# Patient Record
Sex: MALE | Race: WHITE | Employment: OTHER | URBAN - METROPOLITAN AREA
[De-identification: names, ages, dates, MRNs, and addresses within clinical notes are randomized per-mention and may not be internally consistent; named-entity substitution may affect disease eponyms.]

---

## 2017-01-11 RX ORDER — TESTOSTERONE 16.2 MG/G
GEL TRANSDERMAL
Qty: 75 G | Refills: 3
Start: 2017-01-11 | End: 2017-02-22 | Stop reason: CLARIF

## 2017-01-11 RX ORDER — ALLOPURINOL 300 MG/1
300 TABLET ORAL DAILY
Qty: 90 TABLET | Refills: 3 | Status: SHIPPED | OUTPATIENT
Start: 2017-01-11 | End: 2018-01-29 | Stop reason: SDUPTHER

## 2017-01-11 RX ORDER — AMLODIPINE BESYLATE 10 MG/1
10 TABLET ORAL DAILY
Qty: 90 TABLET | Refills: 3 | Status: SHIPPED | OUTPATIENT
Start: 2017-01-11 | End: 2018-01-29 | Stop reason: SDUPTHER

## 2017-01-30 ENCOUNTER — TELEPHONE (OUTPATIENT)
Dept: INTERNAL MEDICINE CLINIC | Age: 67
End: 2017-01-30

## 2017-02-09 ENCOUNTER — HOSPITAL ENCOUNTER (OUTPATIENT)
Dept: OTHER | Age: 67
Discharge: HOME | End: 2017-02-09
Attending: INTERNAL MEDICINE | Admitting: INTERNAL MEDICINE

## 2017-02-09 LAB
A/G RATIO: 1.4 (ref 1.1–2.2)
ALBUMIN SERPL-MCNC: 4.2 G/DL (ref 3.4–5)
ALP BLD-CCNC: 98 U/L (ref 40–129)
ALT SERPL-CCNC: 37 U/L (ref 10–40)
ANION GAP SERPL CALCULATED.3IONS-SCNC: 13 MMOL/L (ref 3–16)
AST SERPL-CCNC: 30 U/L (ref 15–37)
BILIRUB SERPL-MCNC: 0.4 MG/DL (ref 0–1)
BUN BLDV-MCNC: 17 MG/DL (ref 7–20)
CALCIUM SERPL-MCNC: 9.7 MG/DL (ref 8.3–10.6)
CHLORIDE BLD-SCNC: 100 MMOL/L (ref 99–110)
CHOLESTEROL, TOTAL: 155 MG/DL (ref 0–199)
CO2: 26 MMOL/L (ref 21–32)
CREAT SERPL-MCNC: 1 MG/DL (ref 0.8–1.3)
CREATININE URINE: 151.1 MG/DL (ref 39–259)
GFR AFRICAN AMERICAN: >60
GFR NON-AFRICAN AMERICAN: >60
GLOBULIN: 3 G/DL
GLUCOSE BLD-MCNC: 180 MG/DL (ref 70–99)
HDLC SERPL-MCNC: 28 MG/DL (ref 40–60)
LDL CHOLESTEROL CALCULATED: 73 MG/DL
MICROALBUMIN UR-MCNC: 1.5 MG/DL
MICROALBUMIN/CREAT UR-RTO: 9.9 MG/G (ref 0–30)
POTASSIUM SERPL-SCNC: 4.7 MMOL/L (ref 3.5–5.1)
SODIUM BLD-SCNC: 139 MMOL/L (ref 136–145)
TOTAL PROTEIN: 7.2 G/DL (ref 6.4–8.2)
TRIGL SERPL-MCNC: 269 MG/DL (ref 0–150)
TSH SERPL DL<=0.05 MIU/L-ACNC: 1.79 UIU/ML (ref 0.27–4.2)
VLDLC SERPL CALC-MCNC: 54 MG/DL

## 2017-02-10 LAB
ESTIMATED AVERAGE GLUCOSE: 234.6 MG/DL
HBA1C MFR BLD: 9.8 %

## 2017-02-20 ENCOUNTER — HOSPITAL ENCOUNTER (OUTPATIENT)
Dept: VASCULAR LAB | Age: 67
Discharge: HOME | End: 2017-02-20
Attending: INTERNAL MEDICINE | Admitting: INTERNAL MEDICINE

## 2017-02-20 DIAGNOSIS — E11.65 TYPE 2 DIABETES MELLITUS WITH HYPERGLYCEMIA (HCC): ICD-10-CM

## 2017-02-22 ENCOUNTER — OFFICE VISIT (OUTPATIENT)
Dept: INTERNAL MEDICINE CLINIC | Age: 67
End: 2017-02-22

## 2017-02-22 VITALS
WEIGHT: 280 LBS | BODY MASS INDEX: 39.2 KG/M2 | SYSTOLIC BLOOD PRESSURE: 128 MMHG | HEIGHT: 71 IN | HEART RATE: 72 BPM | DIASTOLIC BLOOD PRESSURE: 68 MMHG

## 2017-02-22 DIAGNOSIS — Z11.59 NEED FOR HEPATITIS C SCREENING TEST: ICD-10-CM

## 2017-02-22 DIAGNOSIS — I10 ESSENTIAL HYPERTENSION: ICD-10-CM

## 2017-02-22 DIAGNOSIS — E29.1 PRIMARY MALE HYPOGONADISM: ICD-10-CM

## 2017-02-22 DIAGNOSIS — E11.42 TYPE 2 DIABETES MELLITUS WITH DIABETIC POLYNEUROPATHY, WITH LONG-TERM CURRENT USE OF INSULIN (HCC): Primary | ICD-10-CM

## 2017-02-22 DIAGNOSIS — N42.9 PROSTATE DISEASE: ICD-10-CM

## 2017-02-22 DIAGNOSIS — Z79.4 TYPE 2 DIABETES MELLITUS WITH DIABETIC POLYNEUROPATHY, WITH LONG-TERM CURRENT USE OF INSULIN (HCC): Primary | ICD-10-CM

## 2017-02-22 DIAGNOSIS — G47.33 OSA (OBSTRUCTIVE SLEEP APNEA): ICD-10-CM

## 2017-02-22 DIAGNOSIS — Z12.5 SCREENING PSA (PROSTATE SPECIFIC ANTIGEN): ICD-10-CM

## 2017-02-22 DIAGNOSIS — E03.9 ACQUIRED HYPOTHYROIDISM: ICD-10-CM

## 2017-02-22 PROCEDURE — 99214 OFFICE O/P EST MOD 30 MIN: CPT | Performed by: INTERNAL MEDICINE

## 2017-02-22 RX ORDER — TESTOSTERONE GEL, 1% 10 MG/G
GEL TRANSDERMAL
Qty: 150 G | Refills: 5 | Status: SHIPPED | OUTPATIENT
Start: 2017-02-22 | End: 2017-08-23 | Stop reason: ALTCHOICE

## 2017-02-22 ASSESSMENT — ENCOUNTER SYMPTOMS
SHORTNESS OF BREATH: 0
COUGH: 0
COLOR CHANGE: 0
WHEEZING: 0
VOMITING: 0
ABDOMINAL PAIN: 0
NAUSEA: 0

## 2017-02-22 ASSESSMENT — PATIENT HEALTH QUESTIONNAIRE - PHQ9
1. LITTLE INTEREST OR PLEASURE IN DOING THINGS: 0
SUM OF ALL RESPONSES TO PHQ9 QUESTIONS 1 & 2: 0
2. FEELING DOWN, DEPRESSED OR HOPELESS: 0
SUM OF ALL RESPONSES TO PHQ QUESTIONS 1-9: 0

## 2017-02-23 ENCOUNTER — HOSPITAL ENCOUNTER (OUTPATIENT)
Dept: OTHER | Age: 67
Discharge: HOME | End: 2017-02-23
Attending: INTERNAL MEDICINE | Admitting: INTERNAL MEDICINE

## 2017-02-23 ENCOUNTER — HOSPITAL ENCOUNTER (OUTPATIENT)
Dept: VASCULAR LAB | Age: 67
Discharge: HOME | End: 2017-02-23
Admitting: INTERNAL MEDICINE

## 2017-02-23 DIAGNOSIS — Z11.59 NEED FOR HEPATITIS C SCREENING TEST: ICD-10-CM

## 2017-02-23 DIAGNOSIS — E29.1 PRIMARY MALE HYPOGONADISM: ICD-10-CM

## 2017-02-23 DIAGNOSIS — E11.65 TYPE 2 DIABETES MELLITUS WITH HYPERGLYCEMIA (HCC): ICD-10-CM

## 2017-02-23 DIAGNOSIS — N42.9 PROSTATE DISEASE: ICD-10-CM

## 2017-02-23 DIAGNOSIS — I73.9 PVD (PERIPHERAL VASCULAR DISEASE) (HCC): Primary | ICD-10-CM

## 2017-02-23 LAB
HCT VFR BLD CALC: 40.3 % (ref 40.5–52.5)
HEMOGLOBIN: 13.3 G/DL (ref 13.5–17.5)
HEPATITIS C ANTIBODY INTERPRETATION: NORMAL
MCH RBC QN AUTO: 28.9 PG (ref 26–34)
MCHC RBC AUTO-ENTMCNC: 32.9 G/DL (ref 31–36)
MCV RBC AUTO: 87.8 FL (ref 80–100)
PDW BLD-RTO: 14.4 % (ref 12.4–15.4)
PLATELET # BLD: 268 K/UL (ref 135–450)
PMV BLD AUTO: 10.1 FL (ref 5–10.5)
PROSTATE SPECIFIC ANTIGEN: 1.71 NG/ML (ref 0–4)
RBC # BLD: 4.59 M/UL (ref 4.2–5.9)
WBC # BLD: 9 K/UL (ref 4–11)

## 2017-02-25 LAB
SEX HORMONE BINDING GLOBULIN: 23 NMOL/L (ref 11–80)
TESTOSTERONE FREE PERCENT: 2.1 % (ref 1.6–2.9)
TESTOSTERONE FREE, CALC: 36 PG/ML (ref 47–244)
TESTOSTERONE TOTAL-MALE: 176 NG/DL (ref 300–720)

## 2017-03-21 RX ORDER — POTASSIUM CITRATE 10 MEQ/1
TABLET, EXTENDED RELEASE ORAL
Qty: 30 TABLET | Refills: 0 | Status: SHIPPED | OUTPATIENT
Start: 2017-03-21 | End: 2017-04-24 | Stop reason: SDUPTHER

## 2017-04-24 RX ORDER — POTASSIUM CITRATE 10 MEQ/1
TABLET, EXTENDED RELEASE ORAL
Qty: 30 TABLET | Refills: 0 | Status: SHIPPED | OUTPATIENT
Start: 2017-04-24 | End: 2017-05-22 | Stop reason: SDUPTHER

## 2017-05-22 RX ORDER — POTASSIUM CITRATE 10 MEQ/1
TABLET, EXTENDED RELEASE ORAL
Qty: 30 TABLET | Refills: 0 | Status: SHIPPED | OUTPATIENT
Start: 2017-05-22 | End: 2017-07-21 | Stop reason: SDUPTHER

## 2017-05-23 ENCOUNTER — TELEPHONE (OUTPATIENT)
Dept: SLEEP MEDICINE | Age: 67
End: 2017-05-23

## 2017-05-23 ENCOUNTER — INITIAL CONSULT (OUTPATIENT)
Dept: SLEEP MEDICINE | Age: 67
End: 2017-05-23

## 2017-05-23 VITALS
SYSTOLIC BLOOD PRESSURE: 120 MMHG | HEART RATE: 63 BPM | DIASTOLIC BLOOD PRESSURE: 62 MMHG | WEIGHT: 278 LBS | OXYGEN SATURATION: 97 % | HEIGHT: 72 IN | BODY MASS INDEX: 37.65 KG/M2

## 2017-05-23 DIAGNOSIS — I10 ESSENTIAL HYPERTENSION: Chronic | ICD-10-CM

## 2017-05-23 DIAGNOSIS — E11.42 TYPE 2 DIABETES MELLITUS WITH DIABETIC POLYNEUROPATHY, WITH LONG-TERM CURRENT USE OF INSULIN (HCC): Chronic | ICD-10-CM

## 2017-05-23 DIAGNOSIS — E66.9 NON MORBID OBESITY, UNSPECIFIED OBESITY TYPE: Chronic | ICD-10-CM

## 2017-05-23 DIAGNOSIS — G47.33 OBSTRUCTIVE SLEEP APNEA SYNDROME: Primary | ICD-10-CM

## 2017-05-23 DIAGNOSIS — Z79.4 TYPE 2 DIABETES MELLITUS WITH DIABETIC POLYNEUROPATHY, WITH LONG-TERM CURRENT USE OF INSULIN (HCC): Chronic | ICD-10-CM

## 2017-05-23 PROCEDURE — 99204 OFFICE O/P NEW MOD 45 MIN: CPT | Performed by: INTERNAL MEDICINE

## 2017-05-23 ASSESSMENT — ENCOUNTER SYMPTOMS
PHOTOPHOBIA: 0
RHINORRHEA: 0
EYE PAIN: 0
SHORTNESS OF BREATH: 0
VOMITING: 0
ALLERGIC/IMMUNOLOGIC NEGATIVE: 1
APNEA: 0
CHEST TIGHTNESS: 0
NAUSEA: 0
ABDOMINAL DISTENTION: 0
ABDOMINAL PAIN: 0
CHOKING: 0

## 2017-05-23 ASSESSMENT — SLEEP AND FATIGUE QUESTIONNAIRES
HOW LIKELY ARE YOU TO NOD OFF OR FALL ASLEEP WHILE WATCHING TV: 2
HOW LIKELY ARE YOU TO NOD OFF OR FALL ASLEEP IN A CAR, WHILE STOPPED FOR A FEW MINUTES IN TRAFFIC: 1
HOW LIKELY ARE YOU TO NOD OFF OR FALL ASLEEP WHILE LYING DOWN TO REST IN THE AFTERNOON WHEN CIRCUMSTANCES PERMIT: 2
HOW LIKELY ARE YOU TO NOD OFF OR FALL ASLEEP WHILE SITTING INACTIVE IN A PUBLIC PLACE: 1
HOW LIKELY ARE YOU TO NOD OFF OR FALL ASLEEP WHILE SITTING AND READING: 2
HOW LIKELY ARE YOU TO NOD OFF OR FALL ASLEEP WHILE SITTING AND TALKING TO SOMEONE: 1
NECK CIRCUMFERENCE (INCHES): 20
HOW LIKELY ARE YOU TO NOD OFF OR FALL ASLEEP WHEN YOU ARE A PASSENGER IN A CAR FOR AN HOUR WITHOUT A BREAK: 2
ESS TOTAL SCORE: 12
HOW LIKELY ARE YOU TO NOD OFF OR FALL ASLEEP WHILE SITTING QUIETLY AFTER LUNCH WITHOUT ALCOHOL: 1

## 2017-06-06 ENCOUNTER — HOSPITAL ENCOUNTER (OUTPATIENT)
Dept: SLEEP MEDICINE | Age: 67
Discharge: HOME | End: 2017-06-30
Admitting: INTERNAL MEDICINE

## 2017-06-06 DIAGNOSIS — G47.33 OBSTRUCTIVE SLEEP APNEA SYNDROME: ICD-10-CM

## 2017-06-07 PROCEDURE — 95806 SLEEP STUDY UNATT&RESP EFFT: CPT | Performed by: INTERNAL MEDICINE

## 2017-06-12 ENCOUNTER — TELEPHONE (OUTPATIENT)
Dept: PULMONOLOGY | Age: 67
End: 2017-06-12

## 2017-06-20 ENCOUNTER — TELEPHONE (OUTPATIENT)
Dept: SLEEP MEDICINE | Age: 67
End: 2017-06-20

## 2017-06-26 RX ORDER — ATORVASTATIN CALCIUM 10 MG/1
TABLET, FILM COATED ORAL
Qty: 90 TABLET | Refills: 1 | Status: SHIPPED | OUTPATIENT
Start: 2017-06-26 | End: 2018-01-29 | Stop reason: SDUPTHER

## 2017-07-21 RX ORDER — POTASSIUM CITRATE 10 MEQ/1
TABLET, EXTENDED RELEASE ORAL
Qty: 30 TABLET | Refills: 1 | Status: SHIPPED | OUTPATIENT
Start: 2017-07-21 | End: 2017-09-25 | Stop reason: SDUPTHER

## 2017-08-08 ENCOUNTER — OFFICE VISIT (OUTPATIENT)
Dept: SLEEP MEDICINE | Age: 67
End: 2017-08-08

## 2017-08-08 VITALS
WEIGHT: 278 LBS | HEART RATE: 79 BPM | BODY MASS INDEX: 37.65 KG/M2 | HEIGHT: 72 IN | OXYGEN SATURATION: 98 % | DIASTOLIC BLOOD PRESSURE: 62 MMHG | SYSTOLIC BLOOD PRESSURE: 132 MMHG

## 2017-08-08 DIAGNOSIS — Z79.4 TYPE 2 DIABETES MELLITUS WITH DIABETIC POLYNEUROPATHY, WITH LONG-TERM CURRENT USE OF INSULIN (HCC): Chronic | ICD-10-CM

## 2017-08-08 DIAGNOSIS — E66.9 NON MORBID OBESITY, UNSPECIFIED OBESITY TYPE: Chronic | ICD-10-CM

## 2017-08-08 DIAGNOSIS — I10 ESSENTIAL HYPERTENSION: Chronic | ICD-10-CM

## 2017-08-08 DIAGNOSIS — G47.33 OBSTRUCTIVE SLEEP APNEA SYNDROME: Primary | ICD-10-CM

## 2017-08-08 DIAGNOSIS — E11.42 TYPE 2 DIABETES MELLITUS WITH DIABETIC POLYNEUROPATHY, WITH LONG-TERM CURRENT USE OF INSULIN (HCC): Chronic | ICD-10-CM

## 2017-08-08 PROCEDURE — 99214 OFFICE O/P EST MOD 30 MIN: CPT | Performed by: NURSE PRACTITIONER

## 2017-08-08 ASSESSMENT — ENCOUNTER SYMPTOMS
COUGH: 0
ABDOMINAL DISTENTION: 0
RHINORRHEA: 0
SHORTNESS OF BREATH: 0
SINUS PRESSURE: 0
APNEA: 0
ABDOMINAL PAIN: 0

## 2017-08-08 ASSESSMENT — SLEEP AND FATIGUE QUESTIONNAIRES
HOW LIKELY ARE YOU TO NOD OFF OR FALL ASLEEP WHILE SITTING AND TALKING TO SOMEONE: 0
HOW LIKELY ARE YOU TO NOD OFF OR FALL ASLEEP WHEN YOU ARE A PASSENGER IN A CAR FOR AN HOUR WITHOUT A BREAK: 1
ESS TOTAL SCORE: 8
HOW LIKELY ARE YOU TO NOD OFF OR FALL ASLEEP WHILE SITTING QUIETLY AFTER LUNCH WITHOUT ALCOHOL: 1
HOW LIKELY ARE YOU TO NOD OFF OR FALL ASLEEP WHILE WATCHING TV: 2
HOW LIKELY ARE YOU TO NOD OFF OR FALL ASLEEP IN A CAR, WHILE STOPPED FOR A FEW MINUTES IN TRAFFIC: 0
HOW LIKELY ARE YOU TO NOD OFF OR FALL ASLEEP WHILE SITTING INACTIVE IN A PUBLIC PLACE: 1
HOW LIKELY ARE YOU TO NOD OFF OR FALL ASLEEP WHILE LYING DOWN TO REST IN THE AFTERNOON WHEN CIRCUMSTANCES PERMIT: 2
HOW LIKELY ARE YOU TO NOD OFF OR FALL ASLEEP WHILE SITTING AND READING: 1

## 2017-08-23 ENCOUNTER — OFFICE VISIT (OUTPATIENT)
Dept: INTERNAL MEDICINE CLINIC | Age: 67
End: 2017-08-23

## 2017-08-23 VITALS
SYSTOLIC BLOOD PRESSURE: 134 MMHG | BODY MASS INDEX: 36.7 KG/M2 | WEIGHT: 271 LBS | HEIGHT: 72 IN | DIASTOLIC BLOOD PRESSURE: 70 MMHG

## 2017-08-23 DIAGNOSIS — G47.33 OBSTRUCTIVE SLEEP APNEA SYNDROME: Chronic | ICD-10-CM

## 2017-08-23 DIAGNOSIS — E03.9 ACQUIRED HYPOTHYROIDISM: Chronic | ICD-10-CM

## 2017-08-23 DIAGNOSIS — I10 ESSENTIAL HYPERTENSION: Chronic | ICD-10-CM

## 2017-08-23 DIAGNOSIS — Z79.4 TYPE 2 DIABETES MELLITUS WITH DIABETIC POLYNEUROPATHY, WITH LONG-TERM CURRENT USE OF INSULIN (HCC): Chronic | ICD-10-CM

## 2017-08-23 DIAGNOSIS — E11.42 TYPE 2 DIABETES MELLITUS WITH DIABETIC POLYNEUROPATHY, WITH LONG-TERM CURRENT USE OF INSULIN (HCC): Chronic | ICD-10-CM

## 2017-08-23 DIAGNOSIS — Z79.4 TYPE 2 DIABETES MELLITUS WITH DIABETIC POLYNEUROPATHY, WITH LONG-TERM CURRENT USE OF INSULIN (HCC): Primary | Chronic | ICD-10-CM

## 2017-08-23 DIAGNOSIS — E11.42 TYPE 2 DIABETES MELLITUS WITH DIABETIC POLYNEUROPATHY, WITH LONG-TERM CURRENT USE OF INSULIN (HCC): Primary | Chronic | ICD-10-CM

## 2017-08-23 PROCEDURE — 99214 OFFICE O/P EST MOD 30 MIN: CPT | Performed by: INTERNAL MEDICINE

## 2017-08-23 RX ORDER — ERGOCALCIFEROL 1.25 MG/1
CAPSULE ORAL
Qty: 6 CAPSULE | Refills: 3 | Status: SHIPPED | OUTPATIENT
Start: 2017-08-23 | End: 2018-07-09 | Stop reason: SDUPTHER

## 2017-08-23 ASSESSMENT — ENCOUNTER SYMPTOMS
SHORTNESS OF BREATH: 0
COUGH: 0
WHEEZING: 0
NAUSEA: 0
ABDOMINAL PAIN: 0

## 2017-08-24 LAB
ESTIMATED AVERAGE GLUCOSE: 228.8 MG/DL
HBA1C MFR BLD: 9.6 %

## 2017-09-25 RX ORDER — POTASSIUM CITRATE 10 MEQ/1
TABLET, EXTENDED RELEASE ORAL
Qty: 30 TABLET | Refills: 1 | Status: SHIPPED | OUTPATIENT
Start: 2017-09-25 | End: 2017-11-11 | Stop reason: SDUPTHER

## 2017-09-26 RX ORDER — LEVOTHYROXINE SODIUM 0.1 MG/1
TABLET ORAL
Qty: 90 TABLET | Refills: 5 | Status: SHIPPED | OUTPATIENT
Start: 2017-09-26 | End: 2018-09-24 | Stop reason: SDUPTHER

## 2017-10-19 DIAGNOSIS — Z23 NEED FOR INFLUENZA VACCINATION: Primary | ICD-10-CM

## 2017-10-19 PROCEDURE — 90662 IIV NO PRSV INCREASED AG IM: CPT | Performed by: INTERNAL MEDICINE

## 2017-10-19 PROCEDURE — G0008 ADMIN INFLUENZA VIRUS VAC: HCPCS | Performed by: INTERNAL MEDICINE

## 2017-11-11 DIAGNOSIS — E11.42 TYPE 2 DIABETES MELLITUS WITH DIABETIC POLYNEUROPATHY, WITH LONG-TERM CURRENT USE OF INSULIN (HCC): Chronic | ICD-10-CM

## 2017-11-11 DIAGNOSIS — Z79.4 TYPE 2 DIABETES MELLITUS WITH DIABETIC POLYNEUROPATHY, WITH LONG-TERM CURRENT USE OF INSULIN (HCC): Chronic | ICD-10-CM

## 2017-11-13 RX ORDER — POTASSIUM CITRATE 10 MEQ/1
TABLET, EXTENDED RELEASE ORAL
Qty: 30 TABLET | Refills: 2 | Status: SHIPPED | OUTPATIENT
Start: 2017-11-13 | End: 2018-01-29 | Stop reason: SDUPTHER

## 2017-11-13 RX ORDER — DULAGLUTIDE 0.75 MG/.5ML
INJECTION, SOLUTION SUBCUTANEOUS
Qty: 2 ML | Refills: 2 | Status: SHIPPED | OUTPATIENT
Start: 2017-11-13 | End: 2017-11-27 | Stop reason: DRUGHIGH

## 2017-11-14 RX ORDER — PEN NEEDLE, DIABETIC 31 GX5/16"
NEEDLE, DISPOSABLE MISCELLANEOUS
Qty: 200 EACH | Refills: 5 | Status: SHIPPED | OUTPATIENT
Start: 2017-11-14 | End: 2018-10-22 | Stop reason: SDUPTHER

## 2017-11-27 ENCOUNTER — OFFICE VISIT (OUTPATIENT)
Dept: INTERNAL MEDICINE CLINIC | Age: 67
End: 2017-11-27

## 2017-11-27 VITALS
BODY MASS INDEX: 36.7 KG/M2 | SYSTOLIC BLOOD PRESSURE: 128 MMHG | HEIGHT: 72 IN | HEART RATE: 72 BPM | WEIGHT: 271 LBS | DIASTOLIC BLOOD PRESSURE: 78 MMHG

## 2017-11-27 DIAGNOSIS — E29.1 PRIMARY MALE HYPOGONADISM: Chronic | ICD-10-CM

## 2017-11-27 DIAGNOSIS — E03.9 ACQUIRED HYPOTHYROIDISM: Chronic | ICD-10-CM

## 2017-11-27 DIAGNOSIS — E11.42 TYPE 2 DIABETES MELLITUS WITH DIABETIC POLYNEUROPATHY, WITH LONG-TERM CURRENT USE OF INSULIN (HCC): Primary | Chronic | ICD-10-CM

## 2017-11-27 DIAGNOSIS — Z79.4 TYPE 2 DIABETES MELLITUS WITH DIABETIC POLYNEUROPATHY, WITH LONG-TERM CURRENT USE OF INSULIN (HCC): Primary | Chronic | ICD-10-CM

## 2017-11-27 DIAGNOSIS — I10 ESSENTIAL HYPERTENSION: Chronic | ICD-10-CM

## 2017-11-27 PROCEDURE — 99214 OFFICE O/P EST MOD 30 MIN: CPT | Performed by: INTERNAL MEDICINE

## 2017-11-27 ASSESSMENT — ENCOUNTER SYMPTOMS
ABDOMINAL PAIN: 0
WHEEZING: 0
DIARRHEA: 0
VOMITING: 0
NAUSEA: 0
SHORTNESS OF BREATH: 0
CHEST TIGHTNESS: 0

## 2017-11-27 NOTE — PROGRESS NOTES
No    Drug use: No    Sexual activity: Not Asked     Other Topics Concern    None     Social History Narrative    None     Family History   Problem Relation Age of Onset    Cancer Father      skin    Coronary Art Dis Father     Diabetes Father     Coronary Art Dis Paternal Uncle     Diabetes Paternal Uncle     Stroke Paternal Uncle     Heart Disease Neg Hx     High Blood Pressure Neg Hx     Osteoporosis Neg Hx     Thyroid Disease Neg Hx        Review of Systems   Constitutional: Negative for diaphoresis and fatigue. Respiratory: Negative for chest tightness, shortness of breath and wheezing. Cardiovascular: Negative for chest pain and leg swelling. Gastrointestinal: Negative for abdominal pain, diarrhea, nausea and vomiting. Endocrine: Negative for polydipsia, polyphagia and polyuria. Neurological: Negative for dizziness and light-headedness. OBJECTIVE:  Pulse Readings from Last 4 Encounters:   11/27/17 72   08/08/17 79   05/23/17 63   02/22/17 72      Wt Readings from Last 4 Encounters:   11/27/17 271 lb (122.9 kg)   08/23/17 271 lb (122.9 kg)   08/08/17 278 lb (126.1 kg)   05/23/17 278 lb (126.1 kg)     BP Readings from Last 4 Encounters:   11/27/17 128/78   08/23/17 134/70   08/08/17 132/62   05/23/17 120/62     Physical Exam   Constitutional: He appears well-developed and well-nourished. Neck: Normal range of motion. Neck supple. Cardiovascular: Normal rate and normal heart sounds. Pulmonary/Chest: Effort normal and breath sounds normal. No respiratory distress. He has no wheezes. Musculoskeletal: He exhibits no edema. Skin: No rash noted. No erythema. Sensory exam of the foot showed decreased sensation tested with the monofilament. Good pulses, slight callus and thickened great toe nail. Sees podiatrist.  DRY SKIN   Nursing note and vitals reviewed.       CBC:   Lab Results   Component Value Date    WBC 9.0 02/23/2017    HGB 13.3 02/23/2017    HCT 40.3 02/23/2017

## 2018-02-01 ENCOUNTER — OFFICE VISIT (OUTPATIENT)
Dept: ENDOCRINOLOGY | Age: 68
End: 2018-02-01

## 2018-02-01 VITALS
BODY MASS INDEX: 36.14 KG/M2 | TEMPERATURE: 98.1 F | HEIGHT: 72 IN | WEIGHT: 266.8 LBS | RESPIRATION RATE: 12 BRPM | OXYGEN SATURATION: 96 % | SYSTOLIC BLOOD PRESSURE: 122 MMHG | DIASTOLIC BLOOD PRESSURE: 71 MMHG | HEART RATE: 68 BPM

## 2018-02-01 DIAGNOSIS — E55.9 VITAMIN D DEFICIENCY: ICD-10-CM

## 2018-02-01 DIAGNOSIS — E11.42 TYPE 2 DIABETES MELLITUS WITH DIABETIC POLYNEUROPATHY, WITH LONG-TERM CURRENT USE OF INSULIN (HCC): Chronic | ICD-10-CM

## 2018-02-01 DIAGNOSIS — I10 ESSENTIAL HYPERTENSION: Chronic | ICD-10-CM

## 2018-02-01 DIAGNOSIS — E78.2 MIXED HYPERLIPIDEMIA: ICD-10-CM

## 2018-02-01 DIAGNOSIS — E87.6 HYPOKALEMIA: ICD-10-CM

## 2018-02-01 DIAGNOSIS — Z79.4 TYPE 2 DIABETES MELLITUS WITH DIABETIC POLYNEUROPATHY, WITH LONG-TERM CURRENT USE OF INSULIN (HCC): Chronic | ICD-10-CM

## 2018-02-01 DIAGNOSIS — E03.9 ACQUIRED HYPOTHYROIDISM: ICD-10-CM

## 2018-02-01 PROCEDURE — 99214 OFFICE O/P EST MOD 30 MIN: CPT | Performed by: INTERNAL MEDICINE

## 2018-02-01 RX ORDER — VIT C/B6/B5/MAGNESIUM/HERB 173 50-5-6-5MG
1000 CAPSULE ORAL DAILY
COMMUNITY

## 2018-02-01 NOTE — PROGRESS NOTES
Mother Alive    Paternal Uncle     Neg Hx        Review of Systems  Constitutional: has fatigue, no fever, no recent weight gain, no recent weight loss, no changes in appetite  Eyes: no eye pain, no change in vision, no eye redness, no eye irritation, no double vision  Ears, nose, throat: no nasal congestion, no sore throat, no earache, no decrease in hearing, no hoarseness, no dry mouth, no sinus problems, no difficulty swallowing, no neck lumps, no dental problems, no mouth sores, no ringing in ears  Pulmonary: no shortness of breath, no wheezing, no dyspnea on exertion, no cough  Cardiovascular: no chest pain, no lower extremity edema, no orthopnea, no intermittent leg claudication, no palpitations  Gastrointestinal: no abdominal pain, no nausea, no vomiting, no diarrhea, no constipation, no dysphagia, no heartburn, no bloating  Genitourinary: no dysuria, no urinary incontinence, no urinary hesitancy, no urinary frequency, no feelings of urinary urgency, no nocturia  Musculoskeletal: no joint swelling, no joint stiffness, no joint pain, no muscle cramps, no muscle pain, no bone pain, no fractures  Integument/Breast:  no hair loss, no skin rashes, no skin lesions, no itching, no dry skin  Neurological: Has numbness, has tingling, no weakness, no confusion, no headaches, no dizziness, no fainting, no tremors, no decrease in memory, no balance problems  Psychiatric: no anxiety, no depression, no insomnia  Hematologic/Lymphatic: no tendency for easy bleeding, no swollen lymph nodes, no tendency for easy bruising  Immunology: has seasonal allergies, no frequent infections, no frequent illnesses  Endocrine: no temperature intolerance     OBJECTIVE:  /71 (Site: Right Arm, Position: Sitting, Cuff Size: Large Adult)   Pulse 68   Temp 98.1 °F (36.7 °C) (Oral)   Resp 12   Ht 5' 11.85\" (1.825 m)   Wt 266 lb 12.8 oz (121 kg)   SpO2 96%   BMI 36.34 kg/m²      Constitutional: no acute distress, well appearing and

## 2018-02-05 ENCOUNTER — HOSPITAL ENCOUNTER (OUTPATIENT)
Dept: OTHER | Age: 68
Discharge: HOME | End: 2018-02-05
Attending: INTERNAL MEDICINE | Admitting: INTERNAL MEDICINE

## 2018-02-05 DIAGNOSIS — E03.9 ACQUIRED HYPOTHYROIDISM: ICD-10-CM

## 2018-02-05 DIAGNOSIS — E78.2 MIXED HYPERLIPIDEMIA: ICD-10-CM

## 2018-02-05 LAB
A/G RATIO: 1.4 (ref 1.1–2.2)
ALBUMIN SERPL-MCNC: 4.4 G/DL (ref 3.4–5)
ALP BLD-CCNC: 94 U/L (ref 40–129)
ALT SERPL-CCNC: 45 U/L (ref 10–40)
ANION GAP SERPL CALCULATED.3IONS-SCNC: 13 MMOL/L (ref 3–16)
AST SERPL-CCNC: 34 U/L (ref 15–37)
BILIRUB SERPL-MCNC: 0.3 MG/DL (ref 0–1)
BUN BLDV-MCNC: 18 MG/DL (ref 7–20)
CALCIUM SERPL-MCNC: 9.8 MG/DL (ref 8.3–10.6)
CHLORIDE BLD-SCNC: 102 MMOL/L (ref 99–110)
CHOLESTEROL, TOTAL: 143 MG/DL (ref 0–199)
CO2: 27 MMOL/L (ref 21–32)
CREAT SERPL-MCNC: 1.1 MG/DL (ref 0.8–1.3)
CREATININE URINE: 195.5 MG/DL (ref 39–259)
ESTIMATED AVERAGE GLUCOSE: 223.1 MG/DL
GFR AFRICAN AMERICAN: >60
GFR NON-AFRICAN AMERICAN: >60
GLOBULIN: 3.1 G/DL
GLUCOSE BLD-MCNC: 151 MG/DL (ref 70–99)
HBA1C MFR BLD: 9.4 %
HDLC SERPL-MCNC: 27 MG/DL (ref 40–60)
LDL CHOLESTEROL CALCULATED: ABNORMAL MG/DL
LDL CHOLESTEROL DIRECT: 64 MG/DL
MICROALBUMIN UR-MCNC: 3.6 MG/DL
MICROALBUMIN/CREAT UR-RTO: 18.4 MG/G (ref 0–30)
POTASSIUM SERPL-SCNC: 4.6 MMOL/L (ref 3.5–5.1)
SODIUM BLD-SCNC: 142 MMOL/L (ref 136–145)
T4 FREE: 1.2 NG/DL (ref 0.9–1.8)
TOTAL PROTEIN: 7.5 G/DL (ref 6.4–8.2)
TRIGL SERPL-MCNC: 353 MG/DL (ref 0–150)
TSH SERPL DL<=0.05 MIU/L-ACNC: 1.93 UIU/ML (ref 0.27–4.2)
VLDLC SERPL CALC-MCNC: ABNORMAL MG/DL

## 2018-02-06 ENCOUNTER — OFFICE VISIT (OUTPATIENT)
Dept: SLEEP MEDICINE | Age: 68
End: 2018-02-06

## 2018-02-06 VITALS
DIASTOLIC BLOOD PRESSURE: 62 MMHG | HEART RATE: 68 BPM | HEIGHT: 71 IN | BODY MASS INDEX: 37.24 KG/M2 | WEIGHT: 266 LBS | SYSTOLIC BLOOD PRESSURE: 118 MMHG | OXYGEN SATURATION: 96 %

## 2018-02-06 DIAGNOSIS — G47.33 OBSTRUCTIVE SLEEP APNEA SYNDROME: Primary | Chronic | ICD-10-CM

## 2018-02-06 DIAGNOSIS — E11.42 TYPE 2 DIABETES MELLITUS WITH DIABETIC POLYNEUROPATHY, WITH LONG-TERM CURRENT USE OF INSULIN (HCC): Chronic | ICD-10-CM

## 2018-02-06 DIAGNOSIS — I10 ESSENTIAL HYPERTENSION: Chronic | ICD-10-CM

## 2018-02-06 DIAGNOSIS — E66.9 NON MORBID OBESITY, UNSPECIFIED OBESITY TYPE: Chronic | ICD-10-CM

## 2018-02-06 DIAGNOSIS — Z79.4 TYPE 2 DIABETES MELLITUS WITH DIABETIC POLYNEUROPATHY, WITH LONG-TERM CURRENT USE OF INSULIN (HCC): Chronic | ICD-10-CM

## 2018-02-06 PROCEDURE — 99214 OFFICE O/P EST MOD 30 MIN: CPT | Performed by: NURSE PRACTITIONER

## 2018-02-06 ASSESSMENT — SLEEP AND FATIGUE QUESTIONNAIRES
ESS TOTAL SCORE: 10
HOW LIKELY ARE YOU TO NOD OFF OR FALL ASLEEP WHILE SITTING AND READING: 2
HOW LIKELY ARE YOU TO NOD OFF OR FALL ASLEEP WHEN YOU ARE A PASSENGER IN A CAR FOR AN HOUR WITHOUT A BREAK: 0
HOW LIKELY ARE YOU TO NOD OFF OR FALL ASLEEP WHILE SITTING AND TALKING TO SOMEONE: 1
HOW LIKELY ARE YOU TO NOD OFF OR FALL ASLEEP WHILE WATCHING TV: 2
HOW LIKELY ARE YOU TO NOD OFF OR FALL ASLEEP WHILE SITTING INACTIVE IN A PUBLIC PLACE: 1
HOW LIKELY ARE YOU TO NOD OFF OR FALL ASLEEP WHILE SITTING QUIETLY AFTER LUNCH WITHOUT ALCOHOL: 2
HOW LIKELY ARE YOU TO NOD OFF OR FALL ASLEEP IN A CAR, WHILE STOPPED FOR A FEW MINUTES IN TRAFFIC: 0
HOW LIKELY ARE YOU TO NOD OFF OR FALL ASLEEP WHILE LYING DOWN TO REST IN THE AFTERNOON WHEN CIRCUMSTANCES PERMIT: 2

## 2018-02-06 ASSESSMENT — ENCOUNTER SYMPTOMS
ABDOMINAL PAIN: 0
ABDOMINAL DISTENTION: 0
RHINORRHEA: 0
SHORTNESS OF BREATH: 0
COUGH: 0
SINUS PRESSURE: 0
APNEA: 0

## 2018-02-06 NOTE — LETTER
[facility]  [address] [place] [address]  Phone: [phone]  Fax: [phone]    February 6, 2018       Patient: Zandra Yang   MR Number: [id number]   YOB: 1950   Date of Visit: 2/6/2018       Saravanan Almeida was seen for a follow up visit today. Here is my assessment and plan as well as an attached copy of his visit today:      ASSESSMENT:  Italia Sarkar was seen today for sleep apnea. Diagnoses and all orders for this visit:    Obstructive sleep apnea syndrome    Essential hypertension    Type 2 diabetes mellitus with diabetic polyneuropathy, with long-term current use of insulin (HCC)    Non morbid obesity, unspecified obesity type        Plan:       If you have questions or concerns, please do not hesitate to call me. I look forward to following Italia Sarkar along with you.     Sincerely,      Kristy Soto, CNP    CC providers:  Mihai Holguin MD  [facility]  VIA In Basket

## 2018-02-06 NOTE — PROGRESS NOTES
Barbara Wolff MD, FAASM, [facility]  Leatha Perea, MSN, RN, CNP [place]  [address] [facility]  3rd Floor, [address]  [place], [address] [facility]  [address] [facility] [phone]   [address] [place] [address] Dr Polo . Jona Perez [address] [phone]       [facility]    Subjective:     Patient ID: Dylan Demarco is a 75 y.o. male. Chief Complaint   Patient presents with    Sleep Apnea     CFU       HPI:      Machine Present today:  Yes    Machine Modem/Download Info:  Compliance (hours/night): 5 hrs/night  Download AHI (/hour): 2.1 /HR  Average CPAP Pressure : 9.3 cmH2O           APAP - Settings  Pressure Min: 7 cmH2O  Pressure Max: 17 cmH2O                 Comfort Settings  Humidity Level (0-8): 3  Heated Tubing (Yes/No): Yes  Flex/EPR (0-3): 3 PAP Mask  Mask Type: Nasal mask  Mask Model: Flexi Fit     [place] - Total score: 10    Follow-up :     Last Visit : August 2017      Patient reports the listed Co-morbidities are well controlled and stable at this time. Subjective Health Changes: Over night oximetry was WNL     Follow-up :      Patient is compliant with the machine  Yes (and using travel unit also)   Feeling rested when using the machine   Yes     Pressure is comfortable with inspiration and expiration  Yes     Noticed changes in pressure   na   Mask is fitting well  Yes   Noting Mask Air Leak  No   Having painful Aerophagia  No   Nocturia   0-1 per night. Having  HA upon waking  No   Dry mouth upon waking   No   Congestion upon waking   No   Nose Bleeds  No   Using Sleep Aides  no   Understands how to change humidification and/or tubing temperature for comfort while at home  Yes .      Difficulties falling asleep  No   Difficulties staying asleep  No   Approximate time to bed  1am   Approximate wake time  6-6:30am   Taking Naps  occasional   If taking naps usual length  1-3 hours    If taking naps using the machine  No   Drowsy when driving  No   Does Obstructive sleep apnea syndrome       Past Medical History:   Diagnosis Date    Hearing loss     Hyperthyroidism     Hypogonadism in male     Kidney stones     MAO (obstructive sleep apnea)     Type II or unspecified type diabetes mellitus without mention of complication, not stated as uncontrolled        Past Surgical History:   Procedure Laterality Date    CHOLECYSTECTOMY      9/11    COLONOSCOPY      2009    THYROIDECTOMY      lt lobectomy 2/2010    TONSILLECTOMY         Family History   Problem Relation Age of Onset    Cancer Father      skin    Coronary Art Dis Father     Diabetes Father     Coronary Art Dis Paternal Uncle     Diabetes Paternal Uncle     Stroke Paternal Uncle     Heart Disease Neg Hx     High Blood Pressure Neg Hx     Osteoporosis Neg Hx     Thyroid Disease Neg Hx        Vitals:  Weight BMI   Wt Readings from Last 3 Encounters:   02/06/18 266 lb (120.7 kg)   02/01/18 266 lb 12.8 oz (121 kg)   11/27/17 271 lb (122.9 kg)    Body mass index is 37.1 kg/m². BP HR SaO2   BP Readings from Last 3 Encounters:   02/06/18 118/62   02/01/18 122/71   11/27/17 128/78    Pulse Readings from Last 3 Encounters:   02/06/18 68   02/01/18 68   11/27/17 72    SpO2 Readings from Last 3 Encounters:   02/06/18 96%   02/01/18 96%   08/08/17 98%        Assessment:     1. Obstructive sleep apnea syndrome Stable    2. Essential hypertension Stable    3. Type 2 diabetes mellitus with diabetic polyneuropathy, with long-term current use of insulin (HCC) Stable    4. Non morbid obesity, unspecified obesity type Stable        The chronic medical conditions listed are directly related to the primary diagnosis listed above. The management of the primary diagnosis affects the secondary diagnosis and vice versa.   Plan:   - Educated patient and reviewed compliance download with pt.    -Supplies and parts as needed for his machine, these are medically necessary.    - Patient using Other [employer] for

## 2018-02-07 LAB — C-PEPTIDE: 2.4 NG/ML (ref 1.1–4.4)

## 2018-02-09 ENCOUNTER — TELEPHONE (OUTPATIENT)
Dept: ENDOCRINOLOGY | Age: 68
End: 2018-02-09

## 2018-02-15 ENCOUNTER — TELEPHONE (OUTPATIENT)
Dept: INTERNAL MEDICINE CLINIC | Age: 68
End: 2018-02-15

## 2018-02-15 DIAGNOSIS — J40 BRONCHITIS: Primary | ICD-10-CM

## 2018-02-15 RX ORDER — AZITHROMYCIN 250 MG/1
TABLET, FILM COATED ORAL
Qty: 1 PACKET | Refills: 0 | Status: SHIPPED | OUTPATIENT
Start: 2018-02-15 | End: 2018-02-21 | Stop reason: SDUPTHER

## 2018-02-21 DIAGNOSIS — J40 BRONCHITIS: Primary | ICD-10-CM

## 2018-02-21 RX ORDER — AZITHROMYCIN 250 MG/1
TABLET, FILM COATED ORAL
Qty: 1 PACKET | Refills: 1 | Status: SHIPPED | OUTPATIENT
Start: 2018-02-21 | End: 2018-03-03

## 2018-03-28 ENCOUNTER — HOSPITAL ENCOUNTER (OUTPATIENT)
Dept: OTHER | Age: 68
Discharge: HOME | End: 2018-03-28
Attending: INTERNAL MEDICINE | Admitting: INTERNAL MEDICINE

## 2018-03-28 ENCOUNTER — OFFICE VISIT (OUTPATIENT)
Dept: INTERNAL MEDICINE CLINIC | Age: 68
End: 2018-03-28

## 2018-03-28 VITALS
SYSTOLIC BLOOD PRESSURE: 114 MMHG | BODY MASS INDEX: 36.68 KG/M2 | WEIGHT: 262 LBS | HEIGHT: 71 IN | DIASTOLIC BLOOD PRESSURE: 68 MMHG | HEART RATE: 64 BPM

## 2018-03-28 DIAGNOSIS — E29.1 PRIMARY MALE HYPOGONADISM: Chronic | ICD-10-CM

## 2018-03-28 DIAGNOSIS — E03.9 ACQUIRED HYPOTHYROIDISM: Primary | ICD-10-CM

## 2018-03-28 DIAGNOSIS — Z12.5 PROSTATE CANCER SCREENING: ICD-10-CM

## 2018-03-28 DIAGNOSIS — I10 ESSENTIAL HYPERTENSION: Chronic | ICD-10-CM

## 2018-03-28 LAB — PROSTATE SPECIFIC ANTIGEN: 2.17 NG/ML (ref 0–4)

## 2018-03-28 PROCEDURE — 99214 OFFICE O/P EST MOD 30 MIN: CPT | Performed by: INTERNAL MEDICINE

## 2018-03-28 ASSESSMENT — ENCOUNTER SYMPTOMS
WHEEZING: 0
NAUSEA: 0
SHORTNESS OF BREATH: 0
ABDOMINAL PAIN: 0
VOMITING: 0

## 2018-03-28 NOTE — PROGRESS NOTES
Ladonna Hernandez  1950  male  75 y.o. SUBJECTIVE:    Chief Complaint   Patient presents with    Follow-up     4 month    Diabetes     next appt with Dr. Priscilla Amaya 5-2       HPI:  Patient is here for follow-up visit of chronic problems. Last hemoglobin A1c was 9.4. However he states his blood sugar runs from 140-180. Dr. David Olivas has been adjusting his insulin both short-acting and long-acting. He denies hypoglycemic symptoms. He continued to have neuropathy symptoms in both legs. He denies chest pain shortness of breath or leg swelling. He has been taking testosterone supplement. He feels more energetic as well as improvement of libido. Thyroid Problem:    Patient denies decreased or increased weight. Not feeling cold/chilly or excessively warm. Denies undue sweatiness, anxiety or palpitations. Bowel habit is normal. Overall no symptoms of hypo or hyperthyroidism. Past Medical History:   Diagnosis Date    Hearing loss     Hyperthyroidism     Hypogonadism in male     Kidney stones     MAO (obstructive sleep apnea)     Type II or unspecified type diabetes mellitus without mention of complication, not stated as uncontrolled      Past Surgical History:   Procedure Laterality Date    CHOLECYSTECTOMY      9/11    COLONOSCOPY      2009    THYROIDECTOMY      lt lobectomy 2/2010    TONSILLECTOMY       Social History     Social History    Marital status:       Spouse name: N/A    Number of children: N/A    Years of education: N/A     Social History Main Topics    Smoking status: Never Smoker    Smokeless tobacco: Never Used    Alcohol use No    Drug use: No    Sexual activity: Not Asked     Other Topics Concern    None     Social History Narrative    None     Family History   Problem Relation Age of Onset    Cancer Father      skin    Coronary Art Dis Father     Diabetes Father     Coronary Art Dis Paternal Uncle     Diabetes Paternal Uncle     Stroke Paternal Uncle     Heart Disease Neg Hx     High Blood Pressure Neg Hx     Osteoporosis Neg Hx     Thyroid Disease Neg Hx        Review of Systems   Constitutional: Negative for fever and unexpected weight change. Respiratory: Negative for shortness of breath and wheezing. Cardiovascular: Negative for chest pain and palpitations. Gastrointestinal: Negative for abdominal pain, nausea and vomiting. Endocrine: Negative for polydipsia and polyphagia. Neurological: Negative for dizziness and light-headedness. OBJECTIVE:  Pulse Readings from Last 4 Encounters:   03/28/18 64   02/06/18 68   02/01/18 68   11/27/17 72      Wt Readings from Last 4 Encounters:   03/28/18 262 lb (118.8 kg)   02/06/18 266 lb (120.7 kg)   02/01/18 266 lb 12.8 oz (121 kg)   11/27/17 271 lb (122.9 kg)     BP Readings from Last 4 Encounters:   03/28/18 114/68   02/06/18 118/62   02/01/18 122/71   11/27/17 128/78     Physical Exam   Constitutional: He is oriented to person, place, and time. He appears well-developed and well-nourished. No distress. obese   Eyes: Conjunctivae and EOM are normal. Pupils are equal, round, and reactive to light. Neck: No thyromegaly present. Cardiovascular: Normal rate, regular rhythm and normal heart sounds. No murmur heard. Pulmonary/Chest: Effort normal and breath sounds normal. No respiratory distress. He has no wheezes. Musculoskeletal: He exhibits no edema. Lymphadenopathy:     He has no cervical adenopathy. Neurological: He is alert and oriented to person, place, and time. Skin: Skin is warm and dry. Nursing note and vitals reviewed.       CBC:   Lab Results   Component Value Date    WBC 9.0 02/23/2017    HGB 13.3 02/23/2017    HCT 40.3 02/23/2017     02/23/2017     CMP:   Lab Results   Component Value Date     02/05/2018    K 4.6 02/05/2018     02/05/2018    CO2 27 02/05/2018    ANIONGAP 13 02/05/2018    GLUCOSE 151 02/05/2018    BUN 18 02/05/2018    CREATININE 1.1 02/05/2018    GFRAA >60 02/05/2018    GFRAA >60 01/19/2013    CALCIUM 9.8 02/05/2018    PROT 7.5 02/05/2018    PROT 7.5 02/16/2013    LABALBU 4.4 02/05/2018    AGRATIO 1.4 02/05/2018    BILITOT 0.3 02/05/2018    ALKPHOS 94 02/05/2018    ALT 45 02/05/2018    AST 34 02/05/2018    GLOB 3.1 02/05/2018     URINALYSIS:   Lab Results   Component Value Date    LABMICR 3.60 02/05/2018     HBA1C:   Lab Results   Component Value Date    LABA1C 9.4 02/05/2018    .1 02/05/2018     MICRO/ALB:   Lab Results   Component Value Date    LABMICR 3.60 02/05/2018    LABCREA 195.5 02/05/2018    MALBCR 18.4 02/05/2018     LIPID:   Lab Results   Component Value Date    CHOL 143 02/05/2018    TRIG 353 02/05/2018    HDL 27 02/05/2018    HDL 27 03/17/2012    LDLCALC see below 02/05/2018    LABVLDL see below 02/05/2018     TSH:   Lab Results   Component Value Date    TSHREFLEX 0.80 09/26/2016     PSA:   Lab Results   Component Value Date    PSA 1.71 02/23/2017        ASSESSMENT/PLAN:    Ana Salcedo was seen today for follow-up and diabetes. Diagnoses and all orders for this visit:    Acquired hypothyroidism  TSH on February 5 2018 was 1.93  The current medical regimen is effective;  continue present plan and medications. Essential hypertension  Well-controlled. continue current medications  Type 2 diabetes, uncontrolled, with neuropathy ([address]  Management per endocrinologist.. She lost 4 pounds since last visit. Taking increasing doses of insulin  Prostate cancer screening  -     PSA Screening; Future    Primary male hypogonadism. Continue  -     Testosterone, free, total; Future              Orders Placed This Encounter   Procedures    PSA Screening     Standing Status:   Future     Standing Expiration Date:   3/28/2019    Testosterone, free, total     Standing Status:   Future     Standing Expiration Date:   3/28/2019     Current Outpatient Prescriptions   Medication Sig Dispense Refill    HUMALOG KWIKPEN 200 UNIT/ML SOPN pen INJECT 30 UNITS BEFORE MEALS AS DIRECTED (PRESCRIBER OK IS NEEDED FOR NEXT FILL) 12 mL 5    Turmeric 500 MG CAPS Take 1,000 mg by mouth daily      insulin glargine (LANTUS SOLOSTAR) 100 UNIT/ML injection pen INJECT 90 UNITS AT BEDTIME **REFRIGERATE** 45 mL 5    atorvastatin (LIPITOR) 10 MG tablet TAKE ONE TABLET DAILY FOR CHOLESTEROL (GENERIC FOR LIPITOR) (NEW RX OR MD NEEDS TO BE CONTACTED FOR MORE REFILLS) 90 tablet 1    amLODIPine (NORVASC) 10 MG tablet Take 1 tablet by mouth daily 90 tablet 3    potassium citrate (UROCIT-K) 10 MEQ (1080 MG) extended release tablet Take 1 tablet by mouth daily 90 tablet 3    allopurinol (ZYLOPRIM) 300 MG tablet Take 1 tablet by mouth daily 90 tablet 3    Dulaglutide (TRULICITY) 1.5 TQ/1.1SM SOPN Inject 1.5 mg into the skin once a week 4 Pen 5    B-D UF III MINI PEN NEEDLES 31G X 5 MM MISC USE 6 TIMES PER DAY OR AS DIRECTED FOR LANTUS - HUMALOG 200 each 5    omega-3 acid ethyl esters (LOVAZA) 1 g capsule 1 CAPSULE 2 TIMES A DAY 60 capsule 11    levothyroxine (SYNTHROID) 100 MCG tablet 1 TABLET BY MOUTH ONCE DAILY (NEW RX OR MD NEEDS TO BE CONTACTED FOR MORE REFILLS) 90 tablet 5    benazepril (LOTENSIN) 40 MG tablet Take 1 tablet by mouth daily 90 tablet 3    vitamin D (ERGOCALCIFEROL) 04738 units CAPS capsule 1 CAPSULE EVERY 14 DAYS. 6 capsule 3    UNABLE TO FIND CPAP supplies, water reservoir and tubing. Dx 327.23 1 each 0    Blood Glucose Monitoring Suppl (ONE TOUCH ULTRA SYSTEM KIT) W/DEVICE KIT AS DIRECTED 1 kit 0    aspirin EC 81 MG EC tablet Take 1 tablet by mouth daily. 30 tablet 11    Multiple Vitamin (MULTIVITAMIN PO) Take  by mouth daily.  Testosterone (ANDROGEL PUMP) 20.25 MG/ACT (1.62%) GEL gel Place 2 actuation onto the skin daily for 30 doses. 1 Bottle 3    glucose blood VI test strips (ONE TOUCH ULTRA TEST) strip Testing 2-5 x per day. Unstable bloodsugars.   Insulin dependent 150 strip 2     No current facility-administered medications for this

## 2018-03-30 LAB
SEX HORMONE BINDING GLOBULIN: 23 NMOL/L (ref 11–80)
TESTOSTERONE FREE-NONMALE: 37.9 PG/ML (ref 47–244)
TESTOSTERONE TOTAL: 166 NG/DL (ref 220–1000)

## 2018-03-30 RX ORDER — TESTOSTERONE 16.2 MG/G
2 GEL TRANSDERMAL DAILY
Qty: 1 BOTTLE | Refills: 3 | Status: SHIPPED | OUTPATIENT
Start: 2018-03-30 | End: 2018-05-24 | Stop reason: SDUPTHER

## 2018-04-27 DIAGNOSIS — Z79.4 TYPE 2 DIABETES MELLITUS WITH DIABETIC POLYNEUROPATHY, WITH LONG-TERM CURRENT USE OF INSULIN (HCC): Chronic | ICD-10-CM

## 2018-04-27 DIAGNOSIS — E11.42 TYPE 2 DIABETES MELLITUS WITH DIABETIC POLYNEUROPATHY, WITH LONG-TERM CURRENT USE OF INSULIN (HCC): Chronic | ICD-10-CM

## 2018-05-03 ENCOUNTER — OFFICE VISIT (OUTPATIENT)
Dept: ENDOCRINOLOGY | Age: 68
End: 2018-05-03

## 2018-05-03 VITALS
HEIGHT: 71 IN | SYSTOLIC BLOOD PRESSURE: 130 MMHG | BODY MASS INDEX: 37.74 KG/M2 | DIASTOLIC BLOOD PRESSURE: 62 MMHG | OXYGEN SATURATION: 95 % | WEIGHT: 269.6 LBS | HEART RATE: 71 BPM

## 2018-05-03 DIAGNOSIS — I10 ESSENTIAL HYPERTENSION: Chronic | ICD-10-CM

## 2018-05-03 DIAGNOSIS — E03.9 ACQUIRED HYPOTHYROIDISM: ICD-10-CM

## 2018-05-03 DIAGNOSIS — E55.9 VITAMIN D DEFICIENCY: ICD-10-CM

## 2018-05-03 DIAGNOSIS — E78.2 MIXED HYPERLIPIDEMIA: ICD-10-CM

## 2018-05-03 LAB — HBA1C MFR BLD: 9.3 %

## 2018-05-03 PROCEDURE — 83036 HEMOGLOBIN GLYCOSYLATED A1C: CPT | Performed by: INTERNAL MEDICINE

## 2018-05-03 PROCEDURE — 99214 OFFICE O/P EST MOD 30 MIN: CPT | Performed by: INTERNAL MEDICINE

## 2018-05-03 RX ORDER — OMEGA-3-ACID ETHYL ESTERS 1 G/1
2 CAPSULE, LIQUID FILLED ORAL 2 TIMES DAILY
Qty: 60 CAPSULE | Refills: 11 | Status: SHIPPED
Start: 2018-05-03 | End: 2020-03-11 | Stop reason: SDUPTHER

## 2018-05-03 ASSESSMENT — PATIENT HEALTH QUESTIONNAIRE - PHQ9
SUM OF ALL RESPONSES TO PHQ QUESTIONS 1-9: 0
SUM OF ALL RESPONSES TO PHQ9 QUESTIONS 1 & 2: 0
2. FEELING DOWN, DEPRESSED OR HOPELESS: 0
1. LITTLE INTEREST OR PLEASURE IN DOING THINGS: 0

## 2018-05-23 ENCOUNTER — TELEPHONE (OUTPATIENT)
Dept: ORTHOPEDIC SURGERY | Age: 68
End: 2018-05-23

## 2018-05-24 ENCOUNTER — TELEPHONE (OUTPATIENT)
Dept: INTERNAL MEDICINE CLINIC | Age: 68
End: 2018-05-24

## 2018-05-24 RX ORDER — TESTOSTERONE 16.2 MG/G
2 GEL TRANSDERMAL DAILY
Qty: 1 BOTTLE | Refills: 3 | Status: SHIPPED | OUTPATIENT
Start: 2018-05-24 | End: 2018-08-03 | Stop reason: SDUPTHER

## 2018-05-29 RX ORDER — INSULIN GLARGINE 100 [IU]/ML
INJECTION, SOLUTION SUBCUTANEOUS
Qty: 45 ML | Refills: 3 | Status: SHIPPED | OUTPATIENT
Start: 2018-05-29 | End: 2018-08-23 | Stop reason: SDUPTHER

## 2018-06-15 ENCOUNTER — TELEPHONE (OUTPATIENT)
Dept: ENDOCRINOLOGY | Age: 68
End: 2018-06-15

## 2018-06-25 ENCOUNTER — HOSPITAL ENCOUNTER (OUTPATIENT)
Dept: DIABETES SERVICES | Age: 68
Discharge: HOME | End: 2018-06-30
Admitting: INTERNAL MEDICINE

## 2018-06-25 DIAGNOSIS — E11.40 TYPE 2 DIABETES MELLITUS WITH DIABETIC NEUROPATHY (HCC): ICD-10-CM

## 2018-06-25 NOTE — PROGRESS NOTES
Annual Review Assessment:  Health Literacy: adequate  Carb Counting: adequate  Sleep Screen: has MAO, wears C-Pap  PHQ9: 0      Instruction included:  [x] carb counting  [x] activity/exercise  [x] label reading  [x] monitoring frequency  [x] hypoglycemia prevention/treatment  [x] insulin management  [] other:    Education and Support Plan: Follow up for Annual Review in 2 weeks.     Referring Provider: Maria Fernanda Ross MD

## 2018-07-01 ENCOUNTER — HOSPITAL ENCOUNTER (OUTPATIENT)
Dept: OTHER | Age: 68
Discharge: HOME | End: 2018-07-01
Attending: INTERNAL MEDICINE | Admitting: INTERNAL MEDICINE

## 2018-07-09 ENCOUNTER — TELEPHONE (OUTPATIENT)
Dept: ENDOCRINOLOGY | Age: 68
End: 2018-07-09

## 2018-07-09 ENCOUNTER — HOSPITAL ENCOUNTER (OUTPATIENT)
Dept: DIABETES SERVICES | Age: 68
Discharge: HOME | End: 2018-07-10
Admitting: INTERNAL MEDICINE

## 2018-07-09 DIAGNOSIS — E11.40 TYPE 2 DIABETES MELLITUS WITH DIABETIC NEUROPATHY (HCC): ICD-10-CM

## 2018-07-09 RX ORDER — ERGOCALCIFEROL 1.25 MG/1
CAPSULE ORAL
Qty: 6 CAPSULE | Refills: 3 | Status: SHIPPED | OUTPATIENT
Start: 2018-07-09 | End: 2018-12-13 | Stop reason: SDUPTHER

## 2018-07-10 NOTE — TELEPHONE ENCOUNTER
I reviewed dietician note. Agree with Metformin. If patient is OK with that, start Metformin 500 mg bid with breakfast and lunch, and increase to 2 tabs bid if tolerated. Let me know where to send Rx.

## 2018-08-15 ENCOUNTER — HOSPITAL ENCOUNTER (OUTPATIENT)
Dept: OTHER | Age: 68
Discharge: HOME | End: 2018-08-15
Attending: INTERNAL MEDICINE | Admitting: INTERNAL MEDICINE

## 2018-08-15 DIAGNOSIS — E78.2 MIXED HYPERLIPIDEMIA: ICD-10-CM

## 2018-08-15 DIAGNOSIS — E55.9 VITAMIN D DEFICIENCY: ICD-10-CM

## 2018-08-15 DIAGNOSIS — E03.9 ACQUIRED HYPOTHYROIDISM: ICD-10-CM

## 2018-08-15 LAB
A/G RATIO: 1.5 (ref 1.1–2.2)
ALBUMIN SERPL-MCNC: 4.4 G/DL (ref 3.4–5)
ALP BLD-CCNC: 74 U/L (ref 40–129)
ALT SERPL-CCNC: 46 U/L (ref 10–40)
ANION GAP SERPL CALCULATED.3IONS-SCNC: 12 MMOL/L (ref 3–16)
AST SERPL-CCNC: 43 U/L (ref 15–37)
BILIRUB SERPL-MCNC: 0.4 MG/DL (ref 0–1)
BUN BLDV-MCNC: 14 MG/DL (ref 7–20)
CALCIUM SERPL-MCNC: 9.6 MG/DL (ref 8.3–10.6)
CHLORIDE BLD-SCNC: 104 MMOL/L (ref 99–110)
CHOLESTEROL, TOTAL: 149 MG/DL (ref 0–199)
CO2: 27 MMOL/L (ref 21–32)
CREAT SERPL-MCNC: 1 MG/DL (ref 0.8–1.3)
ESTIMATED AVERAGE GLUCOSE: 246 MG/DL
GFR AFRICAN AMERICAN: >60
GFR NON-AFRICAN AMERICAN: >60
GLOBULIN: 2.9 G/DL
GLUCOSE BLD-MCNC: 90 MG/DL (ref 70–99)
HBA1C MFR BLD: 10.2 %
HDLC SERPL-MCNC: 28 MG/DL (ref 40–60)
LDL CHOLESTEROL CALCULATED: 70 MG/DL
POTASSIUM SERPL-SCNC: 4 MMOL/L (ref 3.5–5.1)
SODIUM BLD-SCNC: 143 MMOL/L (ref 136–145)
T4 FREE: 1.3 NG/DL (ref 0.9–1.8)
TOTAL PROTEIN: 7.3 G/DL (ref 6.4–8.2)
TRIGL SERPL-MCNC: 255 MG/DL (ref 0–150)
TSH SERPL DL<=0.05 MIU/L-ACNC: 1.22 UIU/ML (ref 0.27–4.2)
VITAMIN D 25-HYDROXY: 44.6 NG/ML
VLDLC SERPL CALC-MCNC: 51 MG/DL

## 2018-08-23 ENCOUNTER — OFFICE VISIT (OUTPATIENT)
Dept: ENDOCRINOLOGY | Age: 68
End: 2018-08-23

## 2018-08-23 VITALS
DIASTOLIC BLOOD PRESSURE: 66 MMHG | SYSTOLIC BLOOD PRESSURE: 124 MMHG | WEIGHT: 267.6 LBS | BODY MASS INDEX: 37.46 KG/M2 | OXYGEN SATURATION: 98 % | HEART RATE: 69 BPM | HEIGHT: 71 IN

## 2018-08-23 DIAGNOSIS — I10 ESSENTIAL HYPERTENSION: Chronic | ICD-10-CM

## 2018-08-23 DIAGNOSIS — E78.2 MIXED HYPERLIPIDEMIA: ICD-10-CM

## 2018-08-23 DIAGNOSIS — E55.9 VITAMIN D DEFICIENCY: ICD-10-CM

## 2018-08-23 DIAGNOSIS — E03.9 ACQUIRED HYPOTHYROIDISM: ICD-10-CM

## 2018-08-23 PROBLEM — E66.812 CLASS 2 OBESITY IN ADULT: Status: ACTIVE | Noted: 2018-08-23

## 2018-08-23 PROBLEM — E66.9 CLASS 2 OBESITY IN ADULT: Status: ACTIVE | Noted: 2018-08-23

## 2018-08-23 PROCEDURE — 99214 OFFICE O/P EST MOD 30 MIN: CPT | Performed by: INTERNAL MEDICINE

## 2018-08-23 ASSESSMENT — PATIENT HEALTH QUESTIONNAIRE - PHQ9
SUM OF ALL RESPONSES TO PHQ9 QUESTIONS 1 & 2: 0
1. LITTLE INTEREST OR PLEASURE IN DOING THINGS: 0
SUM OF ALL RESPONSES TO PHQ QUESTIONS 1-9: 0
SUM OF ALL RESPONSES TO PHQ QUESTIONS 1-9: 0
2. FEELING DOWN, DEPRESSED OR HOPELESS: 0

## 2018-08-23 NOTE — PROGRESS NOTES
(MULTIVITAMIN PO) Take  by mouth daily. No current facility-administered medications for this visit.       Allergies   Allergen Reactions    Demerol      vomiting     Family Status   Relation Status    Father     Mother Alive    PUnc (Not Specified)    Neg Hx (Not Specified)       Review of Systems  Constitutional: has fatigue, no fever, no recent weight gain, no recent weight loss, no changes in appetite  Eyes: no eye pain, no change in vision, no eye redness, no eye irritation, no double vision  Ears, nose, throat: no nasal congestion, no sore throat, no earache, no decrease in hearing, no hoarseness, no dry mouth, no sinus problems, no difficulty swallowing, no neck lumps, no dental problems, no mouth sores, no ringing in ears  Pulmonary: no shortness of breath, no wheezing, no dyspnea on exertion, no cough  Cardiovascular: no chest pain, no lower extremity edema, no orthopnea, no intermittent leg claudication, no palpitations  Gastrointestinal: no abdominal pain, no nausea, no vomiting, no diarrhea, no constipation, no dysphagia, no heartburn, no bloating  Genitourinary: no dysuria, no urinary incontinence, no urinary hesitancy, no urinary frequency, no feelings of urinary urgency, no nocturia  Musculoskeletal: no joint swelling, no joint stiffness, no joint pain, no muscle cramps, no muscle pain, no bone pain, no fractures  Integument/Breast:  no hair loss, no skin rashes, no skin lesions, no itching, no dry skin  Neurological: Has numbness, has tingling, no weakness, no confusion, no headaches, no dizziness, no fainting, no tremors, no decrease in memory, no balance problems  Psychiatric: no anxiety, no depression, no insomnia  Hematologic/Lymphatic: no tendency for easy bleeding, no swollen lymph nodes, no tendency for easy bruising  Immunology: has seasonal allergies, no frequent infections, no frequent illnesses  Endocrine: no temperature intolerance     OBJECTIVE:  /66 (Site: Left

## 2018-09-26 ENCOUNTER — OFFICE VISIT (OUTPATIENT)
Dept: INTERNAL MEDICINE CLINIC | Age: 68
End: 2018-09-26
Payer: MEDICARE

## 2018-09-26 ENCOUNTER — TELEPHONE (OUTPATIENT)
Dept: ENDOCRINOLOGY | Age: 68
End: 2018-09-26

## 2018-09-26 VITALS
HEIGHT: 71 IN | DIASTOLIC BLOOD PRESSURE: 80 MMHG | BODY MASS INDEX: 37.38 KG/M2 | HEART RATE: 72 BPM | SYSTOLIC BLOOD PRESSURE: 116 MMHG | WEIGHT: 267 LBS

## 2018-09-26 DIAGNOSIS — Z23 NEED FOR INFLUENZA VACCINATION: Primary | ICD-10-CM

## 2018-09-26 DIAGNOSIS — I10 ESSENTIAL HYPERTENSION: Chronic | ICD-10-CM

## 2018-09-26 DIAGNOSIS — Z23 NEED FOR PROPHYLACTIC VACCINATION AND INOCULATION AGAINST VARICELLA: ICD-10-CM

## 2018-09-26 DIAGNOSIS — E29.1 PRIMARY MALE HYPOGONADISM: Chronic | ICD-10-CM

## 2018-09-26 DIAGNOSIS — E03.9 ACQUIRED HYPOTHYROIDISM: ICD-10-CM

## 2018-09-26 DIAGNOSIS — E78.2 MIXED HYPERLIPIDEMIA: ICD-10-CM

## 2018-09-26 PROCEDURE — 90662 IIV NO PRSV INCREASED AG IM: CPT | Performed by: INTERNAL MEDICINE

## 2018-09-26 PROCEDURE — 99214 OFFICE O/P EST MOD 30 MIN: CPT | Performed by: INTERNAL MEDICINE

## 2018-09-26 PROCEDURE — G0008 ADMIN INFLUENZA VIRUS VAC: HCPCS | Performed by: INTERNAL MEDICINE

## 2018-09-26 RX ORDER — ATORVASTATIN CALCIUM 10 MG/1
TABLET, FILM COATED ORAL
Qty: 90 TABLET | Refills: 1 | Status: SHIPPED | OUTPATIENT
Start: 2018-09-26 | End: 2019-02-23 | Stop reason: SDUPTHER

## 2018-09-26 ASSESSMENT — ENCOUNTER SYMPTOMS
VOMITING: 0
PHOTOPHOBIA: 0
WHEEZING: 0
NAUSEA: 0
SHORTNESS OF BREATH: 0
ABDOMINAL PAIN: 0

## 2018-09-26 NOTE — PROGRESS NOTES
Angie Russ  1950  male  70 y.o. SUBJECTIVE:    Chief Complaint   Patient presents with    6 Month Follow-Up    Diabetes     seeing Dr. Neri Leyva, aware to schedule eye exam.       HPI:  Follow-up visit. Patient blood sugar has been running from 182-220. He is reluctant to increase Lantus since he is following up with endocrinologist.  Last hemoglobin A1c is above 10. He is going to schedule with the ophthalmologist.    He denies any muscle pain or GI symptoms. Continue to take atorvastatin    Hypertension:    Angie Russ returns for follow up of hypertension. Tolerating medications well and taking them as directed. No symptoms (denies chest pain,dyspnea,edema or TIA's or blurred vision) concerning for end organ damage are present. Current medication androgel helping him. Thyroid Problem:    Patient denies decreased or increased weight. Not feeling cold/chilly or excessively warm. Denies undue sweatiness, anxiety or palpitations. Bowel habit is normal. Overall no symptoms of hypo or hyperthyroidism. Past Medical History:   Diagnosis Date    Hearing loss     Hyperthyroidism     Hypogonadism in male     Kidney stones     MAO (obstructive sleep apnea)     Type II or unspecified type diabetes mellitus without mention of complication, not stated as uncontrolled      Past Surgical History:   Procedure Laterality Date    CHOLECYSTECTOMY      9/11    COLONOSCOPY      2009    THYROIDECTOMY      lt lobectomy 2/2010    TONSILLECTOMY       Social History     Social History    Marital status:       Spouse name: N/A    Number of children: N/A    Years of education: N/A     Occupational History    retired pediatrician      Social History Main Topics    Smoking status: Never Smoker    Smokeless tobacco: Never Used    Alcohol use 0.6 oz/week     1 Cans of beer per week      Comment: 4 of 7 days     Drug use: No    Sexual activity: No     Other Topics Concern    None     Social Results   Component Value Date     08/15/2018    K 4.0 08/15/2018     08/15/2018    CO2 27 08/15/2018    ANIONGAP 12 08/15/2018    GLUCOSE 90 08/15/2018    BUN 14 08/15/2018    CREATININE 1.0 08/15/2018    GFRAA >60 08/15/2018    GFRAA >60 01/19/2013    CALCIUM 9.6 08/15/2018    PROT 7.3 08/15/2018    PROT 7.5 02/16/2013    LABALBU 4.4 08/15/2018    AGRATIO 1.5 08/15/2018    BILITOT 0.4 08/15/2018    ALKPHOS 74 08/15/2018    ALT 46 08/15/2018    AST 43 08/15/2018    GLOB 2.9 08/15/2018     URINALYSIS:   Lab Results   Component Value Date    LABMICR 3.60 02/05/2018     HBA1C:   Lab Results   Component Value Date    LABA1C 10.2 08/15/2018    .0 08/15/2018     MICRO/ALB:   Lab Results   Component Value Date    LABMICR 3.60 02/05/2018    LABCREA 195.5 02/05/2018    MALBCR 18.4 02/05/2018     LIPID:   Lab Results   Component Value Date    CHOL 149 08/15/2018    TRIG 255 08/15/2018    HDL 28 08/15/2018    HDL 27 03/17/2012    LDLCALC 70 08/15/2018    LABVLDL 51 08/15/2018     TSH:   Lab Results   Component Value Date    TSHREFLEX 0.80 09/26/2016     PSA:   Lab Results   Component Value Date    PSA 2.17 03/28/2018        ASSESSMENT/PLAN:    [facility] was seen today for 6 month follow-up and diabetes. Diagnoses and all orders for this visit:    Type 2 diabetes, uncontrolled, with neuropathy ([facility] [address]  Uncontrolled despite multiple medications. Primary male hypogonadism  We'll continue testosterone supplement. Essential hypertension  Well-controlled with current medication  Acquired hypothyroidism  Continue levothyroxine. Pending thyroid test  Mixed hyperlipidemia  -     atorvastatin (LIPITOR) 10 MG tablet; TAKE ONE TABLET DAILY FOR CHOLESTEROL    Need for prophylactic vaccination and inoculation against varicella  -     zoster recombinant adjuvanted vaccine (SHINGRIX) 50 MCG SUSR injection; 50 MCG IM then repeat 2-6 months. No orders of the defined types were placed in this encounter.     Current

## 2018-09-27 ENCOUNTER — TELEPHONE (OUTPATIENT)
Dept: ENDOCRINOLOGY | Age: 68
End: 2018-09-27

## 2018-09-27 NOTE — TELEPHONE ENCOUNTER
I left a message to patient that we need to adjust his insulin regimen to control diabetes better. I will call again.

## 2018-09-27 NOTE — TELEPHONE ENCOUNTER
I called patient. I recommended first to change his Lantus to 45 units bid, if BG remain 160-180, then increase to 50 units bid. Patient agreed. I asked him to call me in 2-4 weeks and let me know how are BG readings. Then we consider on luzmaria date.

## 2018-11-20 DIAGNOSIS — Z79.4 TYPE 2 DIABETES MELLITUS WITH COMPLICATION, WITH LONG-TERM CURRENT USE OF INSULIN (HCC): ICD-10-CM

## 2018-11-20 DIAGNOSIS — E11.8 TYPE 2 DIABETES MELLITUS WITH COMPLICATION, WITH LONG-TERM CURRENT USE OF INSULIN (HCC): ICD-10-CM

## 2018-12-10 ENCOUNTER — HOSPITAL ENCOUNTER (OUTPATIENT)
Age: 68
Discharge: HOME | End: 2018-12-10
Payer: MEDICARE

## 2018-12-10 DIAGNOSIS — E78.2 MIXED HYPERLIPIDEMIA: ICD-10-CM

## 2018-12-10 DIAGNOSIS — E03.9 ACQUIRED HYPOTHYROIDISM: ICD-10-CM

## 2018-12-10 DIAGNOSIS — E55.9 VITAMIN D DEFICIENCY: ICD-10-CM

## 2018-12-10 LAB
A/G RATIO: 1.3 (ref 1.1–2.2)
ALBUMIN SERPL-MCNC: 4 G/DL (ref 3.4–5)
ALP BLD-CCNC: 79 U/L (ref 40–129)
ALT SERPL-CCNC: 37 U/L (ref 10–40)
ANION GAP SERPL CALCULATED.3IONS-SCNC: 14 MMOL/L (ref 3–16)
AST SERPL-CCNC: 30 U/L (ref 15–37)
BILIRUB SERPL-MCNC: 0.5 MG/DL (ref 0–1)
BUN BLDV-MCNC: 17 MG/DL (ref 7–20)
CALCIUM SERPL-MCNC: 9.7 MG/DL (ref 8.3–10.6)
CHLORIDE BLD-SCNC: 102 MMOL/L (ref 99–110)
CHOLESTEROL, TOTAL: 144 MG/DL (ref 0–199)
CO2: 23 MMOL/L (ref 21–32)
CREAT SERPL-MCNC: 1 MG/DL (ref 0.8–1.3)
CREATININE URINE: 87.4 MG/DL (ref 39–259)
ESTIMATED AVERAGE GLUCOSE: 228.8 MG/DL
GFR AFRICAN AMERICAN: >60
GFR NON-AFRICAN AMERICAN: >60
GLOBULIN: 3.1 G/DL
GLUCOSE BLD-MCNC: 138 MG/DL (ref 70–99)
HBA1C MFR BLD: 9.6 %
HDLC SERPL-MCNC: 26 MG/DL (ref 40–60)
LDL CHOLESTEROL CALCULATED: 59 MG/DL
MICROALBUMIN UR-MCNC: 2.3 MG/DL
MICROALBUMIN/CREAT UR-RTO: 26.3 MG/G (ref 0–30)
POTASSIUM SERPL-SCNC: 4.3 MMOL/L (ref 3.5–5.1)
SODIUM BLD-SCNC: 139 MMOL/L (ref 136–145)
T4 FREE: 1.4 NG/DL (ref 0.9–1.8)
TOTAL PROTEIN: 7.1 G/DL (ref 6.4–8.2)
TRIGL SERPL-MCNC: 296 MG/DL (ref 0–150)
TSH SERPL DL<=0.05 MIU/L-ACNC: 0.68 UIU/ML (ref 0.27–4.2)
VITAMIN D 25-HYDROXY: 39.7 NG/ML
VLDLC SERPL CALC-MCNC: 59 MG/DL

## 2018-12-10 PROCEDURE — 82306 VITAMIN D 25 HYDROXY: CPT

## 2018-12-10 PROCEDURE — 84439 ASSAY OF FREE THYROXINE: CPT

## 2018-12-10 PROCEDURE — 36415 COLL VENOUS BLD VENIPUNCTURE: CPT

## 2018-12-10 PROCEDURE — 80053 COMPREHEN METABOLIC PANEL: CPT

## 2018-12-10 PROCEDURE — 80061 LIPID PANEL: CPT

## 2018-12-10 PROCEDURE — 83036 HEMOGLOBIN GLYCOSYLATED A1C: CPT

## 2018-12-10 PROCEDURE — 82043 UR ALBUMIN QUANTITATIVE: CPT

## 2018-12-10 PROCEDURE — 82570 ASSAY OF URINE CREATININE: CPT

## 2018-12-10 PROCEDURE — 84443 ASSAY THYROID STIM HORMONE: CPT

## 2018-12-13 ENCOUNTER — OFFICE VISIT (OUTPATIENT)
Dept: ENDOCRINOLOGY | Age: 68
End: 2018-12-13
Payer: MEDICARE

## 2018-12-13 VITALS
BODY MASS INDEX: 37.83 KG/M2 | WEIGHT: 270.2 LBS | DIASTOLIC BLOOD PRESSURE: 65 MMHG | HEIGHT: 71 IN | HEART RATE: 70 BPM | SYSTOLIC BLOOD PRESSURE: 131 MMHG

## 2018-12-13 DIAGNOSIS — Z79.4 TYPE 2 DIABETES MELLITUS WITH DIABETIC POLYNEUROPATHY, WITH LONG-TERM CURRENT USE OF INSULIN (HCC): Chronic | ICD-10-CM

## 2018-12-13 DIAGNOSIS — E11.42 TYPE 2 DIABETES MELLITUS WITH DIABETIC POLYNEUROPATHY, WITH LONG-TERM CURRENT USE OF INSULIN (HCC): Chronic | ICD-10-CM

## 2018-12-13 DIAGNOSIS — E03.9 ACQUIRED HYPOTHYROIDISM: ICD-10-CM

## 2018-12-13 DIAGNOSIS — I10 ESSENTIAL HYPERTENSION: Chronic | ICD-10-CM

## 2018-12-13 DIAGNOSIS — E55.9 VITAMIN D DEFICIENCY: ICD-10-CM

## 2018-12-13 DIAGNOSIS — E66.01 CLASS 2 SEVERE OBESITY WITH SERIOUS COMORBIDITY AND BODY MASS INDEX (BMI) OF 37.0 TO 37.9 IN ADULT, UNSPECIFIED OBESITY TYPE (HCC): ICD-10-CM

## 2018-12-13 DIAGNOSIS — E78.2 MIXED HYPERLIPIDEMIA: ICD-10-CM

## 2018-12-13 PROCEDURE — 99214 OFFICE O/P EST MOD 30 MIN: CPT | Performed by: INTERNAL MEDICINE

## 2018-12-13 RX ORDER — ERGOCALCIFEROL 1.25 MG/1
CAPSULE ORAL
Qty: 12 CAPSULE | Refills: 3 | Status: SHIPPED | OUTPATIENT
Start: 2018-12-13 | End: 2019-07-03 | Stop reason: SDUPTHER

## 2018-12-13 RX ORDER — LEVOTHYROXINE SODIUM 0.1 MG/1
TABLET ORAL
Qty: 90 TABLET | Refills: 3 | Status: SHIPPED | OUTPATIENT
Start: 2018-12-13 | End: 2019-07-03 | Stop reason: SDUPTHER

## 2018-12-13 NOTE — PROGRESS NOTES
Lab Results   Component Value Date    .8 12/10/2018           Past Medical History:   Diagnosis Date    Hearing loss     Hyperthyroidism     Hypogonadism in male     Kidney stones     MAO (obstructive sleep apnea)     Type II or unspecified type diabetes mellitus without mention of complication, not stated as uncontrolled       Patient Active Problem List   Diagnosis    Diabetic polyneurop[facility] ([facility] [address]    Acquired hypothyroidism    Mixed hyperlipidemia    Primary male hypogonadism    Vitamin D deficiency    Type 2 diabetes, uncontrolled, with neuropathy ([facility] [address]    Essential hypertension    Obstructive sleep apnea syndrome    Class 2 obesity in adult     Past Surgical History:   Procedure Laterality Date    CHOLECYSTECTOMY      9/11    COLONOSCOPY      2009    THYROIDECTOMY      lt lobectomy 2/2010    TONSILLECTOMY       Social History     Social History    Marital status:       Spouse name: N/A    Number of children: N/A    Years of education: N/A     Occupational History    retired pediatrician      Social History Main Topics    Smoking status: Never Smoker    Smokeless tobacco: Never Used    Alcohol use 0.6 oz/week     1 Cans of beer per week      Comment: 4 of 7 days     Drug use: No    Sexual activity: No     Other Topics Concern    Not on file     Social History Narrative    No narrative on file     Family History   Problem Relation Age of Onset    Cancer Father         skin    Coronary Art Dis Father     Diabetes Father     Coronary Art Dis Paternal Uncle     Diabetes Paternal Uncle     Stroke Paternal Uncle     Heart Disease Neg Hx     High Blood Pressure Neg Hx     Osteoporosis Neg Hx     Thyroid Disease Neg Hx      Current Outpatient Prescriptions   Medication Sig Dispense Refill    insulin lispro (HUMALOG KWIKPEN) 200 UNIT/ML SOPN pen INJECT 30 UNITS BEFORE MEALS AS DIRECTED (PRESCRIBER OK IS NEEDED FOR NEXT FILL) 60 pen 3    insulin glargine (LANTUS medications    4. Mixed hyperlipidemia  Atorvastatin  - Lipid Panel; Future    5. Vitamin D deficiency  Increase to weekly Supplement  - Vitamin D 25 Hydroxy; Future    6. Obesity  Diet, exercise    Reviewed and/or ordered clinical lab results Yes  Reviewed and/or ordered radiology tests Yes  Reviewed and/or ordered other diagnostic tests No  Discussed test results with performing physician No  Independently reviewed image, tracing, or specimen No  Made a decision to obtain old records No  Reviewed old records Yes  Obtained history from other than patient No    Chris Camarillo was counseled regarding symptoms of diabetes diagnosis, course and complications of disease if inadequately treated, side effects of medications, diagnosis, treatment options, and prognosis, risks, benefits, complications, and alternatives of treatment, labs, imaging and other studies and treatment targets and goals, hypokalemia, insulin sensor. He understands instructions and counseling. Return in about 3 months (around 3/13/2019) for diabetes, thyroid problems.

## 2019-02-05 ENCOUNTER — OFFICE VISIT (OUTPATIENT)
Dept: PULMONOLOGY | Age: 69
End: 2019-02-05
Payer: MEDICARE

## 2019-02-05 VITALS
BODY MASS INDEX: 37.1 KG/M2 | WEIGHT: 265 LBS | SYSTOLIC BLOOD PRESSURE: 130 MMHG | HEIGHT: 71 IN | DIASTOLIC BLOOD PRESSURE: 74 MMHG | OXYGEN SATURATION: 99 % | HEART RATE: 75 BPM

## 2019-02-05 DIAGNOSIS — I10 ESSENTIAL HYPERTENSION: Chronic | ICD-10-CM

## 2019-02-05 DIAGNOSIS — G47.33 OBSTRUCTIVE SLEEP APNEA SYNDROME: Primary | Chronic | ICD-10-CM

## 2019-02-05 DIAGNOSIS — E66.01 CLASS 2 SEVERE OBESITY WITH SERIOUS COMORBIDITY AND BODY MASS INDEX (BMI) OF 36.0 TO 36.9 IN ADULT, UNSPECIFIED OBESITY TYPE (HCC): ICD-10-CM

## 2019-02-05 PROCEDURE — 99214 OFFICE O/P EST MOD 30 MIN: CPT | Performed by: NURSE PRACTITIONER

## 2019-02-05 ASSESSMENT — ENCOUNTER SYMPTOMS
RHINORRHEA: 0
EYE REDNESS: 0
COUGH: 0
EYE PAIN: 0
ABDOMINAL PAIN: 0
SHORTNESS OF BREATH: 0
SINUS PRESSURE: 0
APNEA: 0
ABDOMINAL DISTENTION: 0

## 2019-02-05 ASSESSMENT — SLEEP AND FATIGUE QUESTIONNAIRES
HOW LIKELY ARE YOU TO NOD OFF OR FALL ASLEEP WHILE WATCHING TV: 2
HOW LIKELY ARE YOU TO NOD OFF OR FALL ASLEEP WHILE SITTING QUIETLY AFTER LUNCH WITHOUT ALCOHOL: 1
HOW LIKELY ARE YOU TO NOD OFF OR FALL ASLEEP WHEN YOU ARE A PASSENGER IN A CAR FOR AN HOUR WITHOUT A BREAK: 2
HOW LIKELY ARE YOU TO NOD OFF OR FALL ASLEEP WHILE SITTING INACTIVE IN A PUBLIC PLACE: 1
HOW LIKELY ARE YOU TO NOD OFF OR FALL ASLEEP WHILE LYING DOWN TO REST IN THE AFTERNOON WHEN CIRCUMSTANCES PERMIT: 2
HOW LIKELY ARE YOU TO NOD OFF OR FALL ASLEEP WHILE SITTING AND TALKING TO SOMEONE: 0
HOW LIKELY ARE YOU TO NOD OFF OR FALL ASLEEP WHILE SITTING AND READING: 1
HOW LIKELY ARE YOU TO NOD OFF OR FALL ASLEEP IN A CAR, WHILE STOPPED FOR A FEW MINUTES IN TRAFFIC: 0
ESS TOTAL SCORE: 9

## 2019-03-27 ENCOUNTER — OFFICE VISIT (OUTPATIENT)
Dept: INTERNAL MEDICINE CLINIC | Age: 69
End: 2019-03-27
Payer: MEDICARE

## 2019-03-27 VITALS
HEART RATE: 66 BPM | HEIGHT: 71 IN | BODY MASS INDEX: 36.54 KG/M2 | WEIGHT: 261 LBS | SYSTOLIC BLOOD PRESSURE: 122 MMHG | DIASTOLIC BLOOD PRESSURE: 78 MMHG

## 2019-03-27 DIAGNOSIS — Z12.5 ENCOUNTER FOR SCREENING FOR MALIGNANT NEOPLASM OF PROSTATE: ICD-10-CM

## 2019-03-27 DIAGNOSIS — E78.2 MIXED HYPERLIPIDEMIA: ICD-10-CM

## 2019-03-27 DIAGNOSIS — I10 ESSENTIAL HYPERTENSION: Primary | ICD-10-CM

## 2019-03-27 DIAGNOSIS — E11.42 TYPE 2 DIABETES MELLITUS WITH DIABETIC POLYNEUROPATHY, WITH LONG-TERM CURRENT USE OF INSULIN (HCC): ICD-10-CM

## 2019-03-27 DIAGNOSIS — Z12.5 PROSTATE CANCER SCREENING: ICD-10-CM

## 2019-03-27 DIAGNOSIS — E29.1 PRIMARY MALE HYPOGONADISM: ICD-10-CM

## 2019-03-27 DIAGNOSIS — M10.9 GOUT, UNSPECIFIED CAUSE, UNSPECIFIED CHRONICITY, UNSPECIFIED SITE: ICD-10-CM

## 2019-03-27 DIAGNOSIS — Z79.4 TYPE 2 DIABETES MELLITUS WITH DIABETIC POLYNEUROPATHY, WITH LONG-TERM CURRENT USE OF INSULIN (HCC): ICD-10-CM

## 2019-03-27 PROCEDURE — 99214 OFFICE O/P EST MOD 30 MIN: CPT | Performed by: INTERNAL MEDICINE

## 2019-03-27 ASSESSMENT — ENCOUNTER SYMPTOMS
ABDOMINAL PAIN: 0
VOMITING: 0
PHOTOPHOBIA: 0
SHORTNESS OF BREATH: 0
NAUSEA: 0
WHEEZING: 0

## 2019-03-27 ASSESSMENT — PATIENT HEALTH QUESTIONNAIRE - PHQ9
1. LITTLE INTEREST OR PLEASURE IN DOING THINGS: 0
SUM OF ALL RESPONSES TO PHQ9 QUESTIONS 1 & 2: 0
SUM OF ALL RESPONSES TO PHQ QUESTIONS 1-9: 0
2. FEELING DOWN, DEPRESSED OR HOPELESS: 0
SUM OF ALL RESPONSES TO PHQ QUESTIONS 1-9: 0

## 2019-04-03 ENCOUNTER — OFFICE VISIT (OUTPATIENT)
Dept: ENDOCRINOLOGY | Age: 69
End: 2019-04-03
Payer: MEDICARE

## 2019-04-03 VITALS
HEIGHT: 71 IN | HEART RATE: 67 BPM | SYSTOLIC BLOOD PRESSURE: 120 MMHG | DIASTOLIC BLOOD PRESSURE: 65 MMHG | WEIGHT: 263.2 LBS | BODY MASS INDEX: 36.85 KG/M2

## 2019-04-03 DIAGNOSIS — E55.9 VITAMIN D DEFICIENCY: ICD-10-CM

## 2019-04-03 DIAGNOSIS — I10 ESSENTIAL HYPERTENSION: Chronic | ICD-10-CM

## 2019-04-03 DIAGNOSIS — E66.01 CLASS 2 SEVERE OBESITY WITH SERIOUS COMORBIDITY AND BODY MASS INDEX (BMI) OF 36.0 TO 36.9 IN ADULT, UNSPECIFIED OBESITY TYPE (HCC): ICD-10-CM

## 2019-04-03 DIAGNOSIS — E78.2 MIXED HYPERLIPIDEMIA: ICD-10-CM

## 2019-04-03 DIAGNOSIS — E29.1 PRIMARY MALE HYPOGONADISM: ICD-10-CM

## 2019-04-03 DIAGNOSIS — E03.9 ACQUIRED HYPOTHYROIDISM: ICD-10-CM

## 2019-04-03 DIAGNOSIS — M10.9 GOUT, UNSPECIFIED CAUSE, UNSPECIFIED CHRONICITY, UNSPECIFIED SITE: ICD-10-CM

## 2019-04-03 DIAGNOSIS — Z12.5 ENCOUNTER FOR SCREENING FOR MALIGNANT NEOPLASM OF PROSTATE: ICD-10-CM

## 2019-04-03 LAB
A/G RATIO: 1.6 (ref 1.1–2.2)
ALBUMIN SERPL-MCNC: 4.5 G/DL (ref 3.4–5)
ALP BLD-CCNC: 95 U/L (ref 40–129)
ALT SERPL-CCNC: 40 U/L (ref 10–40)
ANION GAP SERPL CALCULATED.3IONS-SCNC: 12 MMOL/L (ref 3–16)
AST SERPL-CCNC: 37 U/L (ref 15–37)
BILIRUB SERPL-MCNC: 0.4 MG/DL (ref 0–1)
BUN BLDV-MCNC: 16 MG/DL (ref 7–20)
CALCIUM SERPL-MCNC: 9.8 MG/DL (ref 8.3–10.6)
CHLORIDE BLD-SCNC: 101 MMOL/L (ref 99–110)
CHOLESTEROL, TOTAL: 150 MG/DL (ref 0–199)
CO2: 25 MMOL/L (ref 21–32)
CREAT SERPL-MCNC: 1 MG/DL (ref 0.8–1.3)
CREATININE URINE: 123.2 MG/DL (ref 39–259)
GFR AFRICAN AMERICAN: >60
GFR NON-AFRICAN AMERICAN: >60
GLOBULIN: 2.8 G/DL
GLUCOSE BLD-MCNC: 192 MG/DL (ref 70–99)
HCT VFR BLD CALC: 40.6 % (ref 40.5–52.5)
HDLC SERPL-MCNC: 28 MG/DL (ref 40–60)
HEMOGLOBIN: 13.4 G/DL (ref 13.5–17.5)
LDL CHOLESTEROL CALCULATED: ABNORMAL MG/DL
LDL CHOLESTEROL DIRECT: 79 MG/DL
MCH RBC QN AUTO: 29.2 PG (ref 26–34)
MCHC RBC AUTO-ENTMCNC: 33 G/DL (ref 31–36)
MCV RBC AUTO: 88.5 FL (ref 80–100)
MICROALBUMIN UR-MCNC: 4 MG/DL
MICROALBUMIN/CREAT UR-RTO: 32.5 MG/G (ref 0–30)
PDW BLD-RTO: 14.3 % (ref 12.4–15.4)
PLATELET # BLD: 307 K/UL (ref 135–450)
PMV BLD AUTO: 9.5 FL (ref 5–10.5)
POTASSIUM SERPL-SCNC: 4.9 MMOL/L (ref 3.5–5.1)
PROSTATE SPECIFIC ANTIGEN: 2.21 NG/ML (ref 0–4)
RBC # BLD: 4.59 M/UL (ref 4.2–5.9)
SODIUM BLD-SCNC: 138 MMOL/L (ref 136–145)
TOTAL PROTEIN: 7.3 G/DL (ref 6.4–8.2)
TRIGL SERPL-MCNC: 302 MG/DL (ref 0–150)
URIC ACID, SERUM: 4.6 MG/DL (ref 3.5–7.2)
VITAMIN D 25-HYDROXY: 44.4 NG/ML
VLDLC SERPL CALC-MCNC: ABNORMAL MG/DL
WBC # BLD: 8 K/UL (ref 4–11)

## 2019-04-03 PROCEDURE — 99214 OFFICE O/P EST MOD 30 MIN: CPT | Performed by: INTERNAL MEDICINE

## 2019-04-03 NOTE — PROGRESS NOTES
Juan Daniel Burger is a 70 y.o. male who presents for Type 2 diabetes mellitus. Current symptoms/problems include hyperglycemia and show no change. 1.  Type 2 diabetes, uncontrolled, with neuropathy (HCC) [E11.40, E11.65]    Diagnosed with Type 2 diabetes mellitus in 1994.     Comorbid conditions: hyperlipidemia, HTN and Neuropathy    Current diabetic medications include: Trulicity, Humalog, Lantus    Intolerance to diabetes medications: Metformin upset GI     Weight trend: stable  Prior visit with dietician: yes  Current diet: on average, 3 meals per day  Current exercise: frequent     Current monitoring regimen: home blood tests - 4 times daily  Has brought blood glucose log/meter:  Yes  Home blood sugar records: fasting range: 150-230 and postprandial range:  150-230  Any episodes of hypoglycemia? Yes  Hypoglycemia frequency and time(s):  1-2 times a year  Does patient have Glucagon emergency kit? No  Does patient have rapid acting carbohydrate? Yes  Does patient wear a medic alert bracelet or necklace? No    2. Acquired hypothyroidism  Has fatigue. 3. Essential hypertension  No headaches. 4. Mixed hyperlipidemia  No muscle pain. 5. Vitamin D deficiency  Has fatigue. 6. Obesity  On diet, exercise    THYROID ULTRASOUND-       INDICATION- Type 2 diabetes, history of hypothyroidism. Patient is   status post left thyroid lobectomy.       FINDINGS-        The right thyroid lobe appears normal, measuring 4.9 x 1.4 x 1.6   cm. Thyroid parenchyma is mildly heterogeneous but no focal lesion   is detected. The left thyroid lobe is not visualized.       IMPRESSION-        1.  Mild heterogeneity of the right thyroid lobe with no focal   lesion detected.       2. Status post left thyroid lobectomy.            Read By- Pb Grajeda        Released By- Pb Grajeda        Released Date Time- 02/08/10 1137            - Kristina Casarez         Lab Results   Component Value Date    LABA1C 9.6 12/10/2018     Lab Results   Component Value Date    .8 12/10/2018           Past Medical History:   Diagnosis Date    Hearing loss     Hyperthyroidism     Hypogonadism in male     Kidney stones     MAO (obstructive sleep apnea)     Type II or unspecified type diabetes mellitus without mention of complication, not stated as uncontrolled       Patient Active Problem List   Diagnosis    Diabetic polyneuropathy ([facility] [address]    Acquired hypothyroidism    Mixed hyperlipidemia    Primary male hypogonadism    Vitamin D deficiency    Type 2 diabetes, uncontrolled, with neuropathy ([facility] [address]    Essential hypertension    Obstructive sleep apnea syndrome    Class 2 severe obesity with serious comorbidity and body mass index (BMI) of 36.0 to 36.9 in adult [facility])     Past Surgical History:   Procedure Laterality Date    CHOLECYSTECTOMY      9/11    COLONOSCOPY      2009    THYROIDECTOMY      lt lobectomy 2/2010    TONSILLECTOMY       Social History     Socioeconomic History    Marital status:      Spouse name: Not on file    Number of children: Not on file    Years of education: Not on file    Highest education level: Not on file   Occupational History    Occupation: retired pediatrician   Social Needs    Financial resource strain: Not on file    Food insecurity:     Worry: Not on file     Inability: Not on file   Linkagoal needs:     Medical: Not on file     Non-medical: Not on file   Tobacco Use    Smoking status: Never Smoker    Smokeless tobacco: Never Used   Substance and Sexual Activity    Alcohol use:  Yes     Alcohol/week: 0.6 oz     Types: 1 Cans of beer per week     Comment: 4 of 7 days     Drug use: No    Sexual activity: Never   Lifestyle    Physical activity:     Days per week: Not on file     Minutes per session: Not on file    Stress: Not on file   Relationships    Social connections:     Talks on phone: Not on file     Gets together: Not on file     Attends Orthodox service: Not on file     Active member of club or organization: Not on file     Attends meetings of clubs or organizations: Not on file     Relationship status: Not on file    Intimate partner violence:     Fear of current or ex partner: Not on file     Emotionally abused: Not on file     Physically abused: Not on file     Forced sexual activity: Not on file   Other Topics Concern    Not on file   Social History Narrative    Not on file     Family History   Problem Relation Age of Onset    Cancer Father         skin    Coronary Art Dis Father     Diabetes Father     Coronary Art Dis Paternal Uncle     Diabetes Paternal Uncle     Stroke Paternal Uncle     Heart Disease Neg Hx     High Blood Pressure Neg Hx     Osteoporosis Neg Hx     Thyroid Disease Neg Hx      Current Outpatient Medications   Medication Sig Dispense Refill    insulin glargine (LANTUS SOLOSTAR) 100 UNIT/ML injection pen Inject 45 Units into the skin 2 times daily 10 pen 1    potassium citrate (UROCIT-K) 10 MEQ (1080 MG) extended release tablet Take 1 tablet by mouth daily 90 tablet 3    atorvastatin (LIPITOR) 10 MG tablet Take 1 tablet by mouth daily 90 tablet 3    Testosterone (ANDROGEL) 20.25 MG/ACT (1.62%) GEL gel APPLY 2 PUMPS TO SKIN ONCE DAILY AS DIRECTED **MAY REFILL** 75 g 5    amLODIPine (NORVASC) 10 MG tablet Take 1 tablet by mouth daily 90 tablet 3    allopurinol (ZYLOPRIM) 300 MG tablet Take 1 tablet by mouth daily 90 tablet 3    insulin lispro (HUMALOG KWIKPEN) 200 UNIT/ML SOPN pen INJECT 30 UNITS BEFORE MEALS AS DIRECTED (PRESCRIBER OK IS NEEDED FOR NEXT FILL) (Patient taking differently: Inject 40 Units into the skin Bid.  (to lessen missed dose_) 60 pen 3    Dulaglutide (TRULICITY) 1.5 TF/1.7RM SOPN INJECT 1.5MG AS DIRECTED ONCE EACH WEEK 15 pen 3    levothyroxine (SYNTHROID) 100 MCG tablet 1 TABLET BY MOUTH ONCE DAILY **MAY REFILL** 90 tablet 3    vitamin D (ERGOCALCIFEROL) 94765 units CAPS capsule 1 CAPSULE EVERY week 12 capsule 3    blood glucose test strips (ONE TOUCH ULTRA TEST) strip TEST 4 TIMES A  strip 0    B-D UF III MINI PEN NEEDLES 31G X 5 MM MISC USE 6 TIMES PER DAY OR AS DIRECTED FOR LANTUS - HUMALOG (PRESCRIBER OK IS NEEDED FOR NEXT FILL) 200 each 5    benazepril (LOTENSIN) 40 MG tablet 1 TABLET BY MOUTH ONCE DAY 90 tablet 3    omega-3 acid ethyl esters (LOVAZA) 1 g capsule Take 2 capsules by mouth 2 times daily (Patient taking differently: Take 2 g by mouth 2 times daily 1 capsules in the evening and 1 in the morning) 60 capsule 11    Turmeric 500 MG CAPS Take 1,000 mg by mouth daily      UNABLE TO FIND CPAP supplies, water reservoir and tubing. Dx 327.23 1 each 0    Blood Glucose Monitoring Suppl (ONE TOUCH ULTRA SYSTEM KIT) W/DEVICE KIT AS DIRECTED 1 kit 0    aspirin EC 81 MG EC tablet Take 1 tablet by mouth daily. 30 tablet 11    Multiple Vitamin (MULTIVITAMIN PO) Take  by mouth daily.  zoster recombinant adjuvanted vaccine (SHINGRIX) 50 MCG SUSR injection 50 MCG IM then repeat 2-6 months. 0.5 mL 1     No current facility-administered medications for this visit.       Allergies   Allergen Reactions    Demerol      vomiting    Metformin And Related      intolerant     Family Status   Relation Name Status    Father      Mother  Alive    PUnc  (Not Specified)    Neg Hx  (Not Specified)       Review of Systems  Constitutional: has fatigue, no fever, no recent weight gain, no recent weight loss, no changes in appetite  Eyes: no eye pain, no change in vision, no eye redness, no eye irritation, no double vision  Ears, nose, throat: no nasal congestion, no sore throat, no earache, no decrease in hearing, no hoarseness, no dry mouth, no sinus problems, no difficulty swallowing, no neck lumps, no dental problems, no mouth sores, no ringing in ears  Pulmonary: no shortness of breath, no wheezing, no dyspnea on exertion, no cough  Cardiovascular: no chest pain, no lower extremity edema, no orthopnea, no intermittent leg claudication, no palpitations  Gastrointestinal: no abdominal pain, no nausea, no vomiting, no diarrhea, no constipation, no dysphagia, no heartburn, no bloating  Genitourinary: no dysuria, no urinary incontinence, no urinary hesitancy, no urinary frequency, no feelings of urinary urgency, no nocturia  Musculoskeletal: no joint swelling, no joint stiffness, no joint pain, no muscle cramps, no muscle pain, no bone pain, no fractures  Integument/Breast:  no skin rashes, no skin lesions, no itching, no dry skin  Neurological: Has numbness, has tingling, no weakness, no confusion, no headaches, no dizziness, no fainting, no tremors, no decrease in memory, no balance problems  Psychiatric: no anxiety, no depression, no insomnia  Hematologic/Lymphatic: no tendency for easy bleeding, no swollen lymph nodes, no tendency for easy bruising  Immunology: has seasonal allergies, no frequent infections, no frequent illnesses  Endocrine: no temperature intolerance     OBJECTIVE:  /65 (Site: Left Upper Arm, Position: Sitting, Cuff Size: Large Adult)   Pulse 67   Ht 5' 11\" (1.803 m)   Wt 263 lb 3.2 oz (119.4 kg)   BMI 36.71 kg/m²      Constitutional: no acute distress, well appearing and well nourished  Psychiatric: oriented to person, place and time, judgement and insight and normal, recent and remote memory and intact and mood and affect are normal  Skin: skin and subcutaneous tissue is normal without mass, normal turgor  Head and Face: examination of head and face revealed no abnormalities  Eyes: no lid or conjunctival swelling, erythema or discharge, pupils are normal, equal, round, reactive to light  Ears/Nose: external inspection of ears and nose revealed no abnormalities, hearing is grossly normal  Oropharynx/Mouth/Face: lips, tongue and gums are normal with no lesions, the voice quality was normal  Neck: neck is supple and symmetric, with midline trachea and no masses, thyroid left lobe absent, right lobe normal  Lymphatics: normal cervical lymph nodes, normal supraclavicular nodes  Pulmonary: no increased work of breathing or signs of respiratory distress, lungs are clear to auscultation  Cardiovascular: normal heart rate and rhythm, normal S1 and S2, no murmurs and pedal pulses and 2+ bilaterally, No edema  Abdomen: abdomen is soft, non-tender with no masses  Musculoskeletal: normal gait and station and exam of the digits and nails are normal  Neurological: normal coordination and normal general cortical function    Visual inspection:  Deformity/amputation: absent  Skin lesions/pre-ulcerative calluses: absent  Edema: right- negative, left- negative    Sensory exam:  Monofilament sensation: abnormal  (minimum of 5 random plantar locations tested, avoiding callused areas - > 1 area with absence of sensation is + for neuropathy)  High risk feet     Plus at least one of the following:  Pulses: normal  Proprioception: Impaired  Vibration (128 Hz): Absent    ASSESSMENT/PLAN:  1. Type 2 diabetes, uncontrolled, with neuropathy ([facility]  Call with results. Add Jardiance daily. Consider Toujeo Max if OK with insurance. Need to follow diet better. Will do exercise. Patient states he is eating better. - Comprehensive Metabolic Panel; Future  - Hemoglobin A1C; Future  - Comprehensive Metabolic Panel; Future  - Hemoglobin A1C; Future    2. Acquired hypothyroidism  Levothyroxine 0.1 mg   - T4, Free; Future  - TSH without Reflex; Futuree    3. Essential hypertension  Same medications    4. Mixed hyperlipidemia  Atorvastatin  - Lipid Panel; Future    5. Vitamin D deficiency  Weekly Supplement  - Vitamin D 25 Hydroxy; Future    6.  Obesity  Diet, exercise    Reviewed and/or ordered clinical lab results Yes  Reviewed and/or ordered radiology tests Yes  Reviewed and/or ordered other diagnostic tests No  Discussed test results with performing physician No  Independently reviewed image, tracing, or specimen No  Made a decision to obtain old records No  Reviewed old records Yes  Obtained history from other than patient No    Phillip Le was counseled regarding symptoms of diabetes diagnosis, course and complications of disease if inadequately treated, side effects of medications, diagnosis, treatment options, and prognosis, risks, benefits, complications, and alternatives of treatment, labs, imaging and other studies and treatment targets and goals, hypokalemia, insulin sensor, insulin pump. He understands instructions and counseling. Return in about 3 months (around 7/3/2019) for diabetes, 40 min.

## 2019-04-04 ENCOUNTER — OFFICE VISIT (OUTPATIENT)
Dept: ENDOCRINOLOGY | Age: 69
End: 2019-04-04
Payer: MEDICARE

## 2019-04-04 ENCOUNTER — TELEPHONE (OUTPATIENT)
Dept: ENDOCRINOLOGY | Age: 69
End: 2019-04-04

## 2019-04-04 VITALS
DIASTOLIC BLOOD PRESSURE: 64 MMHG | BODY MASS INDEX: 36.82 KG/M2 | HEIGHT: 71 IN | WEIGHT: 263 LBS | HEART RATE: 63 BPM | OXYGEN SATURATION: 97 % | SYSTOLIC BLOOD PRESSURE: 122 MMHG

## 2019-04-04 DIAGNOSIS — I10 ESSENTIAL HYPERTENSION: Chronic | ICD-10-CM

## 2019-04-04 DIAGNOSIS — E78.2 MIXED HYPERLIPIDEMIA: ICD-10-CM

## 2019-04-04 DIAGNOSIS — E11.42 DIABETIC POLYNEUROPATHY ASSOCIATED WITH TYPE 2 DIABETES MELLITUS (HCC): ICD-10-CM

## 2019-04-04 DIAGNOSIS — E66.01 CLASS 2 SEVERE OBESITY WITH SERIOUS COMORBIDITY AND BODY MASS INDEX (BMI) OF 36.0 TO 36.9 IN ADULT, UNSPECIFIED OBESITY TYPE (HCC): ICD-10-CM

## 2019-04-04 DIAGNOSIS — E55.9 VITAMIN D DEFICIENCY: ICD-10-CM

## 2019-04-04 LAB
ESTIMATED AVERAGE GLUCOSE: 243.2 MG/DL
HBA1C MFR BLD: 10.1 %

## 2019-04-04 PROCEDURE — 99214 OFFICE O/P EST MOD 30 MIN: CPT | Performed by: NURSE PRACTITIONER

## 2019-04-04 PROCEDURE — 95250 CONT GLUC MNTR PHYS/QHP EQP: CPT | Performed by: NURSE PRACTITIONER

## 2019-04-04 NOTE — PROGRESS NOTES
Endocrinology  Harleen Krysta, [address] [facility]  Phone: [phone]   FAX: [phone]    Brandon Ro is a 70 y.o. male who is a new patient following up for management of Diabetes Mellitus Type 2. Last A1C:   Lab Results   Component Value Date    LABA1C 10.1 04/03/2019    LABA1C 9.6 12/10/2018    LABA1C 10.2 08/15/2018     Last BP Readings:  BP Readings from Last 3 Encounters:   04/04/19 122/64   04/03/19 120/65   03/27/19 122/78     Last LDL:   Lab Results   Component Value Date    LDLCALC see below 04/03/2019    LDLDIRECT 79 04/03/2019     Aspirin Use: 81 mg    Tobacco/Alcohol History:    Smoking status: Never Smoker    Smokeless tobacco: Never Used   Substance and Sexual Activity    Alcohol use: Yes     Alcohol/week: 0.6 oz     Types: 1 Cans of beer per week     Comment: 4 of 7 days     Drug use: No       Diabetes:  Brandon oR  has been diabetic for 20  years and on insulin for  8 years. Patient reports that diabetes is generally not well controlled. Disease course has been variable   Current microvascular complications include peripheral neuropathy, microalbuminuria  Current Macrovascular complications include HTN, HLD, no h/o previous CAD, PVD  Patient reports compliance for about 85% of the time and adheres to medication, but usually not to diet and exercise instructions. There have been no recent hospital or ED admissions for hypoglycemia, hyperglycemia or DKA. Other ED visit include for : none    PMH includes  Past Medical History:   Diagnosis Date    Hearing loss     Hyperthyroidism     Hypogonadism in male     Kidney stones     MAO (obstructive sleep apnea)     Type II or unspecified type diabetes mellitus without mention of complication, not stated as uncontrolled        Blood glucose trends:  Patient brought log glucometer for download : no, not aware he had to.   Readings per day  4 per patient report  Average reading mid 200s  Lowest in past 2 weeks 185  Highest in past 2 weeks 320  Hypoglycemia awareness and symptoms: none recently  Has not done CGM    Lab Results   Component Value Date    .2 04/03/2019    .8 12/10/2018    .0 08/15/2018       Current Medication regimen:   Lantus-> 45 units BID  Humalog--> 40 units AC TID , may occasionally do BID dosing  Trulicity 1.5  Jardiance 25 mg    Other meds tried in past: byetta, metformin  GFR > 60    Current Dietary regimen:   BF: orona, toast, coffee  Lunch: ham salad sandwich, chips, fruit  Dinner: steamed lean cuisine @ 7 pm  Snacks: night time snacks  Beverages: coffee. Iced tea, vitamin water  Average carbs per day  Usual carbs per meal    Microvascular Complications:  · Neuropathy: denies concerns  · Retinopathy: b/l cataracts  · Nephropathy:  Component      Latest Ref Rng & Units 4/3/2019   Microalbumin, Random Urine      <2.0 mg/dL 4.00 (H)   Creatinine, Ur      39.0 - 259.0 mg/dL 123.2   Microalbumin Creatinine Ratio      0.0 - 30.0 mg/g 32.5 (H)     Diabetic Health Maintenance   · Last Eye Exam: 01/2019  · Last Foot exam:   · Has patient seen a dietitian? Yes  · Current Exercise: No structured exercise  · On ACEI or ARB: lotensin 40 mg  · On statin: Lipitor 10 mg    Hyperlipidemia: Current complaints include occasional myalgias but otherwise tolerates well.   Lab Results   Component Value Date    CHOL 150 04/03/2019    CHOL 144 12/10/2018    CHOL 149 08/15/2018     Lab Results   Component Value Date    TRIG 302 04/03/2019    TRIG 296 12/10/2018    TRIG 255 08/15/2018     Lab Results   Component Value Date    HDL 28 04/03/2019    HDL 26 12/10/2018    HDL 28 08/15/2018    HDL 27 03/17/2012    HDL 27 07/06/2011    HDL 24 03/19/2011     Lab Results   Component Value Date    LDLCALC see below 04/03/2019    LDLCALC 59 12/10/2018    LDLCALC 70 08/15/2018     Lab Results   Component Value Date    LDLDIRECT 79 04/03/2019    LDLDIRECT 64 02/05/2018     No results found for: CHOLHDLRATIO    Vitamin D deficiency: Currently is status post left thyroid lobectomy.       FINDINGS-        The right thyroid lobe appears normal, measuring 4.9 x 1.4 x 1.6   cm. Thyroid parenchyma is mildly heterogeneous but no focal lesion   is detected. The left thyroid lobe is not visualized.       IMPRESSION-        1. Mild heterogeneity of the right thyroid lobe with no focal   lesion detected.       2. Status post left thyroid lobectomy.            Read By- Invesdorntha Alvarado        Released By- Comuto        Released Date Time- 02/08/10 1137            - NAYAN MORE           Family History   Problem Relation Age of Onset    Cancer Father         skin    Coronary Art Dis Father     Diabetes Father     Coronary Art Dis Paternal Uncle     Diabetes Paternal Uncle     Stroke Paternal Uncle     Heart Disease Neg Hx     High Blood Pressure Neg Hx     Osteoporosis Neg Hx     Thyroid Disease Neg Hx      Review of Systems   Constitutional: Positive for fatigue. Negative for activity change, appetite change, diaphoresis, fever and unexpected weight change. HENT: Negative for dental problem. Eyes: Negative for pain and visual disturbance. Respiratory: Negative for shortness of breath. Cardiovascular: Negative for chest pain, palpitations and leg swelling. Gastrointestinal: Negative for constipation, diarrhea and nausea. Endocrine: Negative for cold intolerance, heat intolerance, polydipsia, polyphagia and polyuria. Genitourinary: Positive for urgency. Negative for frequency. Musculoskeletal: Negative for arthralgias, joint swelling and myalgias. Skin: Negative for color change and pallor. Neurological: Negative for weakness, numbness and headaches. Psychiatric/Behavioral: Negative for dysphoric mood and sleep disturbance. The patient is not nervous/anxious.          Vitals:    04/04/19 1001   BP: 122/64   Site: Right Upper Arm   Position: Sitting   Cuff Size: Large Adult   Pulse: 63   SpO2: 97%   Weight: 263 lb (119.3 kg)   Height: 5' 11\" (1.803 m)     Physical Exam   Constitutional: He is oriented to person, place, and time. He appears well-developed and well-nourished. Eyes: Pupils are equal, round, and reactive to light. Neck: Normal range of motion. No thyromegaly present. Cardiovascular: Normal rate, regular rhythm and normal heart sounds. Pulmonary/Chest: Effort normal and breath sounds normal.   Abdominal: He exhibits no distension. Musculoskeletal: Normal range of motion. He exhibits no edema. Neurological: He is alert and oriented to person, place, and time. No sensory deficit. Skin: Skin is warm and dry. No skin changes or evidence of trauma. Psychiatric: He has a normal mood and affect. His behavior is normal. Thought content normal.   Vitals reviewed. Skeletal foot exam: No skin lesions are noted. Skin is normal.  Calluses are not noted. Toenails are normal. Pulses are +1 DP bilaterally. Skeletal structures are intact upon visual examination. Sensory: 10 g monofilament is 6/10 on the right and 6/10 on the left, 128 Hz vibration sense is absent bilaterally. Assessment  Jessee Diaz is a 70 y.o. male with uncontrolled Diabetes Mellitus type 2 complicated by peripheral neuropathy and microalbuminuria and associated with HTN, HLD, ED,     Plan  Problem List Items Addressed This Visit     Essential hypertension (Chronic)     Blood pressure controlled.  Continue current regimen         Diabetic polyneuropathy (HCC)    Mixed hyperlipidemia     LDL goal  < 100; recent at 78  TG elevated at 302  Continue current regimen  Managed by PCP           Type 2 diabetes, uncontrolled, with neuropathy ([facility] [address] - Primary     Lantus: titrate to morning fasting of 100-150; discussed titration method  Humalog: ensure compliance and to start maintaining premeal log  No change in the other medications    Freestyle sensor applied at visit : will review next visit    Diet  Target for 20 grams or less per meal.

## 2019-04-04 NOTE — PROGRESS NOTES
Freestyle Farhad sensor applied to pt's right arm while in the office. Lot #: [id number]  Exp date: 6/30/19    Pt advised to return to the office in 7-8 days to have sensor read and removed. He made 1 mo f/u apt with Amy Burns.

## 2019-04-12 ASSESSMENT — ENCOUNTER SYMPTOMS
DIARRHEA: 0
CONSTIPATION: 0
SHORTNESS OF BREATH: 0
EYE PAIN: 0
COLOR CHANGE: 0
NAUSEA: 0

## 2019-05-20 ENCOUNTER — TELEPHONE (OUTPATIENT)
Dept: PULMONOLOGY | Age: 69
End: 2019-05-20

## 2019-05-20 NOTE — TELEPHONE ENCOUNTER
Pt in asking for DOT documentation. Pt has an active modem at this time and was give a 6/month compliance download.

## 2019-05-23 ENCOUNTER — OFFICE VISIT (OUTPATIENT)
Dept: ENDOCRINOLOGY | Age: 69
End: 2019-05-23
Payer: MEDICARE

## 2019-05-23 VITALS
HEART RATE: 67 BPM | OXYGEN SATURATION: 98 % | BODY MASS INDEX: 36.74 KG/M2 | SYSTOLIC BLOOD PRESSURE: 110 MMHG | WEIGHT: 263.4 LBS | DIASTOLIC BLOOD PRESSURE: 60 MMHG

## 2019-05-23 DIAGNOSIS — E78.2 MIXED HYPERLIPIDEMIA: ICD-10-CM

## 2019-05-23 DIAGNOSIS — E03.9 ACQUIRED HYPOTHYROIDISM: ICD-10-CM

## 2019-05-23 DIAGNOSIS — E55.9 VITAMIN D DEFICIENCY: ICD-10-CM

## 2019-05-23 DIAGNOSIS — I10 ESSENTIAL HYPERTENSION: Chronic | ICD-10-CM

## 2019-05-23 DIAGNOSIS — E11.42 DIABETIC POLYNEUROPATHY ASSOCIATED WITH TYPE 2 DIABETES MELLITUS (HCC): ICD-10-CM

## 2019-05-23 DIAGNOSIS — E66.01 CLASS 2 SEVERE OBESITY WITH SERIOUS COMORBIDITY AND BODY MASS INDEX (BMI) OF 36.0 TO 36.9 IN ADULT, UNSPECIFIED OBESITY TYPE (HCC): ICD-10-CM

## 2019-05-23 PROCEDURE — 99213 OFFICE O/P EST LOW 20 MIN: CPT | Performed by: NURSE PRACTITIONER

## 2019-05-23 PROCEDURE — 95251 CONT GLUC MNTR ANALYSIS I&R: CPT | Performed by: NURSE PRACTITIONER

## 2019-05-23 RX ORDER — FLASH GLUCOSE SENSOR
1 KIT MISCELLANEOUS PRN
Qty: 1 DEVICE | Refills: 0 | Status: SHIPPED | OUTPATIENT
Start: 2019-05-23 | End: 2019-07-03 | Stop reason: SDUPTHER

## 2019-05-23 RX ORDER — FLASH GLUCOSE SENSOR
2 KIT MISCELLANEOUS PRN
Qty: 2 EACH | Refills: 5 | Status: SHIPPED | OUTPATIENT
Start: 2019-05-23 | End: 2019-07-03 | Stop reason: SDUPTHER

## 2019-05-23 ASSESSMENT — ENCOUNTER SYMPTOMS
COLOR CHANGE: 0
DIARRHEA: 0
SHORTNESS OF BREATH: 0
EYE PAIN: 0
CONSTIPATION: 0
NAUSEA: 0

## 2019-05-23 NOTE — PATIENT INSTRUCTIONS
Titration of your long acting insulin: lantus  Fixed dose is   Increase by 1 unit for every three days that fasting blood sugars are > 150  Decrease by 2 unit for any given day that fasting blood sugars are < 100  Enter in log book as discussed an bring it to the next visit    Meal time instructions: Humalog   Sliding scale for before meal insulin    < 80  Do not take any humalog   Take your fixed dose insulin as prescribed:30  151- 200 Add  2 units  201- 250 Add 4 units  251- 300 Add 6 units   301- 350 Add 8 units  351- 400 Add 10 units    Call the office if your sugars are less than 60 or more than 400.

## 2019-05-23 NOTE — PROGRESS NOTES
Endocrinology  Ryan Mora, [address] [facility]  Phone: [phone]   FAX: [phone]    Luiz Padilla is a 78 y.o. male who is following up for management of Diabetes Mellitus Type 2. Last A1C:   Lab Results   Component Value Date    LABA1C 10.1 04/03/2019    LABA1C 9.6 12/10/2018    LABA1C 10.2 08/15/2018     Last BP Readings:  BP Readings from Last 3 Encounters:   05/23/19 110/60   04/04/19 122/64   04/03/19 120/65     Last LDL:   Lab Results   Component Value Date    LDLCALC see below 04/03/2019    LDLDIRECT 79 04/03/2019     Aspirin Use: 81 mg    Tobacco/Alcohol History:    Smoking status: Never Smoker    Smokeless tobacco: Never Used   Substance and Sexual Activity    Alcohol use: Yes     Alcohol/week: 0.6 oz     Types: 1 Cans of beer per week     Comment: 4 of 7 days     Drug use: No       Diabetes:  Luiz Padilla  has been diabetic for 20  years and on insulin for  8 years. Patient reports that diabetes is generally not well controlled. Disease course has been variable   Current microvascular complications include peripheral neuropathy, microalbuminuria  Current Macrovascular complications include HTN, HLD, no h/o previous CAD, PVD  Patient reports compliance for about 85% of the time and adheres to medication, but usually not to diet and exercise instructions. There have been no recent hospital or ED admissions for hypoglycemia, hyperglycemia or DKA. Other ED visit include for : none    PMH includes  Past Medical History:   Diagnosis Date    Hearing loss     Hyperthyroidism     Hypogonadism in male     Kidney stones     MAO (obstructive sleep apnea)     Type II or unspecified type diabetes mellitus without mention of complication, not stated as uncontrolled        Blood glucose trends:  Patient brought log glucometer for download : no, not aware he had to.   Readings per day  4 per patient report  Average reading mid 200s--> high 100s  Lowest in past 2 weeks 185--> 125  Highest in past 2 weeks 320--> No results found for: JOSIE    Vitamin D deficiency: Currently is on 50 K IU weekly. Current complaints include fatigue on daily basis. Last vitamin Dlevel is:  Lab Results   Component Value Date    VITD25 44.4 04/03/2019    VITD25 39.7 12/10/2018    VITD25 44.6 08/15/2018       Hypertension   Controlled , denies symptoms of dizziness, light headedness. Occasional dependent edema. Tries to follow a salt restricted diet. Lab Results   Component Value Date     04/03/2019    K 4.9 04/03/2019     04/03/2019    CO2 25 04/03/2019    BUN 16 04/03/2019    CREATININE 1.0 04/03/2019    GLUCOSE 192 (H) 04/03/2019    CALCIUM 9.8 04/03/2019    PROT 7.3 04/03/2019    LABALBU 4.5 04/03/2019    BILITOT 0.4 04/03/2019    ALKPHOS 95 04/03/2019    AST 37 04/03/2019    ALT 40 04/03/2019    LABGLOM >60 04/03/2019    GFRAA >60 04/03/2019    AGRATIO 1.6 04/03/2019    GLOB 2.8 04/03/2019     The 10-year ASCVD risk score (Ky Alberto, et al., 2013) is: 32.2%    Values used to calculate the score:      Age: 78 years      Sex: Male      Is Non- : No      Diabetic: Yes      Tobacco smoker: No      Systolic Blood Pressure: 723 mmHg      Is BP treated: Yes      HDL Cholesterol: 28 mg/dL      Total Cholesterol: 150 mg/dL    Thyroid:   H/o hypothyroidism for years and has been on varying dosages of levothyroxine and is currently on levothyroxine 100mcg. Confirms that it is taken in early am on an empty stomach. Clarified that nothing to eat for at least 30 minutes after and no iron or calcium for at.least 3-4 hours after. Current symptoms include fatigue, weight gain. Patient denies feeling cold and cold intolerance, constipation, anxiousness, feeling excessive energy, tremulousness, palpitations.      Lab Results   Component Value Date    TSH 0.68 12/10/2018    TSH 1.22 08/15/2018    TSH 1.93 02/05/2018    T4FREE 1.4 12/10/2018    T4FREE 1.3 08/15/2018    T4FREE 1.2 02/05/2018     THYROID ULTRASOUND-       INDICATION- Type 2 diabetes, history of hypothyroidism. Patient is   status post left thyroid lobectomy.       FINDINGS-        The right thyroid lobe appears normal, measuring 4.9 x 1.4 x 1.6   cm. Thyroid parenchyma is mildly heterogeneous but no focal lesion   is detected. The left thyroid lobe is not visualized.       IMPRESSION-        1. Mild heterogeneity of the right thyroid lobe with no focal   lesion detected.       2. Status post left thyroid lobectomy.            Read By- Deedee Daigle        Released By- [employer]        Released Date Time- 02/08/10 1137            - NAYAN MORE           Family History   Problem Relation Age of Onset    Cancer Father         skin    Coronary Art Dis Father     Diabetes Father     Coronary Art Dis Paternal Uncle     Diabetes Paternal Uncle     Stroke Paternal Uncle     Heart Disease Neg Hx     High Blood Pressure Neg Hx     Osteoporosis Neg Hx     Thyroid Disease Neg Hx      Review of Systems   Constitutional: Positive for fatigue. Negative for activity change, appetite change, diaphoresis, fever and unexpected weight change. HENT: Negative for dental problem. Eyes: Negative for pain and visual disturbance. Respiratory: Negative for shortness of breath. Cardiovascular: Negative for chest pain, palpitations and leg swelling. Gastrointestinal: Negative for constipation, diarrhea and nausea. Endocrine: Negative for cold intolerance, heat intolerance, polydipsia, polyphagia and polyuria. Genitourinary: Positive for urgency. Negative for frequency. Musculoskeletal: Negative for arthralgias, joint swelling and myalgias. Skin: Negative for color change and pallor. Neurological: Negative for weakness, numbness and headaches. Psychiatric/Behavioral: Negative for dysphoric mood and sleep disturbance. The patient is not nervous/anxious.          Vitals:    05/23/19 1421   BP: 110/60   Pulse: 67 meal x 3/day; no carbs in snacks if possible  Ensure good quality protein and fats each meal  Ensure hydration and electrolyte replacement    Assessment  Yelitza Montero is a 78 y.o. male with uncontrolled Diabetes Mellitus type 2 complicated by peripheral neuropathy and microalbuminuria and associated with HTN, HLD, ED,     Plan  Problem List Items Addressed This Visit     Essential hypertension (Chronic)     Blood pressure controlled. Continue current regimen         Acquired hypothyroidism     TFT in range  Post left thyroid lobectomy  No focal lesions  On synthroid 100 mcg QD           Diabetic polyneuropathy (HCC)    Mixed hyperlipidemia     LDL goal  < 100; recent at 78  TG elevated at 302  Continue current regimen  Managed by PCP           Vitamin D deficiency    Type 2 diabetes, uncontrolled, with neuropathy ([facility] [address] - Primary     Lab Results   Component Value Date    LABA1C 10.1 04/03/2019     Lab Results   Component Value Date    .2 04/03/2019     1. Worsening of A1c since previous  2. Patient did not implement dietary changes or insulin titration discussed at previous visit: tried for about 2 weeks, then went on a cruise and \" fell off the wagon\" and has not tried since. Appears upset about it  3. Reviewed sensor download and interpretation: tried to convince patient to try again with low carb diet as an important aspect of definitive DM2 management  4. Resolved to try a 30 grams carb with equal amount of protein and some good fats at each meal: importance of a low carb breakfast  5. Repeated titration of lantus and humalog in detail: did not remember from previous visit and had remained on fixed doses  6. He is interested in personal Freestyle arielle to improve tracking and therefore accountability: medicare patient so Rx sent to [facility], when he receives them he will return for a nurse/MA visit for training in its use  7.  Completed CDL paperwork per Dr Jaxon Jade: scanned in media           Relevant Medications    Continuous Blood Gluc Sensor (FREESTYLE ALBA 14 DAY SENSOR) MISC    Continuous Blood Gluc  (FREESTYLE ALBA READER) DELMIS    Other Relevant Orders    MD CONTINUOUS GLUCOSE MONITORING ANALYSIS I&R (Completed)    Class 2 severe obesity with serious comorbidity and body mass index (BMI) of 36.0 to 36.9 in [facility])     Reviewed carbohyrate and caloric restriction  Emphasis on former at this time  Ensure good hydration and adequate electrolyte replacement  Patient states he is relatively non compliant with diet: management fatigue             Greater than 40 minutes spent directly counseling patient about topics listed above (such as lifestyle modifications, preventative screenings and/or disease related processes.    Follow up with Dr Jennifer Salazar MD on 7/3/2019

## 2019-05-24 NOTE — ASSESSMENT & PLAN NOTE
Lab Results   Component Value Date    LABA1C 10.1 04/03/2019     Lab Results   Component Value Date    .2 04/03/2019     1. Worsening of A1c since previous  2. Patient did not implement dietary changes or insulin titration discussed at previous visit: tried for about 2 weeks, then went on a cruise and \" fell off the wagon\" and has not tried since. Appears upset about it  3. Reviewed sensor download and interpretation: tried to convince patient to try again with low carb diet as an important aspect of definitive DM2 management  4. Resolved to try a 30 grams carb with equal amount of protein and some good fats at each meal: importance of a low carb breakfast  5. Repeated titration of lantus and humalog in detail: did not remember from previous visit and had remained on fixed doses  6. He is interested in personal Freestyle arielle to improve tracking and therefore accountability: medicare patient so Rx sent to [facility], when he receives them he will return for a nurse/MA visit for training in its use  7.  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

## 2019-05-24 NOTE — ASSESSMENT & PLAN NOTE
Reviewed carbohyrate and caloric restriction  Emphasis on former at this time  Ensure good hydration and adequate electrolyte replacement  Patient states he is relatively non compliant with diet: management fatigue

## 2019-06-03 ENCOUNTER — TELEPHONE (OUTPATIENT)
Dept: RHEUMATOLOGY | Age: 69
End: 2019-06-03

## 2019-06-05 ENCOUNTER — TELEPHONE (OUTPATIENT)
Dept: ENDOCRINOLOGY | Age: 69
End: 2019-06-05

## 2019-07-01 ENCOUNTER — HOSPITAL ENCOUNTER (OUTPATIENT)
Age: 69
Discharge: HOME | End: 2019-07-01
Payer: MEDICARE

## 2019-07-01 DIAGNOSIS — E03.9 ACQUIRED HYPOTHYROIDISM: ICD-10-CM

## 2019-07-01 DIAGNOSIS — E55.9 VITAMIN D DEFICIENCY: ICD-10-CM

## 2019-07-01 LAB
A/G RATIO: 1.3 (ref 1.1–2.2)
ALBUMIN SERPL-MCNC: 4.3 G/DL (ref 3.4–5)
ALP BLD-CCNC: 88 U/L (ref 40–129)
ALT SERPL-CCNC: 28 U/L (ref 10–40)
ANION GAP SERPL CALCULATED.3IONS-SCNC: 12 MMOL/L (ref 3–16)
AST SERPL-CCNC: 23 U/L (ref 15–37)
BILIRUB SERPL-MCNC: 0.4 MG/DL (ref 0–1)
BUN BLDV-MCNC: 20 MG/DL (ref 7–20)
CALCIUM SERPL-MCNC: 9.7 MG/DL (ref 8.3–10.6)
CHLORIDE BLD-SCNC: 103 MMOL/L (ref 99–110)
CHOLESTEROL, TOTAL: 125 MG/DL (ref 0–199)
CO2: 24 MMOL/L (ref 21–32)
CREAT SERPL-MCNC: 1.3 MG/DL (ref 0.8–1.3)
CREATININE URINE: 98.2 MG/DL (ref 39–259)
ESTIMATED AVERAGE GLUCOSE: 177.2 MG/DL
GFR AFRICAN AMERICAN: >60
GFR NON-AFRICAN AMERICAN: 55
GLOBULIN: 3.3 G/DL
GLUCOSE BLD-MCNC: 130 MG/DL (ref 70–99)
HBA1C MFR BLD: 7.8 %
HDLC SERPL-MCNC: 26 MG/DL (ref 40–60)
LDL CHOLESTEROL CALCULATED: 42 MG/DL
MICROALBUMIN UR-MCNC: <1.2 MG/DL
MICROALBUMIN/CREAT UR-RTO: NORMAL MG/G (ref 0–30)
POTASSIUM SERPL-SCNC: 4.4 MMOL/L (ref 3.5–5.1)
SODIUM BLD-SCNC: 139 MMOL/L (ref 136–145)
T4 FREE: 1.4 NG/DL (ref 0.9–1.8)
TOTAL PROTEIN: 7.6 G/DL (ref 6.4–8.2)
TRIGL SERPL-MCNC: 284 MG/DL (ref 0–150)
TSH SERPL DL<=0.05 MIU/L-ACNC: 0.48 UIU/ML (ref 0.27–4.2)
VITAMIN D 25-HYDROXY: 49.6 NG/ML
VLDLC SERPL CALC-MCNC: 57 MG/DL

## 2019-07-01 PROCEDURE — 84439 ASSAY OF FREE THYROXINE: CPT

## 2019-07-01 PROCEDURE — 80061 LIPID PANEL: CPT

## 2019-07-01 PROCEDURE — 36415 COLL VENOUS BLD VENIPUNCTURE: CPT

## 2019-07-01 PROCEDURE — 80053 COMPREHEN METABOLIC PANEL: CPT

## 2019-07-01 PROCEDURE — 82570 ASSAY OF URINE CREATININE: CPT

## 2019-07-01 PROCEDURE — 82306 VITAMIN D 25 HYDROXY: CPT

## 2019-07-01 PROCEDURE — 84443 ASSAY THYROID STIM HORMONE: CPT

## 2019-07-01 PROCEDURE — 82043 UR ALBUMIN QUANTITATIVE: CPT

## 2019-07-01 PROCEDURE — 83036 HEMOGLOBIN GLYCOSYLATED A1C: CPT

## 2019-07-03 ENCOUNTER — OFFICE VISIT (OUTPATIENT)
Dept: ENDOCRINOLOGY | Age: 69
End: 2019-07-03
Payer: MEDICARE

## 2019-07-03 VITALS
OXYGEN SATURATION: 98 % | SYSTOLIC BLOOD PRESSURE: 98 MMHG | WEIGHT: 260.2 LBS | BODY MASS INDEX: 36.43 KG/M2 | DIASTOLIC BLOOD PRESSURE: 57 MMHG | HEART RATE: 67 BPM | HEIGHT: 71 IN

## 2019-07-03 DIAGNOSIS — E03.9 ACQUIRED HYPOTHYROIDISM: ICD-10-CM

## 2019-07-03 DIAGNOSIS — E11.8 TYPE 2 DIABETES MELLITUS WITH COMPLICATION, WITH LONG-TERM CURRENT USE OF INSULIN (HCC): ICD-10-CM

## 2019-07-03 DIAGNOSIS — E11.42 TYPE 2 DIABETES MELLITUS WITH DIABETIC POLYNEUROPATHY, WITH LONG-TERM CURRENT USE OF INSULIN (HCC): Chronic | ICD-10-CM

## 2019-07-03 DIAGNOSIS — E29.1 TESTOSTERONE DEFICIENCY IN MALE: ICD-10-CM

## 2019-07-03 DIAGNOSIS — E78.2 MIXED HYPERLIPIDEMIA: ICD-10-CM

## 2019-07-03 DIAGNOSIS — E66.01 CLASS 2 SEVERE OBESITY WITH SERIOUS COMORBIDITY AND BODY MASS INDEX (BMI) OF 36.0 TO 36.9 IN ADULT, UNSPECIFIED OBESITY TYPE (HCC): ICD-10-CM

## 2019-07-03 DIAGNOSIS — E55.9 VITAMIN D DEFICIENCY: ICD-10-CM

## 2019-07-03 DIAGNOSIS — Z79.4 TYPE 2 DIABETES MELLITUS WITH DIABETIC POLYNEUROPATHY, WITH LONG-TERM CURRENT USE OF INSULIN (HCC): Chronic | ICD-10-CM

## 2019-07-03 DIAGNOSIS — I10 ESSENTIAL HYPERTENSION: Chronic | ICD-10-CM

## 2019-07-03 DIAGNOSIS — Z79.4 TYPE 2 DIABETES MELLITUS WITH COMPLICATION, WITH LONG-TERM CURRENT USE OF INSULIN (HCC): ICD-10-CM

## 2019-07-03 PROCEDURE — 99214 OFFICE O/P EST MOD 30 MIN: CPT | Performed by: INTERNAL MEDICINE

## 2019-07-03 RX ORDER — TESTOSTERONE 16.2 MG/G
GEL TRANSDERMAL
Qty: 75 G | Refills: 5 | Status: CANCELLED | OUTPATIENT
Start: 2019-07-03

## 2019-07-03 RX ORDER — ATORVASTATIN CALCIUM 10 MG/1
10 TABLET, FILM COATED ORAL DAILY
Qty: 90 TABLET | Refills: 1 | Status: SHIPPED | OUTPATIENT
Start: 2019-07-03 | End: 2020-03-11 | Stop reason: SDUPTHER

## 2019-07-03 RX ORDER — FLASH GLUCOSE SENSOR
1 KIT MISCELLANEOUS PRN
Qty: 1 DEVICE | Refills: 0 | Status: SHIPPED | OUTPATIENT
Start: 2019-07-03

## 2019-07-03 RX ORDER — FLASH GLUCOSE SENSOR
2 KIT MISCELLANEOUS PRN
Qty: 2 EACH | Refills: 5 | Status: SHIPPED | OUTPATIENT
Start: 2019-07-03 | End: 2019-12-23

## 2019-07-03 RX ORDER — ERGOCALCIFEROL 1.25 MG/1
CAPSULE ORAL
Qty: 12 CAPSULE | Refills: 1 | Status: SHIPPED | OUTPATIENT
Start: 2019-07-03 | End: 2019-11-18 | Stop reason: SDUPTHER

## 2019-07-03 RX ORDER — LEVOTHYROXINE SODIUM 0.1 MG/1
TABLET ORAL
Qty: 90 TABLET | Refills: 3 | Status: SHIPPED | OUTPATIENT
Start: 2019-07-03 | End: 2019-12-30

## 2019-07-03 NOTE — PROGRESS NOTES
no hoarseness, no dry mouth, no sinus problems, no difficulty swallowing, no neck lumps, no dental problems, no mouth sores, no ringing in ears  Pulmonary: no shortness of breath, no wheezing, no dyspnea on exertion, no cough  Cardiovascular: no chest pain, no lower extremity edema, no orthopnea, no intermittent leg claudication, no palpitations  Gastrointestinal: no abdominal pain, no nausea, no vomiting, no diarrhea, no constipation, no dysphagia, no heartburn, no bloating  Genitourinary: no dysuria, no urinary incontinence, no urinary hesitancy, no urinary frequency, no feelings of urinary urgency, no nocturia  Musculoskeletal: no joint swelling, no joint stiffness, no joint pain, no muscle cramps, no muscle pain, no bone pain, no fractures  Integument/Breast:  no skin rashes, no skin lesions, no itching, no dry skin  Neurological: Has numbness, has tingling, no weakness, no confusion, no headaches, no dizziness, no fainting, no tremors, no decrease in memory, no balance problems  Psychiatric: no anxiety, no depression, no insomnia  Hematologic/Lymphatic: no tendency for easy bleeding, no swollen lymph nodes, no tendency for easy bruising  Immunology: has seasonal allergies, no frequent infections, no frequent illnesses  Endocrine: no temperature intolerance     OBJECTIVE:  BP (!) 98/57 (Site: Left Upper Arm, Position: Sitting, Cuff Size: Medium Adult)   Pulse 67   Ht 5' 11\" (1.803 m)   Wt 260 lb 3.2 oz (118 kg)   SpO2 98%   BMI 36.29 kg/m²      Wt Readings from Last 3 Encounters:   07/03/19 260 lb 3.2 oz (118 kg)   05/23/19 263 lb 6.4 oz (119.5 kg)   04/04/19 263 lb (119.3 kg)       Constitutional: no acute distress, well appearing and well nourished  Psychiatric: oriented to person, place and time, judgement and insight and normal, recent and remote memory and intact and mood and affect are normal  Skin: skin and subcutaneous tissue is normal without mass, normal turgor  Head and Face: examination of head Future    5. Vitamin D deficiency  Weekly Supplement  - Vitamin D 25 Hydroxy; Future    6. Obesity  Diet, exercise    Reviewed and/or ordered clinical lab results Yes  Reviewed and/or ordered radiology tests Yes  Reviewed and/or ordered other diagnostic tests No  Discussed test results with performing physician No  Independently reviewed image, tracing, or specimen No  Made a decision to obtain old records No  Reviewed old records Yes  Obtained history from other than patient No    Jean Pierre Galdamez was counseled regarding symptoms of diabetes diagnosis, course and complications of disease if inadequately treated, side effects of medications, diagnosis, treatment options, and prognosis, risks, benefits, complications, and alternatives of treatment, labs, imaging and other studies and treatment targets and goals, hypokalemia, insulin sensor, insulin pump. He understands instructions and counseling. Return in about 4 months (around 11/3/2019) for diabetes.

## 2019-07-11 ENCOUNTER — OFFICE VISIT (OUTPATIENT)
Dept: INTERNAL MEDICINE CLINIC | Age: 69
End: 2019-07-11
Payer: MEDICARE

## 2019-07-11 VITALS
SYSTOLIC BLOOD PRESSURE: 124 MMHG | DIASTOLIC BLOOD PRESSURE: 65 MMHG | WEIGHT: 260 LBS | HEIGHT: 71 IN | OXYGEN SATURATION: 98 % | BODY MASS INDEX: 36.4 KG/M2 | HEART RATE: 69 BPM

## 2019-07-11 DIAGNOSIS — Z01.818 PRE-OP EVALUATION: Primary | ICD-10-CM

## 2019-07-11 DIAGNOSIS — H25.89 OTHER AGE-RELATED CATARACT OF BOTH EYES: ICD-10-CM

## 2019-07-11 PROCEDURE — 99214 OFFICE O/P EST MOD 30 MIN: CPT | Performed by: NURSE PRACTITIONER

## 2019-07-11 ASSESSMENT — ENCOUNTER SYMPTOMS
EYE REDNESS: 0
DIARRHEA: 0
ABDOMINAL PAIN: 0
SHORTNESS OF BREATH: 0
COUGH: 0
EYE PAIN: 0
PHOTOPHOBIA: 0
WHEEZING: 0
SORE THROAT: 0
VOMITING: 0
BACK PAIN: 0
CONSTIPATION: 0
BLOOD IN STOOL: 0
SINUS PAIN: 0
EYE ITCHING: 0
EYE DISCHARGE: 0
NAUSEA: 0

## 2019-07-11 NOTE — PROGRESS NOTES
uncontrolled, with neuropathy ([facility] [address]    Essential hypertension    Obstructive sleep apnea syndrome    Class 2 severe obesity with serious comorbidity and body mass index (BMI) of 36.0 to 36.9 in adult [facility])     Past Medical History:   Diagnosis Date    Hearing loss     Hyperthyroidism     Hypogonadism in male     Kidney stones     MAO (obstructive sleep apnea)     Type II or unspecified type diabetes mellitus without mention of complication, not stated as uncontrolled      Past Surgical History:   Procedure Laterality Date    CHOLECYSTECTOMY      9/11    COLONOSCOPY      2009    THYROIDECTOMY      lt lobectomy 2/2010    TONSILLECTOMY       Family History   Problem Relation Age of Onset    Cancer Father         skin    Coronary Art Dis Father     Diabetes Father     Coronary Art Dis Paternal Uncle     Diabetes Paternal Uncle     Stroke Paternal Uncle     Heart Disease Neg Hx     High Blood Pressure Neg Hx     Osteoporosis Neg Hx     Thyroid Disease Neg Hx      Review of Systems  Review of Systems   Constitutional: Negative for chills, fatigue, fever and unexpected weight change. HENT: Negative for congestion, postnasal drip, sinus pain, sore throat and tinnitus. Eyes: Positive for visual disturbance. Negative for photophobia, pain, discharge, redness and itching. Cataracts  Floaters   Respiratory: Negative for cough, shortness of breath and wheezing. Cardiovascular: Negative for chest pain, palpitations and leg swelling. Gastrointestinal: Negative for abdominal pain, blood in stool, constipation, diarrhea, nausea and vomiting. Genitourinary: Negative for dysuria, frequency, hematuria and urgency. Musculoskeletal: Negative for back pain, gait problem, myalgias and neck pain. Skin: Negative for pallor and rash. Neurological: Negative for dizziness, weakness, light-headedness, numbness and headaches.    Psychiatric/Behavioral: Negative for behavioral problems, confusion, sleep disturbance and suicidal ideas. The patient is not nervous/anxious. Physical Exam   Vitals:    07/11/19 1406   BP: 124/65   Pulse: 69   SpO2: 98%   Weight: 260 lb (117.9 kg)   Height: 5' 11\" (1.803 m)   Constitutional: He is oriented to person, place, and time. He appears well-developed and well-nourished. No distress. HENT:   Head: Normocephalic and atraumatic. Mouth/Throat: Uvula is midline, oropharynx is clear and moist and mucous membranes are normal.   Eyes: Conjunctivae and EOM are normal.   Neck: Trachea normal and normal range of motion. Neck supple. Cardiovascular: Normal rate, regular rhythm, normal heart sounds and intact distal pulses. Pulmonary/Chest: Effort normal and breath sounds normal. No respiratory distress. He has no wheezes. He has no rales. Abdominal: Soft, non-tender. Bowel sounds are normal.   Musculoskeletal: He exhibits no edema and no tenderness. Neurological: He is alert and oriented to person, place, and time. Skin: Skin is warm and dry. No rash noted. No erythema. Psychiatric: He has a normal mood and affect. His behavior is normal.     EKG Interpretation:  EKG Interpretation: N/A  Lab Review:  N/A     Assessment:     78 y.o. patient with planned surgery as above. Known risk factors for perioperative complications: Diabetes mellitus, Obesity, Hypertension, Obstructive sleep apnea-wears CPAP     Plan:     1. Preoperative workup as follows: N/A  2. Change in medication regimen before surgery: None per surgery team  3. Deep vein thrombosis prophylaxis: regimen to be chosen by surgical team  4. No contraindications to planned surgery- surgeon to be aware of risk factors for perioperative complications    All questions answered. Patient states no further questions or concerns at this time.   EMILY Arreola - [facility] 07/11/19  [facility]  [phone]

## 2019-08-07 ENCOUNTER — TELEPHONE (OUTPATIENT)
Dept: INTERNAL MEDICINE CLINIC | Age: 69
End: 2019-08-07

## 2019-08-26 ENCOUNTER — TELEPHONE (OUTPATIENT)
Dept: INTERNAL MEDICINE CLINIC | Age: 69
End: 2019-08-26

## 2019-10-16 ENCOUNTER — OFFICE VISIT (OUTPATIENT)
Dept: INTERNAL MEDICINE CLINIC | Age: 69
End: 2019-10-16
Payer: MEDICARE

## 2019-10-16 VITALS
HEART RATE: 72 BPM | DIASTOLIC BLOOD PRESSURE: 72 MMHG | WEIGHT: 260.2 LBS | SYSTOLIC BLOOD PRESSURE: 122 MMHG | HEIGHT: 71 IN | BODY MASS INDEX: 36.43 KG/M2

## 2019-10-16 DIAGNOSIS — E29.1 PRIMARY MALE HYPOGONADISM: ICD-10-CM

## 2019-10-16 DIAGNOSIS — E11.8 TYPE 2 DIABETES MELLITUS WITH COMPLICATION, WITH LONG-TERM CURRENT USE OF INSULIN (HCC): ICD-10-CM

## 2019-10-16 DIAGNOSIS — Z23 NEED FOR TDAP VACCINATION: ICD-10-CM

## 2019-10-16 DIAGNOSIS — Z23 NEED FOR INFLUENZA VACCINATION: ICD-10-CM

## 2019-10-16 DIAGNOSIS — Z79.4 TYPE 2 DIABETES MELLITUS WITH COMPLICATION, WITH LONG-TERM CURRENT USE OF INSULIN (HCC): ICD-10-CM

## 2019-10-16 DIAGNOSIS — I10 ESSENTIAL HYPERTENSION: Primary | ICD-10-CM

## 2019-10-16 PROCEDURE — G0008 ADMIN INFLUENZA VIRUS VAC: HCPCS | Performed by: INTERNAL MEDICINE

## 2019-10-16 PROCEDURE — 90653 IIV ADJUVANT VACCINE IM: CPT | Performed by: INTERNAL MEDICINE

## 2019-10-16 PROCEDURE — 99214 OFFICE O/P EST MOD 30 MIN: CPT | Performed by: INTERNAL MEDICINE

## 2019-10-16 ASSESSMENT — ENCOUNTER SYMPTOMS
SHORTNESS OF BREATH: 0
NAUSEA: 0
WHEEZING: 0
VOMITING: 0
PHOTOPHOBIA: 0
CHEST TIGHTNESS: 0
ABDOMINAL PAIN: 0

## 2019-11-04 ENCOUNTER — HOSPITAL ENCOUNTER (OUTPATIENT)
Age: 69
Discharge: HOME | End: 2019-11-04
Payer: MEDICARE

## 2019-11-04 DIAGNOSIS — E29.1 PRIMARY MALE HYPOGONADISM: ICD-10-CM

## 2019-11-04 DIAGNOSIS — E55.9 VITAMIN D DEFICIENCY: ICD-10-CM

## 2019-11-04 LAB
A/G RATIO: 1.9 (ref 1.1–2.2)
ALBUMIN SERPL-MCNC: 4.9 G/DL (ref 3.4–5)
ALP BLD-CCNC: 93 U/L (ref 40–129)
ALT SERPL-CCNC: 36 U/L (ref 10–40)
ANION GAP SERPL CALCULATED.3IONS-SCNC: 17 MMOL/L (ref 3–16)
AST SERPL-CCNC: 27 U/L (ref 15–37)
BILIRUB SERPL-MCNC: 0.4 MG/DL (ref 0–1)
BUN BLDV-MCNC: 28 MG/DL (ref 7–20)
CALCIUM SERPL-MCNC: 9.6 MG/DL (ref 8.3–10.6)
CHLORIDE BLD-SCNC: 103 MMOL/L (ref 99–110)
CHOLESTEROL, TOTAL: 162 MG/DL (ref 0–199)
CO2: 22 MMOL/L (ref 21–32)
CREAT SERPL-MCNC: 1 MG/DL (ref 0.8–1.3)
GFR AFRICAN AMERICAN: >60
GFR NON-AFRICAN AMERICAN: >60
GLOBULIN: 2.6 G/DL
GLUCOSE BLD-MCNC: 130 MG/DL (ref 70–99)
HDLC SERPL-MCNC: 28 MG/DL (ref 40–60)
LDL CHOLESTEROL CALCULATED: ABNORMAL MG/DL
LDL CHOLESTEROL DIRECT: 84 MG/DL
POTASSIUM SERPL-SCNC: 4.9 MMOL/L (ref 3.5–5.1)
SODIUM BLD-SCNC: 142 MMOL/L (ref 136–145)
T4 FREE: 1.3 NG/DL (ref 0.9–1.8)
TOTAL PROTEIN: 7.5 G/DL (ref 6.4–8.2)
TRIGL SERPL-MCNC: 332 MG/DL (ref 0–150)
TSH SERPL DL<=0.05 MIU/L-ACNC: 0.68 UIU/ML (ref 0.27–4.2)
VITAMIN D 25-HYDROXY: 45 NG/ML
VLDLC SERPL CALC-MCNC: ABNORMAL MG/DL

## 2019-11-04 PROCEDURE — 83036 HEMOGLOBIN GLYCOSYLATED A1C: CPT

## 2019-11-04 PROCEDURE — 80053 COMPREHEN METABOLIC PANEL: CPT

## 2019-11-04 PROCEDURE — 80061 LIPID PANEL: CPT

## 2019-11-04 PROCEDURE — 84439 ASSAY OF FREE THYROXINE: CPT

## 2019-11-04 PROCEDURE — 84270 ASSAY OF SEX HORMONE GLOBUL: CPT

## 2019-11-04 PROCEDURE — 36415 COLL VENOUS BLD VENIPUNCTURE: CPT

## 2019-11-04 PROCEDURE — 84443 ASSAY THYROID STIM HORMONE: CPT

## 2019-11-04 PROCEDURE — 82306 VITAMIN D 25 HYDROXY: CPT

## 2019-11-04 PROCEDURE — 84403 ASSAY OF TOTAL TESTOSTERONE: CPT

## 2019-11-05 LAB
ESTIMATED AVERAGE GLUCOSE: 197.3 MG/DL
HBA1C MFR BLD: 8.5 %

## 2019-11-06 ENCOUNTER — OFFICE VISIT (OUTPATIENT)
Dept: ENDOCRINOLOGY | Age: 69
End: 2019-11-06
Payer: MEDICARE

## 2019-11-06 VITALS
OXYGEN SATURATION: 98 % | BODY MASS INDEX: 36.82 KG/M2 | DIASTOLIC BLOOD PRESSURE: 59 MMHG | WEIGHT: 263 LBS | SYSTOLIC BLOOD PRESSURE: 105 MMHG | HEIGHT: 71 IN | HEART RATE: 67 BPM

## 2019-11-06 DIAGNOSIS — E78.2 MIXED HYPERLIPIDEMIA: ICD-10-CM

## 2019-11-06 DIAGNOSIS — E03.9 ACQUIRED HYPOTHYROIDISM: ICD-10-CM

## 2019-11-06 DIAGNOSIS — I10 ESSENTIAL HYPERTENSION: Chronic | ICD-10-CM

## 2019-11-06 DIAGNOSIS — E66.01 CLASS 2 SEVERE OBESITY WITH SERIOUS COMORBIDITY AND BODY MASS INDEX (BMI) OF 36.0 TO 36.9 IN ADULT, UNSPECIFIED OBESITY TYPE (HCC): ICD-10-CM

## 2019-11-06 DIAGNOSIS — E55.9 VITAMIN D DEFICIENCY: ICD-10-CM

## 2019-11-06 LAB
SEX HORMONE BINDING GLOBULIN: 27 NMOL/L (ref 11–80)
TESTOSTERONE FREE-NONMALE: 166.5 PG/ML (ref 47–244)
TESTOSTERONE TOTAL: 675 NG/DL (ref 220–1000)

## 2019-11-06 PROCEDURE — 99214 OFFICE O/P EST MOD 30 MIN: CPT | Performed by: INTERNAL MEDICINE

## 2019-11-06 RX ORDER — TESTOSTERONE 16.2 MG/G
GEL TRANSDERMAL
Qty: 75 G | Refills: 2 | Status: CANCELLED | OUTPATIENT
Start: 2019-11-06 | End: 2020-11-06

## 2019-11-18 DIAGNOSIS — E55.9 VITAMIN D DEFICIENCY: ICD-10-CM

## 2019-11-18 RX ORDER — ERGOCALCIFEROL 1.25 MG/1
CAPSULE ORAL
Qty: 12 CAPSULE | Refills: 3 | Status: SHIPPED | OUTPATIENT
Start: 2019-11-18 | End: 2020-03-11 | Stop reason: SDUPTHER

## 2019-12-23 RX ORDER — FLASH GLUCOSE SENSOR
KIT MISCELLANEOUS
Qty: 2 EACH | Refills: 5 | Status: SHIPPED | OUTPATIENT
Start: 2019-12-23 | End: 2020-03-11 | Stop reason: SDUPTHER

## 2019-12-30 DIAGNOSIS — E03.9 ACQUIRED HYPOTHYROIDISM: ICD-10-CM

## 2019-12-30 RX ORDER — LEVOTHYROXINE SODIUM 0.1 MG/1
TABLET ORAL
Qty: 90 TABLET | Refills: 1 | Status: SHIPPED | OUTPATIENT
Start: 2019-12-30 | End: 2020-03-11 | Stop reason: SDUPTHER

## 2020-02-05 RX ORDER — EMPAGLIFLOZIN 25 MG/1
TABLET, FILM COATED ORAL
Qty: 30 TABLET | Refills: 3 | Status: SHIPPED | OUTPATIENT
Start: 2020-02-05 | End: 2020-06-11

## 2020-02-10 ENCOUNTER — OFFICE VISIT (OUTPATIENT)
Dept: PULMONOLOGY | Age: 70
End: 2020-02-10
Payer: MEDICARE

## 2020-02-10 VITALS
HEIGHT: 71 IN | HEART RATE: 61 BPM | WEIGHT: 266 LBS | SYSTOLIC BLOOD PRESSURE: 112 MMHG | DIASTOLIC BLOOD PRESSURE: 66 MMHG | OXYGEN SATURATION: 98 % | BODY MASS INDEX: 37.24 KG/M2

## 2020-02-10 PROCEDURE — 99214 OFFICE O/P EST MOD 30 MIN: CPT | Performed by: NURSE PRACTITIONER

## 2020-02-10 ASSESSMENT — ENCOUNTER SYMPTOMS
ABDOMINAL DISTENTION: 0
ABDOMINAL PAIN: 0
COUGH: 0
SINUS PRESSURE: 0
EYE REDNESS: 0
RHINORRHEA: 0
SHORTNESS OF BREATH: 0
EYE PAIN: 0
APNEA: 0

## 2020-02-10 ASSESSMENT — SLEEP AND FATIGUE QUESTIONNAIRES
HOW LIKELY ARE YOU TO NOD OFF OR FALL ASLEEP WHEN YOU ARE A PASSENGER IN A CAR FOR AN HOUR WITHOUT A BREAK: 0
HOW LIKELY ARE YOU TO NOD OFF OR FALL ASLEEP WHILE SITTING QUIETLY AFTER LUNCH WITHOUT ALCOHOL: 2
HOW LIKELY ARE YOU TO NOD OFF OR FALL ASLEEP WHILE SITTING AND TALKING TO SOMEONE: 1
ESS TOTAL SCORE: 10
HOW LIKELY ARE YOU TO NOD OFF OR FALL ASLEEP WHILE SITTING AND READING: 2
HOW LIKELY ARE YOU TO NOD OFF OR FALL ASLEEP WHILE SITTING INACTIVE IN A PUBLIC PLACE: 1
HOW LIKELY ARE YOU TO NOD OFF OR FALL ASLEEP WHILE WATCHING TV: 2
HOW LIKELY ARE YOU TO NOD OFF OR FALL ASLEEP IN A CAR, WHILE STOPPED FOR A FEW MINUTES IN TRAFFIC: 0
HOW LIKELY ARE YOU TO NOD OFF OR FALL ASLEEP WHILE LYING DOWN TO REST IN THE AFTERNOON WHEN CIRCUMSTANCES PERMIT: 2

## 2020-02-10 NOTE — PROGRESS NOTES
Vilma Dhillon MD, FAASM, [facility]  Shereen Menendez, MSN, RN, CNP     [address] [place] [address] SLEEP MEDICINE  [address] [address]  Dept: [phone]  Dept Fax: : Hedy Velasquez [facility] SLEEP MEDICINE  [address] [place] [id number] [phone]    Subjective:     Patient ID: Michaela Lopez is a 78 y.o. male. Chief Complaint   Patient presents with    Sleep Apnea       HPI:      Machine Present today:  Yes    Machine Modem/Download Info:  Compliance (hours/night): 4 hrs/night  Download AHI (/hour): 1.3 /HR  Average CPAP Pressure : 10.1 cmH2O           APAP - Settings  Pressure Min: 7 cmH2O  Pressure Max: 17 cmH2O                 Comfort Settings  Humidity Level (0-8): 3  Flex/EPR (0-3): 3 PAP Mask  Mask Type: Full Face mask     [place] - Total score: 10    Follow-up :     Last Visit : February 2019      Patient reports the listed chronic Co-morbidities: DM, HTN, Obesity    are well controlled and stable at this time. Subjective Health Changes: None     Over Night Oximetry: [] Yes  [] No  [x] NA [] WNL   Using O2: [] Yes  [] No  [x] NA   Patient is compliant with the machine  [] Yes  [x] No Stating he has a travel machine as well which we are unable to access at this time   Feeling rested when using the machine   [x] Yes  [] No     Pressure is comfortable with inspiration and expiration  [x] Yes  [] No     Noticed changes in pressure   [] Yes  [] No  [x] NA   Mask is fitting well  [x] Yes  [] No   Noting Mask Air Leak  [] Yes  [x] No   Having painful Aerophagia  [] Yes  [x] No   Nocturia   0  per night.    Having  HA upon waking  [] Yes  [x] No   Dry mouth upon waking   Dry Nose  Dry Eyes  [] Yes  [x] No   Congestion upon waking   [] Yes  [x] No    Nose Bleeds  [] Yes  [x] No   Using Sleep Aides    [x] NA   Understands how to change humidification and/or tubing temperature for Worry: Not on file     Inability: Not on file    Transportation needs:     Medical: Not on file     Non-medical: Not on file   Tobacco Use    Smoking status: Never Smoker    Smokeless tobacco: Never Used   Substance and Sexual Activity    Alcohol use: Yes     Alcohol/week: 1.0 standard drinks     Types: 1 Cans of beer per week     Comment: 4 of 7 days     Drug use: No    Sexual activity: Never   Lifestyle    Physical activity:     Days per week: Not on file     Minutes per session: Not on file    Stress: Not on file   Relationships    Social connections:     Talks on phone: Not on file     Gets together: Not on file     Attends Hoahaoism service: Not on file     Active member of club or organization: Not on file     Attends meetings of clubs or organizations: Not on file     Relationship status: Not on file    Intimate partner violence:     Fear of current or ex partner: Not on file     Emotionally abused: Not on file     Physically abused: Not on file     Forced sexual activity: Not on file   Other Topics Concern    Not on file   Social History Narrative    Not on file       Prior to Admission medications    Medication Sig Start Date End Date Taking?  Authorizing Provider   JARDIANCE 25 MG tablet 1 TABLET BY MOUTH ONCE DAILY (PRESCRIBER OK IS NEEDED FOR NEXT FILL) 2/5/20  Yes Bel Barahona MD   allopurinol (ZYLOPRIM) 300 MG tablet TAKE 1 TABLET ONCE DAILY * (PRESCRIBER OK IS NEEDED FOR NEXT FILL) 1/28/20  Yes Gary Schaefer MD   amLODIPine (NORVASC) 10 MG tablet 1 TABLET BY MOUTH ONCE DAILY (Rodolph Balk) (PRESCRIBER OK IS NEEDED FOR NEXT FILL) 1/28/20  Yes SIMÓN Ruffin MD   levothyroxine (SYNTHROID) 100 MCG tablet 1 TABLET BY MOUTH ONCE DAILY (PRESCRIBER OK NEEDED FOR NEXT REFILL) 12/30/19  Yes Bel Barahona MD   Continuous Blood Gluc Sensor (FREESTYLE ALBA 14 DAY SENSOR) MISC USE 1 SENSOR EVERY 14 DAYS (PRESCRIBER OK IS NEEDED FOR NEXT FILL) 12/23/19  Yes Bel Barahona MD   vitamin D (ERGOCALCIFEROL) 1.25 MG (68715 UT) CAPS capsule 1 CAPSULE EVERY WEEK **MAY REFILL** 11/18/19  Yes SIMÓN Waggoner MD   insulin glargine (LANTUS SOLOSTAR) 100 UNIT/ML injection pen Inject 50 Units into the skin 2 times daily 11/6/19  Yes Talha Buckner MD   insulin lispro (HUMALOG KWIKPEN) 200 UNIT/ML SOPN pen INJECT 25 UNITS BEFORE MEALS plus correction 2 units for every 50 of BG above 150 qac 11/6/19  Yes Talha Buckner MD   Insulin Pen Needle (B-D UF III MINI PEN NEEDLES) 31G X 5 MM MISC USE 6 TIMES PER DAY OR AS DIRECTED FOR LANTUS - HUMALOG **MAY REFILL** 10/29/19  Yes M Jovany Mejia MD   Testosterone (ANDROGEL) 20.25 MG/ACT (1.62%) GEL gel APPLY 2 PUMPS TO SKIN ONCE DAILY AS DIRECTED **MAY REFILL** 10/23/19 4/20/20 Yes M Kim Waggoner MD   benazepril (LOTENSIN) 40 MG tablet 1 TABLET BY MOUTH ONCE DAY (PRESCRIBER OK NEEDED FOR NEXT REFILL) 9/3/19  Yes John Alvarez MD   blood glucose test strips (ONE TOUCH ULTRA TEST) strip qid 7/3/19  Yes Tahla Buckner MD   Dulaglutide (TRULICITY) 1.5 TW/0.5OM SOPN INJECT 1.5MG AS DIRECTED ONCE EACH WEEK 7/3/19  Yes Talha Buckner MD   atorvastatin (LIPITOR) 10 MG tablet Take 1 tablet by mouth daily 7/3/19  Yes Talha Buckner MD   Continuous Blood Gluc  (FREESTYLE ALBA READER) DELMIS 1 Units by Does not apply route as needed (as needed) 7/3/19  Yes Talha Buckner MD   potassium citrate (UROCIT-K) 10 MEQ (1080 MG) extended release tablet Take 1 tablet by mouth daily 2/25/19  Yes John Alvarez MD   omega-3 acid ethyl esters (LOVAZA) 1 g capsule Take 2 capsules by mouth 2 times daily  Patient taking differently: Take 2 g by mouth 2 times daily 1 capsules in the evening and 1 in the morning 5/3/18  Yes Talha Buckner MD   Turmeric 500 MG CAPS Take 1,000 mg by mouth daily   Yes Historical Provider, MD   UNABLE TO FIND CPAP supplies, water reservoir and tubing.  Dx 263.65 2/4/15  Yes Donald Hager MD   Blood Glucose Monitoring Suppl (ONE TOUCH ULTRA SYSTEM KIT) W/DEVICE KIT AS DIRECTED 11/14/13  Yes Dioni Lee MD   aspirin EC 81 MG EC tablet Take 1 tablet by mouth daily. 7/5/11  Yes Ramsey Sweeney MD   Multiple Vitamin (MULTIVITAMIN PO) Take  by mouth daily. 1/8/10  Yes Historical Provider, MD       Allergies as of 02/10/2020 - Review Complete 02/10/2020   Allergen Reaction Noted    Demerol  01/08/2010    Metformin and related  03/27/2019       Patient Active Problem List   Diagnosis    Di[facility] polyneuropathy [facility]    Acquired hypothyroidism    Mixed hyperlipidemia    Primary male hypogonadism    Vitamin D deficiency    Type 2 diabetes, uncontrolled, with neuropathy ([facility]    Essential hypertension    Obstructive sleep apnea syndrome    Class 2 severe obesity with serious comorbidity and body mass index (BMI) of 36.0 to 36.9 in adult [facility])       Past Medical History:   Diagnosis Date    Hearing loss     Hypogonadism in male     Hypothyroidism     Kidney stones     MAO (obstructive sleep apnea)     Type II or unspecified type diabetes mellitus without mention of complication, not stated as uncontrolled        Past Surgical History:   Procedure Laterality Date    CATARACT REMOVAL WITH IMPLANT Bilateral     CHOLECYSTECTOMY      9/11    COLONOSCOPY      2009    THYROIDECTOMY      lt lobectomy 2/2010    TONSILLECTOMY         Family History   Problem Relation Age of Onset    Cancer Father         skin    Coronary Art Dis Father     Diabetes Father     Coronary Art Dis Paternal Uncle     Diabetes Paternal Uncle     Stroke Paternal Uncle     Heart Disease Neg Hx     High Blood Pressure Neg Hx     Osteoporosis Neg Hx     Thyroid Disease Neg Hx     Heart Attack Neg Hx        Vitals:  Weight BMI   Wt Readings from Last 3 Encounters:   02/10/20 266 lb (120.7 kg)   11/06/19 263 lb (119.3 kg)   10/16/19 260 lb 3.2 oz (118 kg)    Body mass index is 37.1 kg/m².      BP HR SaO2   BP Readings from Last 3 Encounters:   02/10/20 112/66   11/06/19 (!) 105/59   10/16/19 122/72    Pulse Readings from Last 3 Encounters:   02/10/20 61   11/06/19 67   10/16/19 72    SpO2 Readings from Last 3 Encounters:   02/10/20 98%   11/06/19 98%   07/11/19 98%        Assessment/Plan:     Type 2 diabetes, uncontrolled, with neuropathy (HCC)  Chronic- Stable. Cont meds per PCP and other physicians. Essential hypertension  Chronic- Stable. Cont meds per PCP and other physicians. Class 2 severe obesity with serious comorbidity and body mass index (BMI) of 36.0 to 36.9 in adult (HCC)  Chronic-Stable. Encouraged him to work on weight loss through diet and exercise. Obstructive sleep apnea syndrome  Reviewed compliance download with pt. Supplies and parts as needed for his machine. These are medically necessary. Continue medications per his PCP and other physicians. Limit caffeine use after 3pm.  Encouraged him to work on weight loss through diet and exercise. Diagnoses of Type 2 diabetes, uncontrolled, with neuropathy ([address] Essential hypertension, and Class 2 severe obesity with serious comorbidity and body mass index (BMI) of 36.0 to 36.9 in adult, unspecified obesity type [facility]) were pertinent to this visit. The chronic medical conditions listed are directly related to the primary diagnosis listed above. The management of the primary diagnosis affects the secondary diagnosis and vice versa. The primary encounter diagnosis was Obstructive sleep apnea syndrome. Diagnoses of Type 2 diabetes, uncontrolled, with neuropathy ([address] Essential hypertension, and Class 2 severe obesity with serious comorbidity and body mass index (BMI) of 36.0 to 36.9 in adult, unspecified obesity type [facility]) were also pertinent to this visit. The chronic medical conditions listed are directly related to the primary diagnosis listed above. The management of the primary diagnosis affects the secondary diagnosis and vice versa.      - Educated patient and reviewed down load from modem with the patient   - Continue medications per his PCP and other physicians.   - he instructed not to drive unless had 4 hrs of effective therapy for his MAO the night before. - Did review the risks of under or untreated MAO including, but not limited to, higher risks of motor vehicle accidents, stroke, heart attacks, and death. - he understands and accepts all these risks. - The patient is advised to use the machine every night.   - Patient using  Other [employer] for supplies  - No longer has a DOT and is using a travel CPAP when out of town unable to use that for evaluation today   -F/U: 12 months    No orders of the defined types were placed in this encounter. No orders of the defined types were placed in this encounter. No orders of the defined types were placed in this encounter.       Anna Vilchis, MSN, RN, CNP

## 2020-02-10 NOTE — PROGRESS NOTES
Deisi Vargas         : 1950    Diagnosis: [x] MAO (G47.33) [] CSA (G47.31) [] Apnea (G47.30)   Length of Need: [x] 12 Months [] 99 Months [] Other:    Machine (LAINE!): [x] Respironics Dream Station      Auto [] ResMed AirSense     Auto [] Other:     []  CPAP () [] Bilevel ()   Mode: [] Auto [] Spontaneous    Mode: [] Auto [] Spontaneous                            Comfort Settings:   - Ramp Pressure:  cmH2O                                        - Ramp time: 15 min                                     -  Flex/EPR - 3 full time                                    - For ResMed Bilevel (TiMax-4 sec   TiMin- 0.2 sec)     Humidifier: [x] Heated ()        [x] Water chamber replacement ()/ 1 per 6 months        Mask:   [] Nasal () /1 per 3 months [x] Full Face () /1 per 3 months   [] Patient choice -Size and fit mask [x] Patient Choice - Size and fit mask   [] Dispense:  [] Dispense:    [] Headgear () / 1 per 3 months [x] Headgear () / 1 per 3 months   [] Replacement Nasal Cushion ()/2 per month [x] Interface Replacement ()/1 per month   [] Replacement Nasal Pillows ()/2 per month         Tubing: [x] Heated ()/1 per 3 months    [] Standard ()/1 per 3 months [] Other:           Filters: [x] Non-disposable ()/1 per 6 months     [x] Ultra-Fine, Disposable ()/2 per month        Miscellaneous: [] Chin Strap ()/ 1 per 6 months [] O2 bleed-in:       LPM   [] Oximetry on CPAP/Bilevel []  Other:          Start Order Date: 02/10/20    MEDICAL JUSTIFICATION:  I, the undersigned, certify that the above prescribed supplies are medically necessary for this patients wellbeing. In my opinion, the supplies are both reasonable and necessary in reference to accepted standards of medicalpractice in treatment of this patients condition.     EMILY Sargent - CNP      NPI: [id number]       Order Signed Date: 02/10/20    Electronically signed by

## 2020-02-10 NOTE — LETTER
[facility] Sleep Medicine  [address] [address]  Phone: [phone]  Fax: [phone]    February 10, 2020       Patient: William Stafford   MR Number: [id number]   YOB: 1950   Date of Visit: 2/10/2020       Neisha Valenzuela was seen for a follow up visit today. Here is my assessment and plan as well as an attached copy of his visit today:    Type 2 diabetes, uncontrolled, with neuropathy ([address]  Chronic- Stable. Cont meds per PCP and other physicians. Essential hypertension  Chronic- Stable. Cont meds per PCP and other physicians. Class 2 severe obesity with serious comorbidity and body mass index (BMI) of 36.0 to 36.9 in adult ([facility])  Chronic-Stable. Encouraged him to work on weight loss through diet and exercise. Obstructive sleep apnea syndrome  Reviewed compliance download with pt. Supplies and parts as needed for his machine. These are medically necessary. Continue medications per his PCP and other physicians. Limit caffeine use after 3pm.  Encouraged him to work on weight loss through diet and exercise. Diagnoses of Type 2 diabetes, uncontrolled, with neuropathy ([address] Essential hypertension, and Class 2 severe obesity with serious comorbidity and body mass index (BMI) of 36.0 to 36.9 in adult, unspecified obesity type [facility]) were pertinent to this visit. The chronic medical conditions listed are directly related to the primary diagnosis listed above. The management of the primary diagnosis affects the secondary diagnosis and vice versa. If you have questions or concerns, please do not hesitate to call me. I look forward to following Leonard Mcnamara along with you.     Sincerely,    Ashlie Boss, APRN - CNP    CC providers:  Jayla Fitzgerald MD  [address] [facility]  VIA In Basket

## 2020-03-04 ENCOUNTER — HOSPITAL ENCOUNTER (OUTPATIENT)
Age: 70
Discharge: HOME | End: 2020-03-04
Payer: MEDICARE

## 2020-03-04 LAB
A/G RATIO: 1 (ref 1.1–2.2)
ALBUMIN SERPL-MCNC: 3.9 G/DL (ref 3.4–5)
ALP BLD-CCNC: 104 U/L (ref 40–129)
ALT SERPL-CCNC: 29 U/L (ref 10–40)
ANION GAP SERPL CALCULATED.3IONS-SCNC: 17 MMOL/L (ref 3–16)
AST SERPL-CCNC: 26 U/L (ref 15–37)
BILIRUB SERPL-MCNC: 0.5 MG/DL (ref 0–1)
BUN BLDV-MCNC: 33 MG/DL (ref 7–20)
CALCIUM SERPL-MCNC: 9.6 MG/DL (ref 8.3–10.6)
CHLORIDE BLD-SCNC: 99 MMOL/L (ref 99–110)
CHOLESTEROL, TOTAL: 115 MG/DL (ref 0–199)
CO2: 23 MMOL/L (ref 21–32)
CREAT SERPL-MCNC: 1.5 MG/DL (ref 0.8–1.3)
CREATININE URINE: 135.4 MG/DL (ref 39–259)
ESTIMATED AVERAGE GLUCOSE: 188.6 MG/DL
GFR AFRICAN AMERICAN: 56
GFR NON-AFRICAN AMERICAN: 46
GLOBULIN: 3.9 G/DL
GLUCOSE BLD-MCNC: 82 MG/DL (ref 70–99)
HBA1C MFR BLD: 8.2 %
HDLC SERPL-MCNC: 31 MG/DL (ref 40–60)
LDL CHOLESTEROL CALCULATED: 50 MG/DL
MICROALBUMIN UR-MCNC: 2.4 MG/DL
MICROALBUMIN/CREAT UR-RTO: 17.7 MG/G (ref 0–30)
POTASSIUM SERPL-SCNC: 4 MMOL/L (ref 3.5–5.1)
SODIUM BLD-SCNC: 139 MMOL/L (ref 136–145)
T4 FREE: 1.3 NG/DL (ref 0.9–1.8)
TOTAL PROTEIN: 7.8 G/DL (ref 6.4–8.2)
TRIGL SERPL-MCNC: 168 MG/DL (ref 0–150)
TSH SERPL DL<=0.05 MIU/L-ACNC: 1.15 UIU/ML (ref 0.27–4.2)
VITAMIN D 25-HYDROXY: 52.9 NG/ML
VLDLC SERPL CALC-MCNC: 34 MG/DL

## 2020-03-04 PROCEDURE — 83036 HEMOGLOBIN GLYCOSYLATED A1C: CPT

## 2020-03-04 PROCEDURE — 80053 COMPREHEN METABOLIC PANEL: CPT

## 2020-03-04 PROCEDURE — 84443 ASSAY THYROID STIM HORMONE: CPT

## 2020-03-04 PROCEDURE — 36415 COLL VENOUS BLD VENIPUNCTURE: CPT

## 2020-03-04 PROCEDURE — 82570 ASSAY OF URINE CREATININE: CPT

## 2020-03-04 PROCEDURE — 82043 UR ALBUMIN QUANTITATIVE: CPT

## 2020-03-04 PROCEDURE — 82306 VITAMIN D 25 HYDROXY: CPT

## 2020-03-04 PROCEDURE — 80061 LIPID PANEL: CPT

## 2020-03-04 PROCEDURE — 84439 ASSAY OF FREE THYROXINE: CPT

## 2020-03-09 RX ORDER — POTASSIUM CITRATE 10 MEQ/1
TABLET, EXTENDED RELEASE ORAL
Qty: 90 TABLET | Refills: 2 | Status: SHIPPED | OUTPATIENT
Start: 2020-03-09 | End: 2021-01-04 | Stop reason: SDUPTHER

## 2020-03-11 ENCOUNTER — OFFICE VISIT (OUTPATIENT)
Dept: ENDOCRINOLOGY | Age: 70
End: 2020-03-11
Payer: MEDICARE

## 2020-03-11 VITALS
BODY MASS INDEX: 36.29 KG/M2 | WEIGHT: 259.2 LBS | HEIGHT: 71 IN | OXYGEN SATURATION: 98 % | SYSTOLIC BLOOD PRESSURE: 130 MMHG | HEART RATE: 85 BPM | DIASTOLIC BLOOD PRESSURE: 63 MMHG

## 2020-03-11 PROCEDURE — 3052F HG A1C>EQUAL 8.0%<EQUAL 9.0%: CPT | Performed by: INTERNAL MEDICINE

## 2020-03-11 PROCEDURE — 99214 OFFICE O/P EST MOD 30 MIN: CPT | Performed by: INTERNAL MEDICINE

## 2020-03-11 RX ORDER — INSULIN GLARGINE 100 [IU]/ML
50 INJECTION, SOLUTION SUBCUTANEOUS 2 TIMES DAILY
Qty: 20 PEN | Refills: 3 | Status: SHIPPED | OUTPATIENT
Start: 2020-03-11 | End: 2020-12-31

## 2020-03-11 RX ORDER — INSULIN LISPRO 200 [IU]/ML
INJECTION, SOLUTION SUBCUTANEOUS
Qty: 20 PEN | Refills: 3 | Status: SHIPPED | OUTPATIENT
Start: 2020-03-11 | End: 2021-01-04 | Stop reason: SDUPTHER

## 2020-03-11 RX ORDER — ATORVASTATIN CALCIUM 10 MG/1
10 TABLET, FILM COATED ORAL DAILY
Qty: 90 TABLET | Refills: 1 | Status: SHIPPED | OUTPATIENT
Start: 2020-03-11 | End: 2021-01-04

## 2020-03-11 RX ORDER — ERGOCALCIFEROL 1.25 MG/1
CAPSULE ORAL
Qty: 12 CAPSULE | Refills: 3 | Status: SHIPPED | OUTPATIENT
Start: 2020-03-11 | End: 2021-04-08 | Stop reason: SDUPTHER

## 2020-03-11 RX ORDER — PEN NEEDLE, DIABETIC 31 GX5/16"
NEEDLE, DISPOSABLE MISCELLANEOUS
Qty: 200 EACH | Refills: 5 | Status: SHIPPED | OUTPATIENT
Start: 2020-03-11 | End: 2020-10-08

## 2020-03-11 RX ORDER — OMEGA-3-ACID ETHYL ESTERS 1 G/1
2 CAPSULE, LIQUID FILLED ORAL 2 TIMES DAILY
Qty: 360 CAPSULE | Refills: 1 | Status: SHIPPED | OUTPATIENT
Start: 2020-03-11 | End: 2021-01-04 | Stop reason: SDUPTHER

## 2020-03-11 RX ORDER — FLASH GLUCOSE SENSOR
KIT MISCELLANEOUS
Qty: 2 EACH | Refills: 5 | Status: SHIPPED | OUTPATIENT
Start: 2020-03-11 | End: 2020-09-29

## 2020-03-11 RX ORDER — LEVOTHYROXINE SODIUM 0.1 MG/1
TABLET ORAL
Qty: 90 TABLET | Refills: 1 | Status: SHIPPED | OUTPATIENT
Start: 2020-03-11 | End: 2020-07-24

## 2020-03-11 NOTE — PROGRESS NOTES
Pérez Mcintosh is a 78 y.o. male who presents for Type 2 diabetes mellitus. Current symptoms/problems include hyperglycemia and show no change. 1.  Type 2 diabetes, uncontrolled, with neuropathy (HCC) [E11.40, E11.65]    Diagnosed with Type 2 diabetes mellitus in 1994.     Comorbid conditions: hyperlipidemia, HTN and Neuropathy    Current diabetic medications include: Trulicity, Humalog 25 TID, Lantus 45 mg BID, Jardiance 25 mg daily    Intolerance to diabetes medications: Metformin upset GI     Weight trend: stable  Prior visit with dietician: yes  Current diet: on average, 3 meals per day  Current exercise: Golf twice weekly, with walks daily, swimming twice a week     Current monitoring regimen: home blood tests - 4 times daily  Has brought blood glucose log/meter:  Yes  Home blood sugar records: fasting range: 105 - 135 and postprandial range:  130-140  Any episodes of hypoglycemia? None   Hypoglycemia frequency and time(s):  1-2 episodes per year   Does patient have Glucagon emergency kit? No  Does patient have rapid acting carbohydrate? Yes  Does patient wear a medic alert bracelet or necklace? No    2. Acquired hypothyroidism  Has fatigue. 3. Essential hypertension  No headaches. 4. Mixed hyperlipidemia  No muscle pain. 5. Vitamin D deficiency  Has fatigue. 6. Obesity  On diet, exercise    7. Elevated creatinine        THYROID ULTRASOUND-       INDICATION- Type 2 diabetes, history of hypothyroidism. Patient is   status post left thyroid lobectomy.       FINDINGS-        The right thyroid lobe appears normal, measuring 4.9 x 1.4 x 1.6   cm. Thyroid parenchyma is mildly heterogeneous but no focal lesion   is detected. The left thyroid lobe is not visualized.       IMPRESSION-        1.  Mild heterogeneity of the right thyroid lobe with no focal   lesion detected.       2. Status post left thyroid lobectomy.            Read By- Emma Morgan        Released By- Kayden Montalvo   capsule Take 2 capsules by mouth 2 times daily 360 capsule 1    insulin glargine (LANTUS SOLOSTAR) 100 UNIT/ML injection pen Inject 50 Units into the skin 2 times daily 20 pen 3    potassium citrate (UROCIT-K) 10 MEQ (1080 MG) extended release tablet 1 TABLET ONCE DAILY (PRESCRIBER OK IS NEEDED FOR NEXT FILL) 90 tablet 2    Dulaglutide (TRULICITY) 1.5 QI/9.3GS SOPN INJECT 1.5MG AS DIRECTED ONCE EACH WEEK **REFRIGERATE** 2 mL 3    JARDIANCE 25 MG tablet 1 TABLET BY MOUTH ONCE DAILY (PRESCRIBER OK IS NEEDED FOR NEXT FILL) 30 tablet 3    allopurinol (ZYLOPRIM) 300 MG tablet TAKE 1 TABLET ONCE DAILY * (PRESCRIBER OK IS NEEDED FOR NEXT FILL) 90 tablet 3    amLODIPine (NORVASC) 10 MG tablet 1 TABLET BY MOUTH ONCE DAILY (NORVASC GENERIC) (PRESCRIBER OK IS NEEDED FOR NEXT FILL) 90 tablet 3    Testosterone (ANDROGEL) 20.25 MG/ACT (1.62%) GEL gel APPLY 2 PUMPS TO SKIN ONCE DAILY AS DIRECTED **MAY REFILL** 75 g 2    benazepril (LOTENSIN) 40 MG tablet 1 TABLET BY MOUTH ONCE DAY (PRESCRIBER OK NEEDED FOR NEXT REFILL) 90 tablet 3    Continuous Blood Gluc  (FREESTYLE ALBA READER) DELMIS 1 Units by Does not apply route as needed (as needed) 1 Device 0    Turmeric 500 MG CAPS Take 1,000 mg by mouth daily      UNABLE TO FIND CPAP supplies, water reservoir and tubing. Dx 327.23 1 each 0    Blood Glucose Monitoring Suppl (ONE TOUCH ULTRA SYSTEM KIT) W/DEVICE KIT AS DIRECTED 1 kit 0    aspirin EC 81 MG EC tablet Take 1 tablet by mouth daily. 30 tablet 11    Multiple Vitamin (MULTIVITAMIN PO) Take  by mouth daily. No current facility-administered medications for this visit.       Allergies   Allergen Reactions    Demerol      vomiting    Metformin And Related      intolerant     Family Status   Relation Name Status    Father      Mother  Alive    PUnc  (Not Specified)    Neg Hx  (Not Specified)       Review of Systems  Constitutional: has minimal, no fever, no recent weight gain, no recent weight loss, no changes in appetite  Eyes: no eye pain, bilateral cataracts removal   Ears, nose, throat: no nasal congestion, no sore throat, no earache, no decrease in hearing, no hoarseness, no dry mouth, no sinus problems, no difficulty swallowing, no neck lumps, no dental problems, no mouth sores, no ringing in ears  Pulmonary: no shortness of breath, no wheezing, no dyspnea on exertion, no cough  Cardiovascular: no chest pain, no lower extremity edema, no orthopnea, no intermittent leg claudication, no palpitations  Gastrointestinal: no abdominal pain, no nausea, no vomiting, no diarrhea, no constipation, no dysphagia, no heartburn, no bloating  Genitourinary: no dysuria, no urinary incontinence, no urinary hesitancy, no urinary frequency, no feelings of urinary urgency, no nocturia  Musculoskeletal: no joint swelling, no joint stiffness, no joint pain, no muscle cramps, no muscle pain, no bone pain, no fractures  Integument/Breast:  no skin rashes, no skin lesions, no itching, no dry skin  Neurological: Has numbness, has tingling, no weakness, no confusion, no headaches, no dizziness, no fainting, no tremors, no decrease in memory, no balance problems  Psychiatric: no anxiety, no depression, no insomnia  Hematologic/Lymphatic: no tendency for easy bleeding, no swollen lymph nodes, no tendency for easy bruising  Immunology: has seasonal allergies, no frequent infections, no frequent illnesses  Endocrine: no temperature intolerance     OBJECTIVE:  /63 (Site: Left Upper Arm, Position: Sitting, Cuff Size: Medium Adult)   Pulse 85   Ht 5' 11\" (1.803 m)   Wt 259 lb 3.2 oz (117.6 kg)   SpO2 98%   BMI 36.15 kg/m²      Wt Readings from Last 3 Encounters:   03/11/20 259 lb 3.2 oz (117.6 kg)   02/10/20 266 lb (120.7 kg)   11/06/19 263 lb (119.3 kg)       Constitutional: no acute distress, well appearing and well nourished  Psychiatric: oriented to person, place and time, judgement and insight and normal, recent and remote memory and intact and mood and affect are normal  Skin: skin and subcutaneous tissue is normal without mass, normal turgor  Head and Face: examination of head and face revealed no abnormalities  Eyes: no lid or conjunctival swelling, erythema or discharge, pupils are normal, equal, round, reactive to light  Ears/Nose: external inspection of ears and nose revealed no abnormalities, hearing is grossly normal  Oropharynx/Mouth/Face: lips, tongue and gums are normal with no lesions, the voice quality was normal  Neck: neck is supple and symmetric, with midline trachea and no masses, thyroid left lobe absent, right lobe normal  Lymphatics: normal cervical lymph nodes, normal supraclavicular nodes  Pulmonary: no increased work of breathing or signs of respiratory distress, lungs are clear to auscultation  Cardiovascular: normal heart rate and rhythm, normal S1 and S2, no murmurs and pedal pulses and 2+ bilaterally, No edema  Abdomen: abdomen is soft, non-tender with no masses  Musculoskeletal: normal gait and station and exam of the digits and nails are normal  Neurological: normal coordination and normal general cortical function    Visual inspection: 9/2019 appointment with optho  Deformity/amputation: absent  Skin lesions/pre-ulcerative calluses: absent  Edema: right- negative, left- negative    Sensory exam:   Monofilament sensation: abnormal  (minimum of 5 random plantar locations tested, avoiding callused areas - > 1 area with absence of sensation is + for neuropathy)  High risk feet     Plus at least one of the following:  Pulses: normal  Proprioception: Impaired  Vibration (128 Hz): Absent    ASSESSMENT/PLAN:    1. Type 2 diabetes, uncontrolled, with neuropathy (HCC)  Hemoglobin A1c 10.1-7.8-8.5-8.2  Patient states he is eating better. On low carb diet. Exercises. - continue medications   - Lantus  50 units bid  - Humalog 25 units qac plus correction 2 units for every 50 of BG above 150 qac    2.  Acquired

## 2020-03-15 ENCOUNTER — TELEPHONE (OUTPATIENT)
Dept: ENDOCRINOLOGY | Age: 70
End: 2020-03-15

## 2020-03-15 NOTE — TELEPHONE ENCOUNTER
I left patient a message that I am following on his creatinine to see if he has any problems and suggested to see his family doctor sooner. He has order to repeat creatinine, however, I believe that sooner evaluation would be better since it is a new problem.

## 2020-04-16 DIAGNOSIS — E29.1 PRIMARY MALE HYPOGONADISM: Chronic | ICD-10-CM

## 2020-04-16 DIAGNOSIS — E55.9 VITAMIN D DEFICIENCY: ICD-10-CM

## 2020-04-16 DIAGNOSIS — E03.9 ACQUIRED HYPOTHYROIDISM: ICD-10-CM

## 2020-04-16 DIAGNOSIS — E78.2 MIXED HYPERLIPIDEMIA: ICD-10-CM

## 2020-04-16 LAB
ALBUMIN SERPL-MCNC: 4.4 G/DL (ref 3.4–5)
ANION GAP SERPL CALCULATED.3IONS-SCNC: 17 MMOL/L (ref 3–16)
BUN BLDV-MCNC: 24 MG/DL (ref 7–20)
CALCIUM SERPL-MCNC: 10 MG/DL (ref 8.3–10.6)
CHLORIDE BLD-SCNC: 99 MMOL/L (ref 99–110)
CO2: 24 MMOL/L (ref 21–32)
CREAT SERPL-MCNC: 1.4 MG/DL (ref 0.8–1.3)
GFR AFRICAN AMERICAN: >60
GFR NON-AFRICAN AMERICAN: 50
GLUCOSE BLD-MCNC: 211 MG/DL (ref 70–99)
PHOSPHORUS: 4.5 MG/DL (ref 2.5–4.9)
POTASSIUM SERPL-SCNC: 4.9 MMOL/L (ref 3.5–5.1)
SODIUM BLD-SCNC: 140 MMOL/L (ref 136–145)

## 2020-06-11 RX ORDER — EMPAGLIFLOZIN 25 MG/1
TABLET, FILM COATED ORAL
Qty: 30 TABLET | Refills: 3 | Status: SHIPPED | OUTPATIENT
Start: 2020-06-11 | End: 2020-10-12

## 2020-06-22 ENCOUNTER — TELEPHONE (OUTPATIENT)
Dept: INTERNAL MEDICINE CLINIC | Age: 70
End: 2020-06-22

## 2020-06-22 RX ORDER — DULAGLUTIDE 1.5 MG/.5ML
INJECTION, SOLUTION SUBCUTANEOUS
Qty: 2 ML | Refills: 3 | Status: SHIPPED | OUTPATIENT
Start: 2020-06-22 | End: 2020-10-12

## 2020-06-22 NOTE — TELEPHONE ENCOUNTER
PA SUBMITTED VIA CMM FOR Testosterone 20.25 MG/ACT(1.62%) gel  Key: [id number] - PA Case ID: [id number] - Rx #: [id number]   Approved today   PA Case: [id number], Status: Approved, Coverage Starts on: 3/23/2020 12:00:00 AM, Coverage Ends on: 6/22/2021 12:00:00 AM.

## 2020-07-01 ENCOUNTER — OFFICE VISIT (OUTPATIENT)
Dept: INTERNAL MEDICINE CLINIC | Age: 70
End: 2020-07-01
Payer: MEDICARE

## 2020-07-01 VITALS
HEART RATE: 78 BPM | DIASTOLIC BLOOD PRESSURE: 60 MMHG | BODY MASS INDEX: 35.84 KG/M2 | TEMPERATURE: 99.1 F | WEIGHT: 256 LBS | SYSTOLIC BLOOD PRESSURE: 112 MMHG | HEIGHT: 71 IN

## 2020-07-01 PROCEDURE — 3052F HG A1C>EQUAL 8.0%<EQUAL 9.0%: CPT | Performed by: INTERNAL MEDICINE

## 2020-07-01 PROCEDURE — 99214 OFFICE O/P EST MOD 30 MIN: CPT | Performed by: INTERNAL MEDICINE

## 2020-07-01 ASSESSMENT — ENCOUNTER SYMPTOMS
SHORTNESS OF BREATH: 0
ABDOMINAL PAIN: 0
NAUSEA: 0
VOMITING: 0
WHEEZING: 0

## 2020-07-01 ASSESSMENT — PATIENT HEALTH QUESTIONNAIRE - PHQ9
2. FEELING DOWN, DEPRESSED OR HOPELESS: 0
SUM OF ALL RESPONSES TO PHQ QUESTIONS 1-9: 0
1. LITTLE INTEREST OR PLEASURE IN DOING THINGS: 0
SUM OF ALL RESPONSES TO PHQ QUESTIONS 1-9: 0
SUM OF ALL RESPONSES TO PHQ9 QUESTIONS 1 & 2: 0

## 2020-07-01 NOTE — PROGRESS NOTES
Tobacco Use    Smoking status: Never Smoker    Smokeless tobacco: Never Used   Substance and Sexual Activity    Alcohol use: Yes     Alcohol/week: 1.0 standard drinks     Types: 1 Cans of beer per week     Comment: 4 of 7 days     Drug use: No    Sexual activity: Never   Lifestyle    Physical activity     Days per week: None     Minutes per session: None    Stress: None   Relationships    Social connections     Talks on phone: None     Gets together: None     Attends Buddhism service: None     Active member of club or organization: None     Attends meetings of clubs or organizations: None     Relationship status: None    Intimate partner violence     Fear of current or ex partner: None     Emotionally abused: None     Physically abused: None     Forced sexual activity: None   Other Topics Concern    None   Social History Narrative    None     Family History   Problem Relation Age of Onset    Cancer Father         skin    Coronary Art Dis Father     Diabetes Father     Coronary Art Dis Paternal Uncle     Diabetes Paternal Uncle     Stroke Paternal Uncle     Heart Disease Neg Hx     High Blood Pressure Neg Hx     Osteoporosis Neg Hx     Thyroid Disease Neg Hx     Heart Attack Neg Hx        Review of Systems   Constitutional: Negative for diaphoresis and unexpected weight change. Respiratory: Negative for shortness of breath and wheezing. Cardiovascular: Negative for chest pain and palpitations. Gastrointestinal: Negative for abdominal pain, nausea and vomiting. Genitourinary: Negative for difficulty urinating and flank pain. Neurological: Negative for dizziness, tremors, weakness and light-headedness.        OBJECTIVE:  Pulse Readings from Last 4 Encounters:   07/01/20 78   03/11/20 85   02/10/20 61   11/06/19 67     Wt Readings from Last 4 Encounters:   07/01/20 256 lb (116.1 kg)   03/11/20 259 lb 3.2 oz (117.6 kg)   02/10/20 266 lb (120.7 kg)   11/06/19 263 lb (119.3 kg)     BP Readings from Last 4 Encounters:   07/01/20 112/60   03/11/20 130/63   02/10/20 112/66   11/06/19 (!) 105/59     Physical Exam  Vitals signs and nursing note reviewed. Constitutional:       Appearance: He is well-developed. He is not ill-appearing. Eyes:      General: No scleral icterus. Conjunctiva/sclera: Conjunctivae normal.   Neck:      Thyroid: No thyromegaly. Vascular: No carotid bruit. Cardiovascular:      Rate and Rhythm: Normal rate and regular rhythm. Pulses: Normal pulses. Heart sounds: Normal heart sounds. Pulmonary:      Effort: Pulmonary effort is normal. No respiratory distress. Breath sounds: Normal breath sounds. No wheezing. Abdominal:      General: Bowel sounds are normal.   Musculoskeletal:      Right lower leg: No edema. Left lower leg: No edema. Neurological:      General: No focal deficit present. Mental Status: He is alert and oriented to person, place, and time.          CBC:   Lab Results   Component Value Date    WBC 8.0 04/03/2019    HGB 13.4 04/03/2019    HCT 40.6 04/03/2019     04/03/2019     CMP:  Lab Results   Component Value Date     04/16/2020    K 4.9 04/16/2020    CL 99 04/16/2020    CO2 24 04/16/2020    ANIONGAP 17 04/16/2020    GLUCOSE 211 04/16/2020    BUN 24 04/16/2020    CREATININE 1.4 04/16/2020    GFRAA >60 04/16/2020    GFRAA >60 01/19/2013    CALCIUM 10.0 04/16/2020    PROT 7.8 03/04/2020    PROT 7.5 02/16/2013    LABALBU 4.4 04/16/2020    AGRATIO 1.0 03/04/2020    BILITOT 0.5 03/04/2020    ALKPHOS 104 03/04/2020    ALT 29 03/04/2020    AST 26 03/04/2020    GLOB 3.9 03/04/2020     URINALYSIS:  Lab Results   Component Value Date    LABMICR 2.40 03/04/2020     HBA1C:   Lab Results   Component Value Date    LABA1C 8.2 03/04/2020    .6 03/04/2020     MICRO/ALB:   Lab Results   Component Value Date    LABMICR 2.40 03/04/2020    LABCREA 135.4 03/04/2020    MALBCR 17.7 03/04/2020     LIPID:  Lab Results   Component Value Date    CHOL 115 03/04/2020    TRIG 168 03/04/2020    HDL 31 03/04/2020    HDL 27 03/17/2012    LDLCALC 50 03/04/2020    LABVLDL 34 03/04/2020     TSH:   Lab Results   Component Value Date    TSHREFLEX 0.80 09/26/2016     PSA:   Lab Results   Component Value Date    PSA 2.21 04/03/2019        ASSESSMENT/PLAN:  Assessment/Plan:  Love was seen today for 6 month follow-up, hypertension and diabetes. Diagnoses and all orders for this visit:  Elevated BUN and creatinine. Would repeat basic metabolic panel. Avoid nonsteroidal anti-inflammatory drug. Encourage hydration. May need further work-up. Primary male hypogonadism  Patient will continue testosterone supplement. Will check PSA. Screening for malignant neoplasm of prostate  -     Psa screening; Future    Acquired hypothyroidism  -     TSH with Reflex; Future  Continue current levothyroxine. Essential hypertension  -     BASIC METABOLIC PANEL; Future  The current medical regimen is effective;  continue present plan and medications. Type 2 diabetes, uncontrolled-no better control. Last A1c 8.2., with neuropathy ([address]    Per patient no diabetic retinopathy.   She has been following up with endocrinologist.        [employer] This Encounter   Procedures    TSH with Reflex     Standing Status:   Future     Number of Occurrences:   1     Standing Expiration Date:   7/1/2021    BASIC METABOLIC PANEL     Standing Status:   Future     Number of Occurrences:   1     Standing Expiration Date:   7/1/2021    Psa screening     Standing Status:   Future     Number of Occurrences:   1     Standing Expiration Date:   7/1/2021    CBC     Standing Status:   Future     Standing Expiration Date:   7/1/2021     Current Outpatient Medications   Medication Sig Dispense Refill    Dulaglutide (TRULICITY) 1.5 LE/4.4FF SOPN INJECT 1.5MG AS DIRECTED ONCE EACH WEEK (PRESCRIBER OK IS NEEDED FOR NEXT FILL) **REFRIGERATE** 2 mL 3    JARDIANCE 25 MG tablet 1 TABLET BY MOUTH ONCE DAILY (PRESCRIBER OK NEEDED FOR NEXT REFILL) 30 tablet 3    Testosterone (ANDROGEL) 20.25 MG/ACT (1.62%) GEL gel APPLY 2 PUMPS TO SKIN ONCE DAILY AS DIRECTED (PRESCRIBER OK NEEDED FOR NEXT REFILL) 75 g 3    blood glucose test strips (ONE TOUCH ULTRA TEST) strip qid 400 strip 3    insulin lispro (HUMALOG KWIKPEN) 200 UNIT/ML SOPN pen INJECT 25 UNITS BEFORE MEALS plus correction 2 units for every 50 of BG above 150 qac 20 pen 3    levothyroxine (SYNTHROID) 100 MCG tablet 1 TABLET BY MOUTH ONCE DAILY (PRESCRIBER OK NEEDED FOR NEXT REFILL) 90 tablet 1    vitamin D (ERGOCALCIFEROL) 1.25 MG (90442 UT) CAPS capsule 1 CAPSULE EVERY WEEK **MAY REFILL** 12 capsule 3    atorvastatin (LIPITOR) 10 MG tablet Take 1 tablet by mouth daily 90 tablet 1    omega-3 acid ethyl esters (LOVAZA) 1 g capsule Take 2 capsules by mouth 2 times daily 360 capsule 1    insulin glargine (LANTUS SOLOSTAR) 100 UNIT/ML injection pen Inject 50 Units into the skin 2 times daily 20 pen 3    potassium citrate (UROCIT-K) 10 MEQ (1080 MG) extended release tablet 1 TABLET ONCE DAILY (PRESCRIBER OK IS NEEDED FOR NEXT FILL) 90 tablet 2    allopurinol (ZYLOPRIM) 300 MG tablet TAKE 1 TABLET ONCE DAILY * (PRESCRIBER OK IS NEEDED FOR NEXT FILL) 90 tablet 3    amLODIPine (NORVASC) 10 MG tablet 1 TABLET BY MOUTH ONCE DAILY (NORVASC GENERIC) (PRESCRIBER OK IS NEEDED FOR NEXT FILL) 90 tablet 3    benazepril (LOTENSIN) 40 MG tablet 1 TABLET BY MOUTH ONCE DAY (PRESCRIBER OK NEEDED FOR NEXT REFILL) 90 tablet 3    Continuous Blood Gluc  (FREESTYLE ALBA READER) DELMIS 1 Units by Does not apply route as needed (as needed) 1 Device 0    Turmeric 500 MG CAPS Take 1,000 mg by mouth daily      UNABLE TO FIND CPAP supplies, water reservoir and tubing. Dx 327.23 1 each 0    Blood Glucose Monitoring Suppl (ONE TOUCH ULTRA SYSTEM KIT) W/DEVICE KIT AS DIRECTED 1 kit 0    aspirin EC 81 MG EC tablet Take 1 tablet by mouth daily.  30 tablet 11   

## 2020-07-13 ENCOUNTER — HOSPITAL ENCOUNTER (OUTPATIENT)
Age: 70
Discharge: HOME | End: 2020-07-13
Payer: MEDICARE

## 2020-07-13 LAB
A/G RATIO: 1.3 (ref 1.1–2.2)
ALBUMIN SERPL-MCNC: 4.3 G/DL (ref 3.4–5)
ALP BLD-CCNC: 96 U/L (ref 40–129)
ALT SERPL-CCNC: 36 U/L (ref 10–40)
ANION GAP SERPL CALCULATED.3IONS-SCNC: 14 MMOL/L (ref 3–16)
AST SERPL-CCNC: 28 U/L (ref 15–37)
BILIRUB SERPL-MCNC: 0.3 MG/DL (ref 0–1)
BUN BLDV-MCNC: 21 MG/DL (ref 7–20)
CALCIUM SERPL-MCNC: 9.7 MG/DL (ref 8.3–10.6)
CHLORIDE BLD-SCNC: 100 MMOL/L (ref 99–110)
CHOLESTEROL, TOTAL: 119 MG/DL (ref 0–199)
CO2: 23 MMOL/L (ref 21–32)
CREAT SERPL-MCNC: 1.1 MG/DL (ref 0.8–1.3)
ESTIMATED AVERAGE GLUCOSE: 171.4 MG/DL
GFR AFRICAN AMERICAN: >60
GFR NON-AFRICAN AMERICAN: >60
GLOBULIN: 3.3 G/DL
GLUCOSE BLD-MCNC: 127 MG/DL (ref 70–99)
HBA1C MFR BLD: 7.6 %
HCT VFR BLD CALC: 43 % (ref 40.5–52.5)
HDLC SERPL-MCNC: 29 MG/DL (ref 40–60)
HEMOGLOBIN: 14 G/DL (ref 13.5–17.5)
LDL CHOLESTEROL CALCULATED: 45 MG/DL
MCH RBC QN AUTO: 28.9 PG (ref 26–34)
MCHC RBC AUTO-ENTMCNC: 32.6 G/DL (ref 31–36)
MCV RBC AUTO: 88.8 FL (ref 80–100)
PDW BLD-RTO: 15.6 % (ref 12.4–15.4)
PHOSPHORUS: 4.5 MG/DL (ref 2.5–4.9)
PLATELET # BLD: 262 K/UL (ref 135–450)
PMV BLD AUTO: 9.5 FL (ref 5–10.5)
POTASSIUM SERPL-SCNC: 4.7 MMOL/L (ref 3.5–5.1)
RBC # BLD: 4.84 M/UL (ref 4.2–5.9)
SODIUM BLD-SCNC: 137 MMOL/L (ref 136–145)
T4 FREE: 1.4 NG/DL (ref 0.9–1.8)
TOTAL PROTEIN: 7.6 G/DL (ref 6.4–8.2)
TRIGL SERPL-MCNC: 225 MG/DL (ref 0–150)
TSH SERPL DL<=0.05 MIU/L-ACNC: 0.55 UIU/ML (ref 0.27–4.2)
VITAMIN D 25-HYDROXY: 65.8 NG/ML
VLDLC SERPL CALC-MCNC: 45 MG/DL
WBC # BLD: 8 K/UL (ref 4–11)

## 2020-07-13 PROCEDURE — 83036 HEMOGLOBIN GLYCOSYLATED A1C: CPT

## 2020-07-13 PROCEDURE — 36415 COLL VENOUS BLD VENIPUNCTURE: CPT

## 2020-07-13 PROCEDURE — 84100 ASSAY OF PHOSPHORUS: CPT

## 2020-07-13 PROCEDURE — 80061 LIPID PANEL: CPT

## 2020-07-13 PROCEDURE — 85027 COMPLETE CBC AUTOMATED: CPT

## 2020-07-13 PROCEDURE — 80053 COMPREHEN METABOLIC PANEL: CPT

## 2020-07-13 PROCEDURE — 84443 ASSAY THYROID STIM HORMONE: CPT

## 2020-07-13 PROCEDURE — 84439 ASSAY OF FREE THYROXINE: CPT

## 2020-07-13 PROCEDURE — 82306 VITAMIN D 25 HYDROXY: CPT

## 2020-07-19 ENCOUNTER — TELEPHONE (OUTPATIENT)
Dept: ENDOCRINOLOGY | Age: 70
End: 2020-07-19

## 2020-07-21 NOTE — TELEPHONE ENCOUNTER
Pt cx last appt because he doesn't want to be seen in [address] [place] [address] and doesn't want a video appt. Pt wants to wait for an appt in [place].

## 2020-07-21 NOTE — TELEPHONE ENCOUNTER
Please schedule patient in [facility] office when we will start seeing patients there.

## 2020-07-21 NOTE — TELEPHONE ENCOUNTER
Samy has sent herself a reminder to schedule the pt when you return to [facility].

## 2020-07-24 RX ORDER — LEVOTHYROXINE SODIUM 0.1 MG/1
TABLET ORAL
Qty: 90 TABLET | Refills: 0 | Status: SHIPPED | OUTPATIENT
Start: 2020-07-24 | End: 2020-12-31

## 2020-09-08 NOTE — PROGRESS NOTES
Kennedy Gutierrez is a 69 y.o. male who presents for Type 2 diabetes mellitus. Current symptoms/problems include hyperglycemia and show no change. 1.  Type 2 diabetes, uncontrolled, with neuropathy (HCC) [E11.40, E11.65]    Diagnosed with Type 2 diabetes mellitus in 1994.     Comorbid conditions: hyperlipidemia, HTN and Neuropathy    Current diabetic medications include: Trulicity, Humalog 25 TID, Lantus 50 mg BID, Jardiance 25 mg daily    Intolerance to diabetes medications: Metformin upset GI     Weight trend: stable  Prior visit with dietician: yes  Current diet: on average, 3 meals per day  Current exercise: Golf twice weekly, with walks daily, swimming twice a week     Current monitoring regimen: home blood tests - 4 times daily  Has brought blood glucose log/meter:  Yes  Home blood sugar records: fasting range: 105 - 135 and postprandial range:  130-140  Any episodes of hypoglycemia? None   Hypoglycemia frequency and time(s):  1-2 episodes per year   Does patient have Glucagon emergency kit? No  Does patient have rapid acting carbohydrate? Yes  Does patient wear a medic alert bracelet or necklace? No    2. Acquired hypothyroidism  Has fatigue. 3. Essential hypertension  No headaches. 4. Mixed hyperlipidemia  No muscle pain. 5. Vitamin D deficiency  Has fatigue. 6. Obesity  On diet, exercise    7. Elevated creatinine  Resolved      THYROID ULTRASOUND-       INDICATION- Type 2 diabetes, history of hypothyroidism. Patient is   status post left thyroid lobectomy.       FINDINGS-        The right thyroid lobe appears normal, measuring 4.9 x 1.4 x 1.6   cm. Thyroid parenchyma is mildly heterogeneous but no focal lesion   is detected. The left thyroid lobe is not visualized.       IMPRESSION-        1.  Mild heterogeneity of the right thyroid lobe with no focal   lesion detected.       2. Status post left thyroid lobectomy.            Read By- Lolis Sinha        Released By- Lolis Sinha      Released Date Time- 02/08/10 1137            - Jolynn Medrano         Lab Results   Component Value Date    LABA1C 7.6 07/13/2020     Lab Results   Component Value Date    .4 07/13/2020           Past Medical History:   Diagnosis Date    Hearing loss     Hypogonadism in male     Hypothyroidism     Kidney stones     MAO (obstructive sleep apnea)     Type II or unspecified type diabetes mellitus without mention of complication, not stated as uncontrolled       Patient Active Problem List   Diagnosis    Diabetic polyneuropathy ([facility]    Acquired hypothyroidism    Mixed hyperlipidemia    Primary male hypogonadism    Vitamin D deficiency    Type 2 diabetes, uncontrolled, with neuropathy ([facility]    Essential hypertension    Obstructive sleep apnea syndrome    Class 2 severe obesity with serious comorbidity and body mass index (BMI) of 35.0 to 35.9 in adult [facility])     Past Surgical History:   Procedure Laterality Date    CATARACT REMOVAL WITH IMPLANT Bilateral     CHOLECYSTECTOMY      9/11    COLONOSCOPY      2009    THYROIDECTOMY      lt lobectomy 2/2010    TONSILLECTOMY       Social History     Socioeconomic History    Marital status:      Spouse name: Not on file    Number of children: Not on file    Years of education: Not on file    Highest education level: Not on file   Occupational History    Occupation: retired pediatrician   Social Needs    Financial resource strain: Not on file    Food insecurity     Worry: Not on file     Inability: Not on file   [employer] needs     Medical: Not on file     Non-medical: Not on file   Tobacco Use    Smoking status: Never Smoker    Smokeless tobacco: Never Used   Substance and Sexual Activity    Alcohol use:  Yes     Alcohol/week: 1.0 standard drinks     Types: 1 Cans of beer per week     Comment: 4 of 7 days     Drug use: No    Sexual activity: Never   Lifestyle    Physical activity     Days per week: Not Mother  Alive   Glorious Yolanda  (Not Specified)    Neg Hx  (Not Specified)       Review of Systems  Constitutional: has minimal, no fever, no recent weight gain, no recent weight loss, no changes in appetite  Eyes: no eye pain, bilateral cataracts removal   Ears, nose, throat: no nasal congestion, no sore throat, no earache, no decrease in hearing, no hoarseness, no dry mouth, no sinus problems, no difficulty swallowing, no neck lumps, no dental problems, no mouth sores, no ringing in ears  Pulmonary: no shortness of breath, no wheezing, no dyspnea on exertion, no cough  Cardiovascular: no chest pain, no lower extremity edema, no orthopnea, no intermittent leg claudication, no palpitations  Gastrointestinal: no abdominal pain, no nausea, no vomiting, no diarrhea, no constipation, no dysphagia, no heartburn, no bloating  Genitourinary: no dysuria, no urinary incontinence, no urinary hesitancy, no urinary frequency, no feelings of urinary urgency, no nocturia  Musculoskeletal: no joint swelling, no joint stiffness, no joint pain, no muscle cramps, no muscle pain, no bone pain, no fractures  Integument/Breast:  no skin rashes, no skin lesions, no itching, no dry skin  Neurological: Has numbness, has tingling, no weakness, no confusion, no headaches, no dizziness, no fainting, no tremors, no decrease in memory, no balance problems  Psychiatric: no anxiety, no depression, no insomnia  Hematologic/Lymphatic: no tendency for easy bleeding, no swollen lymph nodes, no tendency for easy bruising  Immunology: has seasonal allergies, no frequent infections, no frequent illnesses  Endocrine: no temperature intolerance     OBJECTIVE:  BP (!) 96/54   Pulse 82   Temp 98.6 °F (37 °C)   Resp 14   Ht 5' 11\" (1.803 m)   Wt 259 lb (117.5 kg)   SpO2 97%   BMI 36.12 kg/m²      Wt Readings from Last 3 Encounters:   09/09/20 259 lb (117.5 kg)   07/01/20 256 lb (116.1 kg)   03/11/20 259 lb 3.2 oz (117.6 kg)       Constitutional: no acute distress, well appearing and well nourished  Psychiatric: oriented to person, place and time, judgement and insight and normal, recent and remote memory and intact and mood and affect are normal  Skin: skin and subcutaneous tissue is normal without mass, normal turgor  Head and Face: examination of head and face revealed no abnormalities  Eyes: no lid or conjunctival swelling, erythema or discharge, pupils are normal, equal, round, reactive to light  Ears/Nose: external inspection of ears and nose revealed no abnormalities, hearing is grossly normal  Oropharynx/Mouth/Face: lips, tongue and gums are normal with no lesions, the voice quality was normal  Neck: neck is supple and symmetric, with midline trachea and no masses, thyroid left lobe absent, right lobe normal  Lymphatics: normal cervical lymph nodes, normal supraclavicular nodes  Pulmonary: no increased work of breathing or signs of respiratory distress, lungs are clear to auscultation  Cardiovascular: normal heart rate and rhythm, normal S1 and S2, no murmurs and pedal pulses and 2+ bilaterally, No edema  Abdomen: abdomen is soft, non-tender with no masses  Musculoskeletal: normal gait and station and exam of the digits and nails are normal  Neurological: normal coordination and normal general cortical function    Visual inspection: 9/2019 appointment with optho  Deformity/amputation: absent  Skin lesions/pre-ulcerative calluses: absent  Edema: right- negative, left- negative    Sensory exam:   Monofilament sensation: abnormal  (minimum of 5 random plantar locations tested, avoiding callused areas - > 1 area with absence of sensation is + for neuropathy)  High risk feet     Plus at least one of the following:  Pulses: normal  Proprioception: Impaired  Vibration (128 Hz): Absent    ASSESSMENT/PLAN:    1. Type 2 diabetes, uncontrolled, with neuropathy (HCC)  Hemoglobin A1c 10.1-7.8-8.5-8.2-7.6  Patient states he is eating better.  On low carb diet.  Exercises. - continue medications   - Lantus  50 units bid  - Humalog 25 units qac plus correction 2 units for every 50 of BG above 150 qac    2. Acquired hypothyroidism  - Levothyroxine 0.1 mg   - TSH 0.68-1.15-0.55    3. Essential hypertension  Same medications  Hypertension well controlled     4. Mixed hyperlipidemia  LDL 84-50-45  -168-225  - Atorvastatin   - LDL well controlled     5. Vitamin D deficiency  25OH vit D 45-52-65.8  Weekly Supplement  - Vitamin D levels within normal limits     6. Obesity  Diet, exercise    7. Elevated creatinine  Resolved  Repeat in 1 month  Creatinine 1.5 -1.1  GFR 46-more than 60  See family doctor for evaluation. Reviewed and/or ordered clinical lab results Yes  Reviewed and/or ordered radiology tests Yes  Reviewed and/or ordered other diagnostic tests No  Discussed test results with performing physician No  Independently reviewed image, tracing, or specimen No  Made a decision to obtain old records No  Reviewed old records Yes  Obtained history from other than patient No    Yamel Cox was counseled regarding symptoms of diabetes diagnosis, course and complications of disease if inadequately treated, side effects of medications, diagnosis, treatment options, and prognosis, risks, benefits, complications, and alternatives of treatment, labs, imaging and other studies and treatment targets and goals. He understands instructions and counseling. Return in about 3 months (around 12/9/2020) for diabetes.

## 2020-09-09 ENCOUNTER — OFFICE VISIT (OUTPATIENT)
Dept: ENDOCRINOLOGY | Age: 70
End: 2020-09-09
Payer: MEDICARE

## 2020-09-09 VITALS
DIASTOLIC BLOOD PRESSURE: 54 MMHG | HEART RATE: 82 BPM | BODY MASS INDEX: 36.26 KG/M2 | WEIGHT: 259 LBS | OXYGEN SATURATION: 97 % | HEIGHT: 71 IN | SYSTOLIC BLOOD PRESSURE: 96 MMHG | TEMPERATURE: 98.6 F | RESPIRATION RATE: 14 BRPM

## 2020-09-09 PROCEDURE — 3051F HG A1C>EQUAL 7.0%<8.0%: CPT | Performed by: INTERNAL MEDICINE

## 2020-09-09 PROCEDURE — 99214 OFFICE O/P EST MOD 30 MIN: CPT | Performed by: INTERNAL MEDICINE

## 2020-09-29 RX ORDER — FLASH GLUCOSE SENSOR
KIT MISCELLANEOUS
Qty: 2 EACH | Refills: 5 | Status: SHIPPED | OUTPATIENT
Start: 2020-09-29 | End: 2021-03-29

## 2020-09-29 NOTE — TELEPHONE ENCOUNTER
Medication:   Requested Prescriptions     Pending Prescriptions Disp Refills    Continuous Blood Gluc Sensor (FREESTYLE ALBA 14 DAY SENSOR) MISC [Pharmacy Med Name: [facility] [address]  5     Sig: USE 1 SENSOR EVERY 14 DAYS (PRESCRIBER OK NEEDED FOR NEXT REFILL)         Last appt: 9/9/2020   Next appt: 12/9/2020    Last OARRS: No flowsheet data found.

## 2020-10-08 ENCOUNTER — TELEPHONE (OUTPATIENT)
Dept: DERMATOLOGY | Age: 70
End: 2020-10-08

## 2020-10-08 NOTE — TELEPHONE ENCOUNTER
Patient states he has questionable spots on right torso and shoulder. Pigmented nevis with a little texture to them Dr Boris Ron suggested he have them looked at. Patient states he is a personal friend of Dr Home Bullock and last saw him in 2010. Please advise. Thank you!

## 2020-10-12 RX ORDER — DULAGLUTIDE 1.5 MG/.5ML
INJECTION, SOLUTION SUBCUTANEOUS
Qty: 2 ML | Refills: 3 | Status: SHIPPED | OUTPATIENT
Start: 2020-10-12 | End: 2021-02-01

## 2020-10-12 RX ORDER — EMPAGLIFLOZIN 25 MG/1
TABLET, FILM COATED ORAL
Qty: 30 TABLET | Refills: 3 | Status: SHIPPED | OUTPATIENT
Start: 2020-10-12 | End: 2021-01-04 | Stop reason: SDUPTHER

## 2020-10-15 ENCOUNTER — OFFICE VISIT (OUTPATIENT)
Dept: DERMATOLOGY | Age: 70
End: 2020-10-15
Payer: MEDICARE

## 2020-10-15 VITALS — TEMPERATURE: 98.2 F

## 2020-10-15 PROCEDURE — 99213 OFFICE O/P EST LOW 20 MIN: CPT | Performed by: DERMATOLOGY

## 2020-10-15 PROCEDURE — 11102 TANGNTL BX SKIN SINGLE LES: CPT | Performed by: DERMATOLOGY

## 2020-10-15 NOTE — PATIENT INSTRUCTIONS

## 2020-10-19 LAB — DERMATOLOGY PATHOLOGY REPORT: ABNORMAL

## 2020-11-09 RX ORDER — TESTOSTERONE 16.2 MG/G
GEL TRANSDERMAL
Qty: 75 G | Refills: 3 | Status: SHIPPED | OUTPATIENT
Start: 2020-11-09 | End: 2021-05-20

## 2020-12-07 ENCOUNTER — HOSPITAL ENCOUNTER (OUTPATIENT)
Age: 70
Discharge: HOME | End: 2020-12-07
Payer: MEDICARE

## 2020-12-07 LAB
A/G RATIO: 1.4 (ref 1.1–2.2)
ALBUMIN SERPL-MCNC: 4.4 G/DL (ref 3.4–5)
ALP BLD-CCNC: 103 U/L (ref 40–129)
ALT SERPL-CCNC: 34 U/L (ref 10–40)
ANION GAP SERPL CALCULATED.3IONS-SCNC: 12 MMOL/L (ref 3–16)
AST SERPL-CCNC: 24 U/L (ref 15–37)
BILIRUB SERPL-MCNC: 0.4 MG/DL (ref 0–1)
BUN BLDV-MCNC: 24 MG/DL (ref 7–20)
CALCIUM SERPL-MCNC: 9.9 MG/DL (ref 8.3–10.6)
CHLORIDE BLD-SCNC: 104 MMOL/L (ref 99–110)
CHOLESTEROL, TOTAL: 129 MG/DL (ref 0–199)
CO2: 24 MMOL/L (ref 21–32)
CREAT SERPL-MCNC: 1.2 MG/DL (ref 0.8–1.3)
CREATININE URINE: 131.2 MG/DL (ref 39–259)
ESTIMATED AVERAGE GLUCOSE: 203 MG/DL
GFR AFRICAN AMERICAN: >60
GFR NON-AFRICAN AMERICAN: 60
GLOBULIN: 3.1 G/DL
GLUCOSE BLD-MCNC: 110 MG/DL (ref 70–99)
HBA1C MFR BLD: 8.7 %
HDLC SERPL-MCNC: 28 MG/DL (ref 40–60)
LDL CHOLESTEROL CALCULATED: 58 MG/DL
MICROALBUMIN UR-MCNC: <1.2 MG/DL
MICROALBUMIN/CREAT UR-RTO: NORMAL MG/G (ref 0–30)
POTASSIUM SERPL-SCNC: 4.4 MMOL/L (ref 3.5–5.1)
SODIUM BLD-SCNC: 140 MMOL/L (ref 136–145)
T4 FREE: 1.3 NG/DL (ref 0.9–1.8)
TOTAL PROTEIN: 7.5 G/DL (ref 6.4–8.2)
TRIGL SERPL-MCNC: 213 MG/DL (ref 0–150)
TSH SERPL DL<=0.05 MIU/L-ACNC: 0.85 UIU/ML (ref 0.27–4.2)
VITAMIN D 25-HYDROXY: 64.1 NG/ML
VLDLC SERPL CALC-MCNC: 43 MG/DL

## 2020-12-07 PROCEDURE — 82043 UR ALBUMIN QUANTITATIVE: CPT

## 2020-12-07 PROCEDURE — 80061 LIPID PANEL: CPT

## 2020-12-07 PROCEDURE — 83036 HEMOGLOBIN GLYCOSYLATED A1C: CPT

## 2020-12-07 PROCEDURE — 82306 VITAMIN D 25 HYDROXY: CPT

## 2020-12-07 PROCEDURE — 82570 ASSAY OF URINE CREATININE: CPT

## 2020-12-07 PROCEDURE — 80053 COMPREHEN METABOLIC PANEL: CPT

## 2020-12-07 PROCEDURE — 84439 ASSAY OF FREE THYROXINE: CPT

## 2020-12-07 PROCEDURE — 84443 ASSAY THYROID STIM HORMONE: CPT

## 2020-12-09 ENCOUNTER — OFFICE VISIT (OUTPATIENT)
Dept: ENDOCRINOLOGY | Age: 70
End: 2020-12-09
Payer: MEDICARE

## 2020-12-09 VITALS
HEART RATE: 67 BPM | DIASTOLIC BLOOD PRESSURE: 63 MMHG | WEIGHT: 260 LBS | HEIGHT: 71 IN | BODY MASS INDEX: 36.4 KG/M2 | OXYGEN SATURATION: 98 % | RESPIRATION RATE: 14 BRPM | SYSTOLIC BLOOD PRESSURE: 118 MMHG

## 2020-12-09 PROCEDURE — 99214 OFFICE O/P EST MOD 30 MIN: CPT | Performed by: INTERNAL MEDICINE

## 2020-12-09 PROCEDURE — 3052F HG A1C>EQUAL 8.0%<EQUAL 9.0%: CPT | Performed by: INTERNAL MEDICINE

## 2020-12-09 NOTE — PROGRESS NOTES
on file     Minutes per session: Not on file    Stress: Not on file   Relationships    Social connections     Talks on phone: Not on file     Gets together: Not on file     Attends [employer] service: Not on file     Active member of club or organization: Not on file     Attends meetings of clubs or organizations: Not on file     Relationship status: Not on file    Intimate partner violence     Fear of current or ex partner: Not on file     Emotionally abused: Not on file     Physically abused: Not on file     Forced sexual activity: Not on file   Other Topics Concern    Not on file   Social History Narrative    Not on file     Family History   Problem Relation Age of Onset    Cancer Father         skin    Coronary Art Dis Father     Diabetes Father     Coronary Art Dis Paternal Uncle     Diabetes Paternal Uncle     Stroke Paternal Uncle     Heart Disease Neg Hx     High Blood Pressure Neg Hx     Osteoporosis Neg Hx     Thyroid Disease Neg Hx     Heart Attack Neg Hx      Current Outpatient Medications   Medication Sig Dispense Refill    Testosterone (ANDROGEL) 20.25 MG/ACT (1.62%) GEL gel APPLY 2 PUMPS TO SKIN ONCE DAILY AS DIRECTED **MAY REFILL** 75 g 3    Dulaglutide (TRULICITY) 1.5 QT/2.2CG SOPN INJECT 1.5MG AS DIRECTED ONCE EACH WEEK (PRESCRIBER OK IS NEEDED FOR NEXT FILL) *REFRIGERATE** 2 mL 3    JARDIANCE 25 MG tablet 1 TABLET BY MOUTH ONCE DAILY (PRESCRIBER OK IS NEEDED FOR NEXT FILL) 30 tablet 3    Insulin Pen Needle (B-D UF III MINI PEN NEEDLES) 31G X 5 MM MISC USE 6 TIMES PER DAY OR AS DIRECTED FOR LANTUS - HUMALOG (PRESCRIBER OK IS NEEDED FOR NEXT FILL) 200 each 11    Continuous Blood Gluc Sensor (FREESTYLE ALBA 14 DAY SENSOR) MISC USE 1 SENSOR EVERY 14 DAYS (PRESCRIBER OK NEEDED FOR NEXT REFILL) 2 each 5    benazepril (LOTENSIN) 40 MG tablet Take 1 tablet by mouth daily 90 tablet 3    levothyroxine (SYNTHROID) 100 MCG tablet 1 TABLET BY MOUTH ONCE DAILY <NEW TABLET-BRAND> Alive    PUnc  (Not Specified)    Neg Hx  (Not Specified)       Review of Systems  Constitutional: has minimal, no fever, no recent weight gain, no recent weight loss, no changes in appetite  Eyes: no eye pain, bilateral cataracts removal   Ears, nose, throat: no nasal congestion, no sore throat, no earache, no decrease in hearing, no hoarseness, no dry mouth, no sinus problems, no difficulty swallowing, no neck lumps, no dental problems, no mouth sores, no ringing in ears  Pulmonary: no shortness of breath, no wheezing, no dyspnea on exertion, no cough  Cardiovascular: no chest pain, no lower extremity edema, no orthopnea, no intermittent leg claudication, no palpitations  Gastrointestinal: no abdominal pain, no nausea, no vomiting, no diarrhea, no constipation, no dysphagia, no heartburn, no bloating  Genitourinary: no dysuria, no urinary incontinence, no urinary hesitancy, no urinary frequency, no feelings of urinary urgency, no nocturia  Musculoskeletal: no joint swelling, no joint stiffness, no joint pain, no muscle cramps, no muscle pain, no bone pain, no fractures  Integument/Breast:  no skin rashes, no skin lesions, no itching, no dry skin  Neurological: Has numbness, has tingling, no weakness, no confusion, no headaches, no dizziness, no fainting, no tremors, no decrease in memory, no balance problems  Psychiatric: no anxiety, no depression, no insomnia  Hematologic/Lymphatic: no tendency for easy bleeding, no swollen lymph nodes, no tendency for easy bruising  Immunology: has seasonal allergies, no frequent infections, no frequent illnesses  Endocrine: no temperature intolerance     OBJECTIVE:  /63   Pulse 67   Resp 14   Ht 5' 11\" (1.803 m)   Wt 260 lb (117.9 kg)   SpO2 98%   BMI 36.26 kg/m²      Wt Readings from Last 3 Encounters:   12/09/20 260 lb (117.9 kg)   09/09/20 259 lb (117.5 kg)   07/01/20 256 lb (116.1 kg)       Constitutional: no acute distress, well appearing and well nourished  Psychiatric: oriented to person, place and time, judgement and insight and normal, recent and remote memory and intact and mood and affect are normal  Skin: skin and subcutaneous tissue is normal without mass, normal turgor  Head and Face: examination of head and face revealed no abnormalities  Eyes: no lid or conjunctival swelling, erythema or discharge, pupils are normal, equal, round, reactive to light  Ears/Nose: external inspection of ears and nose revealed no abnormalities, hearing is grossly normal  Oropharynx/Mouth/Face: lips, tongue and gums are normal with no lesions, the voice quality was normal  Neck: neck is supple and symmetric, with midline trachea and no masses, thyroid left lobe absent, right lobe normal  Lymphatics: normal cervical lymph nodes, normal supraclavicular nodes  Pulmonary: no increased work of breathing or signs of respiratory distress, lungs are clear to auscultation  Cardiovascular: normal heart rate and rhythm, normal S1 and S2, no murmurs and pedal pulses and 2+ bilaterally, No edema  Abdomen: abdomen is soft, non-tender with no masses  Musculoskeletal: normal gait and station and exam of the digits and nails are normal  Neurological: normal coordination and normal general cortical function    ASSESSMENT/PLAN:    1. Type 2 diabetes, uncontrolled, with neuropathy (HCC)  Worsening, patient is undergoing a lot of stress, which affects his blood glucose control  Hemoglobin A1c 10.1-7.8-8.5-8.2-7.6-8.7  Patient states he is eating better. On low carb diet. Exercises. - continue medications   - Lantus  50 units bid  - Humalog 25 units qac plus correction 2 units for every 50 of BG above 150 qac    2. Acquired hypothyroidism  - Levothyroxine 0.1 mg   - TSH 0.68-1.15-0.55    3. Essential hypertension  Same medications  Hypertension well controlled     4. Mixed hyperlipidemia  LDL 84-50-45-58  -[id number]  - Atorvastatin   - LDL well controlled     5.  Vitamin D deficiency  25OH vit D 45-52-65.8-64.1  Weekly Supplement  - Vitamin D levels within normal limits     6. Obesity  Diet, exercise    7. Elevated creatinine  Follow-up  Creatinine 1.5 -1.1-1.2  See family doctor for evaluation. Reviewed and/or ordered clinical lab results Yes  Reviewed and/or ordered radiology tests Yes  Reviewed and/or ordered other diagnostic tests No  Discussed test results with performing physician No  Independently reviewed image, tracing, or specimen No  Made a decision to obtain old records No  Reviewed old records Yes  Obtained history from other than patient No    Joanne Fernandez MD was counseled regarding symptoms of diabetes diagnosis, course and complications of disease if inadequately treated, side effects of medications, diagnosis, treatment options, and prognosis, risks, benefits, complications, and alternatives of treatment, labs, imaging and other studies and treatment targets and goals. He understands instructions and counseling. Return in about 4 months (around 4/9/2021) for diabetes.

## 2020-12-10 PROBLEM — R79.89 ELEVATED SERUM CREATININE: Status: ACTIVE | Noted: 2020-12-10

## 2020-12-11 ENCOUNTER — OFFICE VISIT (OUTPATIENT)
Dept: PRIMARY CARE CLINIC | Age: 70
End: 2020-12-11
Payer: MEDICARE

## 2020-12-11 PROCEDURE — 99211 OFF/OP EST MAY X REQ PHY/QHP: CPT | Performed by: NURSE PRACTITIONER

## 2020-12-11 NOTE — PATIENT INSTRUCTIONS

## 2020-12-11 NOTE — PROGRESS NOTES
Juanita Rincon MD received a viral test for COVID-19. They were educated on isolation and quarantine as appropriate. For any symptoms, they were directed to seek care from their PCP, given contact information to establish with a doctor, directed to an urgent care or the emergency room.

## 2020-12-12 LAB — SARS-COV-2, NAA: NOT DETECTED

## 2020-12-16 ENCOUNTER — TELEPHONE (OUTPATIENT)
Dept: SURGERY | Age: 70
End: 2020-12-16

## 2020-12-17 ENCOUNTER — PROCEDURE VISIT (OUTPATIENT)
Dept: SURGERY | Age: 70
End: 2020-12-17
Payer: MEDICARE

## 2020-12-17 VITALS — HEART RATE: 75 BPM | DIASTOLIC BLOOD PRESSURE: 59 MMHG | SYSTOLIC BLOOD PRESSURE: 99 MMHG | TEMPERATURE: 97.7 F

## 2020-12-17 PROCEDURE — 17311 MOHS 1 STAGE H/N/HF/G: CPT | Performed by: DERMATOLOGY

## 2020-12-17 PROCEDURE — 12051 INTMD RPR FACE/MM 2.5 CM/<: CPT | Performed by: DERMATOLOGY

## 2020-12-17 NOTE — PROGRESS NOTES
MOHS PROCEDURE NOTE    PHYSICIAN:  Abrahan Tena. Gary Gregory MD    ASSISTANT: Kamran Obrien LPN     REFERRING PROVIDER:   Rm Jett MD    PREOPERATIVE DIAGNOSIS: Nodular Basal Cell Carcinoma     SPECIFIC MOHS INDICATIONS:  location    AUC SCORIN/9    POSTOPERATIVE DIAGNOSIS: SAME    LOCATION: Left nasal dorsum    OPERATIVE PROCEDURE:  MOHS MICROGRAPHIC SURGERY    RECONSTRUCTION OF DEFECT: Intermediate layered closure    PREOPERATIVE SIZE: 5x5 MM    DEFECT SIZE: 7x6 MM    LENGTH OF REPAIRED WOUND/SIZE OF FLAP/SIZE OF GRAFT:  16 MM    ANESTHESIA:  4.5mL 1% lidocaine with epinephrine 1:100,000 buffered. EBL:  MINIMAL    DURATION OF PROCEDURE:  1 HOUR    POSTOPERATIVE OBSERVATION: 1 HOUR    SPECIMENS:  SEE MOHS MAP    COMPLICATIONS:  NONE    DESCRIPTION OF PROCEDURE:  The patient was given a mirror, as appropriate, and the biopsy site was identified, marked with a surgical marking pen, and verified by the patient. Options for treatment were discussed and the patient was informed that Mohs surgery was the selected treatment based on its lower recurrence rate, given the features listed above, as compared to other treatment modalities such as excision, radiation, or curettage, and agreed with this treatment plan. Risks and benefits including bruising, swelling, bleeding, infection, nerve injury, recurrence, and scarring were discussed with the patient prior to the procedure and a written consent detailing these and other risks was reviewed with the patient and signed. There was a time out for person and procedure verification. The surgical site was prepped with an antiseptic solution. Application of an antiseptic solution was repeated before each surgical stage. Stage I:  The clinically-apparent tumor was carefully defined and debulked, determining the edge of the surgical excision. A thin layer of tumor-laden tissue was excised with a narrow margin of normal-appearing skin, using the technique of Mohs. A map was prepared to correspond to the area of skin from which it was excised. Hemostasis was achieved using electrosurgery. The wound was bandaged. The tissue was prepared for the cryostat and sectioned. 1 section(s) prepared. Each section was coded, cut, and stained for microscopic examination. The entire base and margins of the excised piece of tissue were examined by the surgeon. The tissue was examined to the level of subcut fat. No tumor was identified at the peripheral margins of stage I of microscopically controlled surgery. DEFECT MANAGEMENT:    REPAIR DESCRIPTION:  Various closure modalities were discussed with the patient, and it was decided that an intermediate layered repair would best preserve normal anatomic and functional relationships. Additional risk of wound dehiscence was discussed. The area was anesthetized with 1% lidocaine with epinephrine 1:100,000 buffered, was given a sterile prep using Chlorhexidine gluconate 4% solution and draped in the usual sterile fashion. Recreation and enlargement of the wound was performed by excising cones of tissue via the triangulation technique. The final incision lines were placed with respect for the patient's natural skin tension lines in a linear configuration to avoid functional and aesthetic distortion of adjacent free margins. Following minimal undermining, meticulous hemostasis was obtained with spot monopolar electrocoagulation. Subcutaneous dead space and dermis were closed using 5-0 Vicryl buried subcutaneous interrupted suture and the epidermis was approximated with 5-0 Fast absorbing gut running epidermal sutures. WOUND COVERAGE:  The wound was cleaned with normal saline solution, dried off, Aquaphor ointment was applied, and the wound was covered. A dressing was applied for stabilization and light pressure. The patient was given detailed oral and written instructions on postoperative care. There were no complications. The patient left the Unit in good medical condition. FOLLOW-UP:  As dissolving sutures were placed, the patient was asked to return if any questions or concerns arose, but otherwise will return to see general dermatology per their instructions.

## 2020-12-17 NOTE — PATIENT INSTRUCTIONS
[facility] Office Hours:    Monday-Thursday  7:30 AM-4:30 PM    Friday  9:00 AM-1:00 PM    Holiday Hours: Our staff would like to inform you of the changes of office hours during the holiday season. The following dates will differ from our regular office hours. 11/26/20 - 11/27/20 -  Thanksgiving. Office Closed. 12/24/20 - 12/25/20 - Christmas. Office Closed. 12/31/20 - 1/1/2021 - New Years. Office Closed. POST-OPERATIVE CARE FOR DISSOLVING STICHES  Bandage change after 48 hours    During your procedure today, dissolving sutures, or stiches, were used to close the wound. You do not have to have stiches removed. They will dissolve (melt away) on their own. Please follow these instructions to help you recover from your procedure and help your wound heal.    CARING FOR YOUR SURGICAL SITE  The bandage should remain on and completely dry for 48 hours. Do NOT get the bandage wet. 1. After the first 48 hours, gently remove the remaining part of the bandage. It can be helpful to moisten the bandage edges in the shower. Steri strips may still be on the wound. It is ok, they will fall off slowly with the daily bandage changes. 2. Gently clean AROUND the wound daily with mild soap and water. Please avoid the area from getting wet. The more moisture is on the sutures the quicker they will dissolve. 3. Dry (pat) the area with a clean Q-tip or gauze. Try to clean off any debris or crust from the area. 4. Apply a layer of Vaseline/ Aquaphor (or Bacitracin if your doctor recommends) to the wound area only. 5. Cut a piece of Telfa (or any non-stick dressing) to fit just over the wound and secure it with paper tape. If the wound is small you may use a Band- Aid. Keep area covered for a total of 1 week(s). If the dressing comes off or if you have questions, or concerns about the dressing, please call the office for instructions!     POST OPERATIVE INSTRUCTIONS 4. \"Spitting\" suture. Occasionally, an inside suture (stitch) does not completely dissolve. When this happens, (generally 4-8 weeks after surgery), it causes a bump or \"pimple\" to form on the scar. This is easily removed and is not at all serious. It does not mean the skin cancer has returned. Contact us if it happens, but do not be alarmed. Vitamin E oil is NOT necessary. A good moisturizer is just as effective. Sunscreen IS necessary. Use at least and SPF 30 sunscreen daily- even in winter    Call us at [phone] right away if you have any of the following symptoms:  -Bleeding that you can not stop (see highlighted area above). -Pain that lasts longer than 48 hours.  -Your wound becomes more painful, red or hot. -Bruising and swelling that does not begin to improve within the 48 hours or gets worse suddenly.

## 2020-12-18 ENCOUNTER — TELEPHONE (OUTPATIENT)
Dept: SURGERY | Age: 70
End: 2020-12-18

## 2020-12-18 NOTE — TELEPHONE ENCOUNTER
The patient was in the office on 12/17/20 for mohs located on the L nasal dorsum with ILC repair. The patient tolerated the procedure well and left the office in good condition. A post-operative telephone call was placed at 9:35AM in order to check on the patient's recovery process. The patient was not able to be reached and a phone message was left.

## 2020-12-28 NOTE — TELEPHONE ENCOUNTER
Requested Prescriptions     Pending Prescriptions Disp Refills    levothyroxine (SYNTHROID) 100 MCG tablet [Pharmacy Med Name: LEVOTHYROXINE 100MCG TAB  Tablet] 90 tablet 0     Si TABLET BY MOUTH ONCE DAILY (PRESCRIBER OK NEEDED FOR NEXT REFILL)    LANTUS SOLOSTAR 100 UNIT/ML injection pen [Pharmacy Med Name: [facility]el Nicholas 100 UNITS/M 100 Solution Pen-injector] 30 mL 3     Sig: INJECT 50 UNITS SUBCUTANEOUSLY 2 TIMES A DAY (PRESCRIBER OK NEEDED FOR NEXT REFILL)         LAST OV: 2020  LAST REFILL: 2020, 3/11/2020  NEXT OV: 2021

## 2020-12-31 RX ORDER — INSULIN GLARGINE 100 [IU]/ML
INJECTION, SOLUTION SUBCUTANEOUS
Qty: 20 PEN | Refills: 3 | Status: SHIPPED | OUTPATIENT
Start: 2020-12-31 | End: 2021-01-04 | Stop reason: SDUPTHER

## 2020-12-31 RX ORDER — LEVOTHYROXINE SODIUM 0.1 MG/1
TABLET ORAL
Qty: 90 TABLET | Refills: 1 | Status: SHIPPED | OUTPATIENT
Start: 2020-12-31 | End: 2021-06-24

## 2021-01-04 ENCOUNTER — OFFICE VISIT (OUTPATIENT)
Dept: INTERNAL MEDICINE CLINIC | Age: 71
End: 2021-01-04
Payer: MEDICARE

## 2021-01-04 VITALS
HEART RATE: 66 BPM | HEIGHT: 71 IN | TEMPERATURE: 96.6 F | WEIGHT: 260 LBS | BODY MASS INDEX: 36.4 KG/M2 | SYSTOLIC BLOOD PRESSURE: 138 MMHG | DIASTOLIC BLOOD PRESSURE: 62 MMHG

## 2021-01-04 DIAGNOSIS — M1A.9XX0 CHRONIC GOUT WITHOUT TOPHUS, UNSPECIFIED CAUSE, UNSPECIFIED SITE: ICD-10-CM

## 2021-01-04 DIAGNOSIS — E29.1 PRIMARY MALE HYPOGONADISM: ICD-10-CM

## 2021-01-04 DIAGNOSIS — I10 ESSENTIAL HYPERTENSION: ICD-10-CM

## 2021-01-04 DIAGNOSIS — E11.42 DIABETIC POLYNEUROPATHY ASSOCIATED WITH TYPE 2 DIABETES MELLITUS (HCC): ICD-10-CM

## 2021-01-04 DIAGNOSIS — I73.9 PVD (PERIPHERAL VASCULAR DISEASE) (HCC): ICD-10-CM

## 2021-01-04 DIAGNOSIS — Z12.11 ENCOUNTER FOR SCREENING COLONOSCOPY: ICD-10-CM

## 2021-01-04 DIAGNOSIS — E03.9 ACQUIRED HYPOTHYROIDISM: ICD-10-CM

## 2021-01-04 PROCEDURE — 99214 OFFICE O/P EST MOD 30 MIN: CPT | Performed by: INTERNAL MEDICINE

## 2021-01-04 RX ORDER — OMEGA-3-ACID ETHYL ESTERS 1 G/1
2 CAPSULE, LIQUID FILLED ORAL 2 TIMES DAILY
Qty: 360 CAPSULE | Refills: 3 | Status: SHIPPED | OUTPATIENT
Start: 2021-01-04 | End: 2022-06-10

## 2021-01-04 RX ORDER — AMLODIPINE BESYLATE 10 MG/1
TABLET ORAL
Qty: 90 TABLET | Refills: 3 | Status: SHIPPED | OUTPATIENT
Start: 2021-01-04 | End: 2022-02-01

## 2021-01-04 RX ORDER — INSULIN LISPRO 200 [IU]/ML
INJECTION, SOLUTION SUBCUTANEOUS
Qty: 20 PEN | Refills: 3 | Status: SHIPPED | OUTPATIENT
Start: 2021-01-04 | End: 2022-01-10

## 2021-01-04 RX ORDER — INSULIN GLARGINE 100 [IU]/ML
INJECTION, SOLUTION SUBCUTANEOUS
Qty: 60 PEN | Refills: 3 | Status: SHIPPED | OUTPATIENT
Start: 2021-01-04 | End: 2021-09-01

## 2021-01-04 RX ORDER — ALLOPURINOL 300 MG/1
TABLET ORAL
Qty: 90 TABLET | Refills: 3 | Status: SHIPPED | OUTPATIENT
Start: 2021-01-04 | End: 2022-01-10

## 2021-01-04 RX ORDER — POTASSIUM CITRATE 10 MEQ/1
TABLET, EXTENDED RELEASE ORAL
Qty: 90 TABLET | Refills: 3 | Status: SHIPPED | OUTPATIENT
Start: 2021-01-04 | End: 2022-01-10

## 2021-01-04 ASSESSMENT — ENCOUNTER SYMPTOMS
SHORTNESS OF BREATH: 0
WHEEZING: 0

## 2021-01-04 NOTE — PROGRESS NOTES
Xu Lopez MD  1950  male  69 y.o. SUBJECTIVE:       Chief Complaint   Patient presents with    6 Month Follow-Up       HPI:  Follow-up visit for chronic problem. Patient has been following up with endocrinology for diabetes mellitus. He gets hypoglycemia about once a month. He follows with ophthalmologist regularly. History of diabetic neuropathy. He denies any resting leg pain. Primary hypogonadism gonad is him. He continues to use testosterone supplements. Last PSA was normal.  Last hemoglobin around 14. History of recurrent renal stones because of high uric acid. No further episode of gout attack while on allopurinol. Patient denies chest pain palpitation dizziness or exertional dyspnea. Past Medical History:   Diagnosis Date    Hearing loss     Hypogonadism in male     Hypothyroidism     Kidney stones     MAO (obstructive sleep apnea)     Type II or unspecified type diabetes mellitus without mention of complication, not stated as uncontrolled      Past Surgical History:   Procedure Laterality Date    CATARACT REMOVAL WITH IMPLANT Bilateral     CHOLECYSTECTOMY      9/11    COLONOSCOPY      2009    THYROIDECTOMY      lt lobectomy 2/2010    TONSILLECTOMY       Social History     Socioeconomic History    Marital status:      Spouse name: None    Number of children: None    Years of education: None    Highest education level: None   Occupational History    Occupation: retired pediatrician   Social Needs    Financial resource strain: None    Food insecurity     Worry: None     Inability: None    Transportation needs     Medical: None     Non-medical: None   Tobacco Use    Smoking status: Never Smoker    Smokeless tobacco: Never Used   Substance and Sexual Activity    Alcohol use:  Yes     Alcohol/week: 1.0 standard drinks     Types: 1 Cans of beer per week     Comment: 4 of 7 days     Drug use: No    Sexual activity: Never   Lifestyle    Physical activity Days per week: None     Minutes per session: None    Stress: None   Relationships    Social connections     Talks on phone: None     Gets together: None     Attends Rastafarian service: None     Active member of club or organization: None     Attends meetings of clubs or organizations: None     Relationship status: None    Intimate partner violence     Fear of current or ex partner: None     Emotionally abused: None     Physically abused: None     Forced sexual activity: None   Other Topics Concern    None   Social History Narrative    None     Family History   Problem Relation Age of Onset    Cancer Father         skin    Coronary Art Dis Father     Diabetes Father     Coronary Art Dis Paternal Uncle     Diabetes Paternal Uncle     Stroke Paternal Uncle     Heart Disease Neg Hx     High Blood Pressure Neg Hx     Osteoporosis Neg Hx     Thyroid Disease Neg Hx     Heart Attack Neg Hx        Review of Systems   Constitutional: Negative for diaphoresis and fatigue. Respiratory: Negative for shortness of breath and wheezing. Cardiovascular: Negative for chest pain and palpitations. OBJECTIVE:  Pulse Readings from Last 4 Encounters:   01/04/21 66   12/17/20 75   12/09/20 67   09/09/20 82     Wt Readings from Last 4 Encounters:   01/04/21 260 lb (117.9 kg)   12/09/20 260 lb (117.9 kg)   09/09/20 259 lb (117.5 kg)   07/01/20 256 lb (116.1 kg)     BP Readings from Last 4 Encounters:   01/04/21 138/62   12/17/20 (!) 99/59   12/09/20 118/63   09/09/20 (!) 96/54     Physical Exam  Constitutional:       Appearance: He is obese. Neck:      Vascular: No carotid bruit. Cardiovascular:      Rate and Rhythm: Normal rate and regular rhythm. Pulses: Normal pulses. Heart sounds: Normal heart sounds. Musculoskeletal:      Right lower leg: No edema. Left lower leg: No edema. Skin:     Comments: Superficial fissure at the both heels. There is a horizontal fissure.   There does not appear to be any palpable foreign body. Patient denies any injury no evidence of local infection. Decreased monofilament sensation to both feet. Decreased dorsalis pedis as well as posterior tibialis pulse. Neurological:      General: No focal deficit present. Mental Status: He is alert and oriented to person, place, and time. CBC:   Lab Results   Component Value Date    WBC 8.0 07/13/2020    HGB 14.0 07/13/2020    HCT 43.0 07/13/2020     07/13/2020     CMP:  Lab Results   Component Value Date     12/07/2020    K 4.4 12/07/2020     12/07/2020    CO2 24 12/07/2020    ANIONGAP 12 12/07/2020    GLUCOSE 110 12/07/2020    BUN 24 12/07/2020    CREATININE 1.2 12/07/2020    GFRAA >60 12/07/2020    GFRAA >60 01/19/2013    CALCIUM 9.9 12/07/2020    PROT 7.5 12/07/2020    PROT 7.5 02/16/2013    LABALBU 4.4 12/07/2020    AGRATIO 1.4 12/07/2020    BILITOT 0.4 12/07/2020    ALKPHOS 103 12/07/2020    ALT 34 12/07/2020    AST 24 12/07/2020    GLOB 3.1 12/07/2020     URINALYSIS:  Lab Results   Component Value Date    LABMICR <1.20 12/07/2020     HBA1C:   Lab Results   Component Value Date    LABA1C 8.7 12/07/2020    .0 12/07/2020     MICRO/ALB:   Lab Results   Component Value Date    LABMICR <1.20 12/07/2020    LABCREA 131.2 12/07/2020    MALBCR see below 12/07/2020     LIPID:  Lab Results   Component Value Date    CHOL 129 12/07/2020    TRIG 213 12/07/2020    HDL 28 12/07/2020    HDL 27 03/17/2012    LDLCALC 58 12/07/2020    LABVLDL 43 12/07/2020     TSH:   Lab Results   Component Value Date    TSHREFLEX 0.93 07/01/2020     PSA:   Lab Results   Component Value Date    PSA 2.54 07/01/2020        ASSESSMENT/PLAN:  Assessment/Plan:  CHRISTOPHER GASPAR was seen today for 6 month follow-up.     Diagnoses and all orders for this visit:    PVD (peripheral vascular disease) ([facility] [address]  Need to exclude-     VL LOWER EXTREMITY ARTERIAL SEGMENTAL PRESSURES W PPG; Future    Essential hypertension  Continue diet lifestyle changes. Continue benazepril  -     potassium citrate (UROCIT-K) 10 MEQ (1080 MG) extended release tablet; 1 TABLET ONCE DAILY (PRESCRIBER OK IS NEEDED FOR NEXT FILL)  -     amLODIPine (NORVASC) 10 MG tablet; 1 TABLET BY MOUTH ONCE DAILY ([facility]) (PRESCRIBER OK IS NEEDED FOR NEXT FILL)    Acquired hypothyroidism  Continue current levothyroxine. Primary male hypogonadism  Continue current testosterone. Diabetic polyneuropathy associated with type 2 diabetes mellitus (HCC)  -     omega-3 acid ethyl esters (LOVAZA) 1 g capsule; Take 2 capsules by mouth 2 times daily  Continue atorvastatin. Chronic gout without tophus, unspecified cause, unspecified site  -     allopurinol (ZYLOPRIM) 300 MG tablet; TAKE 1 TABLET ONCE DAILY * (PRESCRIBER OK IS NEEDED FOR NEXT FILL)    Encounter for screening colonoscopy  Last colonoscopy 5 years ago.   -     Amb External Referral To Gastroenterology            Orders Placed This Encounter   Procedures    VL LOWER EXTREMITY ARTERIAL SEGMENTAL PRESSURES W PPG     Standing Status:   Future     Standing Expiration Date:   1/4/2022    Amb External Referral To Gastroenterology     Referral Priority:   Urgent     Referral Type:   Consult for Advice and Opinion     Referral Reason:   Specialty Services Required     Referred to Provider:   Clary Jalloh MD     Requested Specialty:   Gastroenterology     Number of Visits Requested:   1     Current Outpatient Medications   Medication Sig Dispense Refill    potassium citrate (UROCIT-K) 10 MEQ (1080 MG) extended release tablet 1 TABLET ONCE DAILY (PRESCRIBER OK IS NEEDED FOR NEXT FILL) 90 tablet 3    omega-3 acid ethyl esters (LOVAZA) 1 g capsule Take 2 capsules by mouth 2 times daily 360 capsule 3    amLODIPine (NORVASC) 10 MG tablet 1 TABLET BY MOUTH ONCE DAILY (NORVASC GENERIC) (PRESCRIBER OK IS NEEDED FOR NEXT FILL) 90 tablet 3    allopurinol (ZYLOPRIM) 300 MG tablet TAKE 1 TABLET ONCE DAILY * (PRESCRIBER OK IS NEEDED FOR NEXT FILL) 90 tablet 3    insulin glargine (LANTUS SOLOSTAR) 100 UNIT/ML injection pen INJECT 50 UNITS SUBCUTANEOUSLY 2 TIMES A DAY (PRESCRIBER OK NEEDED FOR NEXT REFILL) 60 pen 3    insulin lispro (HUMALOG KWIKPEN) 200 UNIT/ML SOPN pen INJECT 25 UNITS BEFORE MEALS plus correction 2 units for every 50 of BG above 150 qac 20 pen 3    levothyroxine (SYNTHROID) 100 MCG tablet 1 TABLET BY MOUTH ONCE DAILY (PRESCRIBER OK NEEDED FOR NEXT REFILL) 90 tablet 1    Dulaglutide (TRULICITY) 1.5 CZ/7.3DJ SOPN INJECT 1.5MG AS DIRECTED ONCE EACH WEEK (PRESCRIBER OK IS NEEDED FOR NEXT FILL) *REFRIGERATE** 2 mL 3    JARDIANCE 25 MG tablet 1 TABLET BY MOUTH ONCE DAILY (PRESCRIBER OK IS NEEDED FOR NEXT FILL) 30 tablet 5    benazepril (LOTENSIN) 40 MG tablet Take 1 tablet by mouth daily 90 tablet 3    vitamin D (ERGOCALCIFEROL) 1.25 MG (91137 UT) CAPS capsule 1 CAPSULE EVERY WEEK **MAY REFILL** 12 capsule 3    atorvastatin (LIPITOR) 10 MG tablet Take 1 tablet by mouth daily 90 tablet 1    Turmeric 500 MG CAPS Take 1,000 mg by mouth daily      UNABLE TO FIND CPAP supplies, water reservoir and tubing. Dx 327.23 1 each 0    aspirin EC 81 MG EC tablet Take 1 tablet by mouth daily. 30 tablet 11    Multiple Vitamin (MULTIVITAMIN PO) Take  by mouth daily.       Testosterone (ANDROGEL) 20.25 MG/ACT (1.62%) GEL gel APPLY 2 PUMPS TO SKIN ONCE DAILY AS DIRECTED **MAY REFILL** 75 g 3    Insulin Pen Needle (B-D UF III MINI PEN NEEDLES) 31G X 5 MM MISC USE 6 TIMES PER DAY OR AS DIRECTED FOR LANTUS - HUMALOG (PRESCRIBER OK IS NEEDED FOR NEXT FILL) 200 each 11    Continuous Blood Gluc Sensor (FREESTYLE ALBA 14 DAY SENSOR) MISC USE 1 SENSOR EVERY 14 DAYS (PRESCRIBER OK NEEDED FOR NEXT REFILL) 2 each 5    blood glucose test strips (ONE TOUCH ULTRA TEST) strip qid 400 strip 3    Continuous Blood Gluc  (FREESTYLE ALBA READER) DELMIS 1 Units by Does not apply route as needed (as needed) 1 Device 0    Blood Glucose Monitoring Suppl (ONE TOUCH ULTRA SYSTEM KIT) W/DEVICE KIT AS DIRECTED 1 kit 0     No current facility-administered medications for this visit. Return in about 6 months (around 7/4/2021). An After Visit Summary was printed and given to the patient. Documentation was done using voice recognition dragon software. Every effort was made to ensure accuracy; however, inadvertent  Unintentional computerized transcription errors may be present.

## 2021-01-04 NOTE — PATIENT INSTRUCTIONS
[facility] Scheduling  [phone], option 2, then option 1    Dr Brissa Muhammad  [address]   [facility] [address]     (F) [phone]   (Y) [phone]

## 2021-02-01 RX ORDER — DULAGLUTIDE 1.5 MG/.5ML
INJECTION, SOLUTION SUBCUTANEOUS
Qty: 2 ML | Refills: 3 | Status: SHIPPED | OUTPATIENT
Start: 2021-02-01 | End: 2021-05-20

## 2021-02-01 NOTE — TELEPHONE ENCOUNTER
Requested Prescriptions     Pending Prescriptions Disp Refills    TRULICITY 1.5 DV/2.2WE SOPN [Pharmacy Med Name: TRULICITY 1.5 KY/2.1 ML PEN 1.5 Solution Pen-injector] 2 mL 3     Sig: INJECT 1.5MG AS DIRECTED ONCE EACH WEEK (PRESCRIBER OK IS NEEDED FOR NEXT FILL) *REFRIGERATE**        LAST OV: 9/9/2020  NEXT OV: 4/14/2021

## 2021-02-15 ENCOUNTER — TELEPHONE (OUTPATIENT)
Dept: PULMONOLOGY | Age: 71
End: 2021-02-15

## 2021-02-15 NOTE — TELEPHONE ENCOUNTER
Attempted to reach the patient for their appointment.      Left message on the patients machine to complete or reschedule their appointment

## 2021-03-29 RX ORDER — FLASH GLUCOSE SENSOR
KIT MISCELLANEOUS
Qty: 6 EACH | Refills: 3 | Status: SHIPPED | OUTPATIENT
Start: 2021-03-29 | End: 2022-03-09

## 2021-04-08 DIAGNOSIS — E55.9 VITAMIN D DEFICIENCY: ICD-10-CM

## 2021-04-08 RX ORDER — ERGOCALCIFEROL 1.25 MG/1
CAPSULE ORAL
Qty: 12 CAPSULE | Refills: 3 | Status: SHIPPED | OUTPATIENT
Start: 2021-04-08 | End: 2022-01-06 | Stop reason: SDUPTHER

## 2021-05-20 DIAGNOSIS — E29.1 TESTOSTERONE DEFICIENCY IN MALE: ICD-10-CM

## 2021-05-20 RX ORDER — DULAGLUTIDE 1.5 MG/.5ML
INJECTION, SOLUTION SUBCUTANEOUS
Qty: 2 ML | Refills: 3 | Status: SHIPPED | OUTPATIENT
Start: 2021-05-20 | End: 2021-06-09 | Stop reason: DRUGHIGH

## 2021-05-20 RX ORDER — TESTOSTERONE 16.2 MG/G
GEL TRANSDERMAL
Qty: 75 G | Refills: 3 | Status: SHIPPED | OUTPATIENT
Start: 2021-05-20 | End: 2022-02-16

## 2021-05-20 NOTE — TELEPHONE ENCOUNTER
Requested Prescriptions     Pending Prescriptions Disp Refills    TRULICITY 1.5 MR/5.3TH SOPN [Pharmacy Med Name: TRULICITY 1.5 GB/8.1 ML PEN 1.5 Solution Pen-injector] 2 mL 3     Sig: INJECT 1.5MG AS DIRECTED ONCE EACH WEEK (PRESCRIBER OK IS NEEDED FOR NEXT FILL) *REFRIGERATE**       Last OV 12/9/20  Next OV 6/9/21  Last A1c 12/7/20  Last fill 2/1/21

## 2021-05-24 ENCOUNTER — TELEPHONE (OUTPATIENT)
Dept: ADMINISTRATIVE | Age: 71
End: 2021-05-24

## 2021-06-08 ENCOUNTER — HOSPITAL ENCOUNTER (OUTPATIENT)
Age: 71
Discharge: HOME | End: 2021-06-08
Payer: MEDICARE

## 2021-06-08 DIAGNOSIS — E03.9 ACQUIRED HYPOTHYROIDISM: ICD-10-CM

## 2021-06-08 DIAGNOSIS — E66.01 CLASS 2 SEVERE OBESITY WITH SERIOUS COMORBIDITY AND BODY MASS INDEX (BMI) OF 36.0 TO 36.9 IN ADULT, UNSPECIFIED OBESITY TYPE (HCC): ICD-10-CM

## 2021-06-08 DIAGNOSIS — E55.9 VITAMIN D DEFICIENCY: ICD-10-CM

## 2021-06-08 DIAGNOSIS — I10 ESSENTIAL HYPERTENSION: Chronic | ICD-10-CM

## 2021-06-08 DIAGNOSIS — E78.2 MIXED HYPERLIPIDEMIA: ICD-10-CM

## 2021-06-08 LAB
A/G RATIO: 1.7 (ref 1.1–2.2)
ALBUMIN SERPL-MCNC: 4.5 G/DL (ref 3.4–5)
ALP BLD-CCNC: 93 U/L (ref 40–129)
ALT SERPL-CCNC: 32 U/L (ref 10–40)
ANION GAP SERPL CALCULATED.3IONS-SCNC: 12 MMOL/L (ref 3–16)
AST SERPL-CCNC: 29 U/L (ref 15–37)
BILIRUB SERPL-MCNC: 0.3 MG/DL (ref 0–1)
BUN BLDV-MCNC: 22 MG/DL (ref 7–20)
CALCIUM SERPL-MCNC: 9.2 MG/DL (ref 8.3–10.6)
CHLORIDE BLD-SCNC: 102 MMOL/L (ref 99–110)
CHOLESTEROL, TOTAL: 140 MG/DL (ref 0–199)
CO2: 23 MMOL/L (ref 21–32)
CREAT SERPL-MCNC: 1.1 MG/DL (ref 0.8–1.3)
CREATININE URINE: 91.1 MG/DL (ref 39–259)
GFR AFRICAN AMERICAN: >60
GFR NON-AFRICAN AMERICAN: >60
GLOBULIN: 2.7 G/DL
GLUCOSE BLD-MCNC: 86 MG/DL (ref 70–99)
HDLC SERPL-MCNC: 27 MG/DL (ref 40–60)
LDL CHOLESTEROL CALCULATED: 64 MG/DL
MICROALBUMIN UR-MCNC: <1.2 MG/DL
MICROALBUMIN/CREAT UR-RTO: NORMAL MG/G (ref 0–30)
POTASSIUM SERPL-SCNC: 4 MMOL/L (ref 3.5–5.1)
SODIUM BLD-SCNC: 137 MMOL/L (ref 136–145)
T3 FREE: 2.4 PG/ML (ref 2.3–4.2)
T4 FREE: 1.3 NG/DL (ref 0.9–1.8)
TOTAL PROTEIN: 7.2 G/DL (ref 6.4–8.2)
TRIGL SERPL-MCNC: 244 MG/DL (ref 0–150)
TSH SERPL DL<=0.05 MIU/L-ACNC: 1.91 UIU/ML (ref 0.27–4.2)
VITAMIN D 25-HYDROXY: 56.6 NG/ML
VLDLC SERPL CALC-MCNC: 49 MG/DL

## 2021-06-08 PROCEDURE — 84439 ASSAY OF FREE THYROXINE: CPT

## 2021-06-08 PROCEDURE — 82043 UR ALBUMIN QUANTITATIVE: CPT

## 2021-06-08 PROCEDURE — 84443 ASSAY THYROID STIM HORMONE: CPT

## 2021-06-08 PROCEDURE — 83036 HEMOGLOBIN GLYCOSYLATED A1C: CPT

## 2021-06-08 PROCEDURE — 80061 LIPID PANEL: CPT

## 2021-06-08 PROCEDURE — 36415 COLL VENOUS BLD VENIPUNCTURE: CPT

## 2021-06-08 PROCEDURE — 82570 ASSAY OF URINE CREATININE: CPT

## 2021-06-08 PROCEDURE — 80053 COMPREHEN METABOLIC PANEL: CPT

## 2021-06-08 PROCEDURE — 84481 FREE ASSAY (FT-3): CPT

## 2021-06-08 PROCEDURE — 82306 VITAMIN D 25 HYDROXY: CPT

## 2021-06-09 ENCOUNTER — OFFICE VISIT (OUTPATIENT)
Dept: ENDOCRINOLOGY | Age: 71
End: 2021-06-09
Payer: MEDICARE

## 2021-06-09 VITALS
WEIGHT: 260 LBS | OXYGEN SATURATION: 96 % | SYSTOLIC BLOOD PRESSURE: 111 MMHG | HEART RATE: 75 BPM | HEIGHT: 71 IN | DIASTOLIC BLOOD PRESSURE: 61 MMHG | BODY MASS INDEX: 36.4 KG/M2

## 2021-06-09 DIAGNOSIS — E78.2 MIXED HYPERLIPIDEMIA: ICD-10-CM

## 2021-06-09 DIAGNOSIS — R79.89 ELEVATED SERUM CREATININE: ICD-10-CM

## 2021-06-09 DIAGNOSIS — E55.9 VITAMIN D DEFICIENCY: ICD-10-CM

## 2021-06-09 DIAGNOSIS — E03.9 ACQUIRED HYPOTHYROIDISM: ICD-10-CM

## 2021-06-09 DIAGNOSIS — E66.01 CLASS 2 SEVERE OBESITY WITH SERIOUS COMORBIDITY AND BODY MASS INDEX (BMI) OF 36.0 TO 36.9 IN ADULT, UNSPECIFIED OBESITY TYPE (HCC): ICD-10-CM

## 2021-06-09 DIAGNOSIS — I10 ESSENTIAL HYPERTENSION: Chronic | ICD-10-CM

## 2021-06-09 LAB
ESTIMATED AVERAGE GLUCOSE: 203 MG/DL
HBA1C MFR BLD: 8.7 %

## 2021-06-09 PROCEDURE — 3052F HG A1C>EQUAL 8.0%<EQUAL 9.0%: CPT | Performed by: INTERNAL MEDICINE

## 2021-06-09 PROCEDURE — 99214 OFFICE O/P EST MOD 30 MIN: CPT | Performed by: INTERNAL MEDICINE

## 2021-06-09 RX ORDER — DULAGLUTIDE 3 MG/.5ML
3 INJECTION, SOLUTION SUBCUTANEOUS WEEKLY
Qty: 4 PEN | Refills: 5 | Status: SHIPPED | OUTPATIENT
Start: 2021-06-09 | End: 2022-01-12 | Stop reason: DRUGHIGH

## 2021-06-09 NOTE — PROGRESS NOTES
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 Current symptoms/problems include hyperglycemia and show no change. 1.  Type 2 diabetes, uncontrolled, with neuropathy (HCC) [E11.40, E11.65]    Diagnosed with Type 2 diabetes mellitus in 1994.     Comorbid conditions: hyperlipidemia, HTN and Neuropathy    Current diabetic medications include: Trulicity, Humalog 25 units 3 times daily, Lantus 50 mg twice daily, Jardiance 25 mg daily    Intolerance to diabetes medications: Metformin upset GI     Weight trend: stable  Prior visit with dietician: yes  Current diet: on average, 3 meals per day  Current exercise: Golf twice weekly, with walks daily, swimming twice a week     Current monitoring regimen: home blood tests - 4 times daily  Has brought blood glucose log/meter:  Yes  Home blood sugar records: fasting range: 105 - 135 and postprandial range:  130-140  Any episodes of hypoglycemia? None   Hypoglycemia frequency and time(s):  1-2 episodes per year   Does patient have Glucagon emergency kit? No  Does patient have rapid acting carbohydrate? Yes  Does patient wear a medic alert bracelet or necklace? No    2. Acquired hypothyroidism  Has fatigue. 3. Essential hypertension  No headaches. 4. Mixed hyperlipidemia  No muscle pain. 5. Vitamin D deficiency  Has fatigue. 6. Obesity  On diet, exercise    7. Elevated creatinine  Resolved      THYROID ULTRASOUND-       INDICATION- Type 2 diabetes, history of hypothyroidism. Patient is   status post left thyroid lobectomy.       FINDINGS-        The right thyroid lobe appears normal, measuring 4.9 x 1.4 x 1.6   cm. Thyroid parenchyma is mildly heterogeneous but no focal lesion   is detected. The left thyroid lobe is not visualized.       IMPRESSION-        1.  Mild heterogeneity of the right thyroid lobe with no focal   lesion detected.       2. Status post left thyroid lobectomy.            Read ByJaved Gunderson   Released Tray Rader        Released Date Time- 02/08/10 1137            - Elena Sandoval         Lab Results   Component Value Date    LABA1C 8.7 06/08/2021     Lab Results   Component Value Date    .0 06/08/2021           Past Medical History:   Diagnosis Date    Hearing loss     Hypogonadism in male     Hypothyroidism     Kidney stones     MAO (obstructive sleep apnea)     Type II or unspecified type diabetes mellitus without mention of complication, not stated as uncontrolled       Patient Active Problem List   Diagnosis    Diabetic polyneuropathy ([facility] [address]    Acquired hypothyroidism    Mixed hyperlipidemia    Primary male hypogonadism    Vitamin D deficiency    Type 2 diabetes, uncontrolled, with neuropathy ([facility] [address]    Essential hypertension    Obstructive sleep apnea syndrome    Class 2 severe obesity with serious comorbidity and body mass index (BMI) of 36.0 to 36.9 in adult [facility])    Elevated serum creatinine     Past Surgical History:   Procedure Laterality Date    CATARACT REMOVAL WITH IMPLANT Bilateral     CHOLECYSTECTOMY      9/11    COLONOSCOPY      2009    COLONOSCOPY  01/14/2021    ,Normal colonoscopy,Diverticulosis. repeat 5-7 years.  THYROIDECTOMY      lt lobectomy 2/2010    TONSILLECTOMY       Social History     Socioeconomic History    Marital status:      Spouse name: Not on file    Number of children: Not on file    Years of education: Not on file    Highest education level: Not on file   Occupational History    Occupation: retired pediatrician   Tobacco Use    Smoking status: Never Smoker    Smokeless tobacco: Never Used   Vaping Use    Vaping Use: Never used   Substance and Sexual Activity    Alcohol use:  Yes     Alcohol/week: 1.0 standard drinks     Types: 1 Cans of beer per week     Comment: 4 of 7 days     Drug use: No    Sexual activity: Never   Other Topics Concern    Not on file   Social History Narrative  Not on file     Social Determinants of Health     Financial Resource Strain:     Difficulty of Paying Living Expenses:    Food Insecurity:     Worried About Running Out of Food in the Last Year:     [address] in the Last Year:    Transportation Needs:     Lack of Transportation (Medical):      Lack of Transportation (Non-Medical):    Physical Activity:     Days of Exercise per Week:     Minutes of Exercise per Session:    Stress:     Feeling of Stress :    Social Connections:     Frequency of Communication with Friends and Family:     Frequency of Social Gatherings with Friends and Family:     Attends [employer] Services:     Active Member of Clubs or Organizations:     Attends Club or Organization Meetings:     Marital Status:    Intimate Partner Violence:     Fear of Current or Ex-Partner:     Emotionally Abused:     Physically Abused:     Sexually Abused:      Family History   Problem Relation Age of Onset    Cancer Father         skin    Coronary Art Dis Father     Diabetes Father     Coronary Art Dis Paternal Uncle     Diabetes Paternal Uncle     Stroke Paternal Uncle     Heart Disease Neg Hx     High Blood Pressure Neg Hx     Osteoporosis Neg Hx     Thyroid Disease Neg Hx     Heart Attack Neg Hx      Current Outpatient Medications   Medication Sig Dispense Refill    Dulaglutide (TRULICITY) 3 TQ/8.7WD SOPN Inject 3 mg into the skin once a week 4 pen 5    Testosterone (ANDROGEL) 20.25 MG/ACT (1.62%) GEL gel APPLY 2 PUMPS TO SKIN ONCE DAILY AS DIRECTED **MAY REFILL** 75 g 3    vitamin D (ERGOCALCIFEROL) 1.25 MG (98334 UT) CAPS capsule 1 CAPSULE EVERY WEEK 12 capsule 3    empagliflozin (JARDIANCE) 25 MG tablet Take 25 mg by mouth daily 30 tablet 11    Continuous Blood Gluc Sensor (FREESTYLE ALBA 14 DAY SENSOR) MISC USE 1 SENSOR EVERY 14 DAYS (PRESCRIBER OK IS NEEDED FOR NEXT FILL) 6 each 3    potassium citrate (UROCIT-K) 10 MEQ (1080 MG) extended release tablet 1 TABLET ONCE DAILY (PRESCRIBER OK IS NEEDED FOR NEXT FILL) 90 tablet 3    omega-3 acid ethyl esters (LOVAZA) 1 g capsule Take 2 capsules by mouth 2 times daily 360 capsule 3    amLODIPine (NORVASC) 10 MG tablet 1 TABLET BY MOUTH ONCE DAILY ([facility]) (PRESCRIBER OK IS NEEDED FOR NEXT FILL) 90 tablet 3    allopurinol (ZYLOPRIM) 300 MG tablet TAKE 1 TABLET ONCE DAILY * (PRESCRIBER OK IS NEEDED FOR NEXT FILL) 90 tablet 3    insulin glargine (LANTUS SOLOSTAR) 100 UNIT/ML injection pen INJECT 50 UNITS SUBCUTANEOUSLY 2 TIMES A DAY (PRESCRIBER OK NEEDED FOR NEXT REFILL) 60 pen 3    insulin lispro (HUMALOG KWIKPEN) 200 UNIT/ML SOPN pen INJECT 25 UNITS BEFORE MEALS plus correction 2 units for every 50 of BG above 150 qac 20 pen 3    atorvastatin (LIPITOR) 10 MG tablet TAKE ONE TABLET DAILY FOR CHOLESTEROL (GENERIC FOR LIPITOR) (PRESCRIBER OK IS NEEDED FOR NEXT FILL) 90 tablet 3    levothyroxine (SYNTHROID) 100 MCG tablet 1 TABLET BY MOUTH ONCE DAILY (PRESCRIBER OK NEEDED FOR NEXT REFILL) 90 tablet 1    Insulin Pen Needle (B-D UF III MINI PEN NEEDLES) 31G X 5 MM MISC USE 6 TIMES PER DAY OR AS DIRECTED FOR LANTUS - HUMALOG (PRESCRIBER OK IS NEEDED FOR NEXT FILL) 200 each 11    benazepril (LOTENSIN) 40 MG tablet Take 1 tablet by mouth daily 90 tablet 3    blood glucose test strips (ONE TOUCH ULTRA TEST) strip qid 400 strip 3    Continuous Blood Gluc  (FREESTYLE ALBA READER) DELMIS 1 Units by Does not apply route as needed (as needed) 1 Device 0    Turmeric 500 MG CAPS Take 1,000 mg by mouth daily      UNABLE TO FIND CPAP supplies, water reservoir and tubing. Dx 327.23 1 each 0    Blood Glucose Monitoring Suppl (ONE TOUCH ULTRA SYSTEM KIT) W/DEVICE KIT AS DIRECTED 1 kit 0    aspirin EC 81 MG EC tablet Take 1 tablet by mouth daily. 30 tablet 11    Multiple Vitamin (MULTIVITAMIN PO) Take  by mouth daily. No current facility-administered medications for this visit.      Allergies   Allergen Reactions    Demerol      vomiting    Metformin And Related      intolerant     Family Status   Relation Name Status    Father      Mother  Alive    PUnc  (Not Specified)    Neg Hx  (Not Specified)       Review of Systems  Constitutional: has minimal, no fever, no recent weight gain, no recent weight loss, no changes in appetite  Eyes: no eye pain, bilateral cataracts removal   Ears, nose, throat: no nasal congestion, no sore throat, no earache, no decrease in hearing, no hoarseness, no dry mouth, no sinus problems, no difficulty swallowing, no neck lumps, no dental problems, no mouth sores, no ringing in ears  Pulmonary: no shortness of breath, no wheezing, no dyspnea on exertion, no cough  Cardiovascular: no chest pain, no lower extremity edema, no orthopnea, no intermittent leg claudication, no palpitations  Gastrointestinal: no abdominal pain, no nausea, no vomiting, no diarrhea, no constipation, no dysphagia, no heartburn, no bloating  Genitourinary: no dysuria, no urinary incontinence, no urinary hesitancy, no urinary frequency, no feelings of urinary urgency, no nocturia  Musculoskeletal: no joint swelling, no joint stiffness, no joint pain, no muscle cramps, no muscle pain, no bone pain, no fractures  Integument/Breast:  no skin rashes, no skin lesions, no itching, no dry skin  Neurological: Has numbness, has tingling, no weakness, no confusion, no headaches, no dizziness, no fainting, no tremors, no decrease in memory, no balance problems  Psychiatric: no anxiety, no depression, no insomnia  Hematologic/Lymphatic: no tendency for easy bleeding, no swollen lymph nodes, no tendency for easy bruising  Immunology: has seasonal allergies, no frequent infections, no frequent illnesses  Endocrine: no temperature intolerance     OBJECTIVE:  /61   Pulse 75   Ht 5' 11\" (1.803 m)   Wt 260 lb (117.9 kg)   SpO2 96%   BMI 36.26 kg/m²      Wt Readings from Last 3 Encounters:   21 260 lb (117.9 kg) 01/04/21 260 lb (117.9 kg)   12/09/20 260 lb (117.9 kg)       Constitutional: no acute distress, well appearing and well nourished  Psychiatric: oriented to person, place and time, judgement and insight and normal, recent and remote memory and intact and mood and affect are normal  Skin: skin and subcutaneous tissue is normal without mass, normal turgor  Head and Face: examination of head and face revealed no abnormalities  Eyes: no lid or conjunctival swelling, erythema or discharge, pupils are normal, equal, round, reactive to light  Ears/Nose: external inspection of ears and nose revealed no abnormalities, hearing is grossly normal  Oropharynx/Mouth/Face: lips, tongue and gums are normal with no lesions, the voice quality was normal  Neck: neck is supple and symmetric, with midline trachea and no masses, thyroid left lobe absent, right lobe normal  Lymphatics: normal cervical lymph nodes, normal supraclavicular nodes  Pulmonary: no increased work of breathing or signs of respiratory distress, lungs are clear to auscultation  Cardiovascular: normal heart rate and rhythm, normal S1 and S2, no murmurs and pedal pulses and 2+ bilaterally, No edema  Abdomen: abdomen is soft, non-tender with no masses  Musculoskeletal: normal gait and station and exam of the digits and nails are normal  Neurological: normal coordination and normal general cortical function  Visual inspection: 9/2019 appointment with optho  Deformity/amputation: absent  Skin lesions/pre-ulcerative calluses: absent  Edema: right- negative, left- negative    Visual inspection:   Deformity/amputation: absent  Skin lesions/pre-ulcerative calluses: absent  Edema: right- negative, left- negative     Sensory exam:   Monofilament sensation: abnormal  (minimum of 5 random plantar locations tested, avoiding callused areas - > 1 area with absence of sensation is + for neuropathy)  High risk feet     Plus at least one of the

## 2021-06-29 ASSESSMENT — LIFESTYLE VARIABLES
AUDIT TOTAL SCORE: 4
HOW OFTEN DO YOU HAVE A DRINK CONTAINING ALCOHOL: 4
HOW OFTEN DURING THE LAST YEAR HAVE YOU HAD A FEELING OF GUILT OR REMORSE AFTER DRINKING: NEVER
HOW OFTEN DURING THE LAST YEAR HAVE YOU FAILED TO DO WHAT WAS NORMALLY EXPECTED FROM YOU BECAUSE OF DRINKING: 0
HOW OFTEN DO YOU HAVE SIX OR MORE DRINKS ON ONE OCCASION: 0
HOW OFTEN DURING THE LAST YEAR HAVE YOU NEEDED AN ALCOHOLIC DRINK FIRST THING IN THE MORNING TO GET YOURSELF GOING AFTER A NIGHT OF HEAVY DRINKING: 0
HOW OFTEN DURING THE LAST YEAR HAVE YOU HAD A FEELING OF GUILT OR REMORSE AFTER DRINKING: 0
HOW OFTEN DURING THE LAST YEAR HAVE YOU BEEN UNABLE TO REMEMBER WHAT HAPPENED THE NIGHT BEFORE BECAUSE YOU HAD BEEN DRINKING: 0
HOW OFTEN DO YOU HAVE A DRINK CONTAINING ALCOHOL: FOUR OR MORE TIMES A WEEK
HAS A RELATIVE, FRIEND, DOCTOR, OR ANOTHER HEALTH PROFESSIONAL EXPRESSED CONCERN ABOUT YOUR DRINKING OR SUGGESTED YOU CUT DOWN: NO
HOW OFTEN DURING THE LAST YEAR HAVE YOU NEEDED AN ALCOHOLIC DRINK FIRST THING IN THE MORNING TO GET YOURSELF GOING AFTER A NIGHT OF HEAVY DRINKING: NEVER
HOW MANY STANDARD DRINKS CONTAINING ALCOHOL DO YOU HAVE ON A TYPICAL DAY: 0
HOW OFTEN DURING THE LAST YEAR HAVE YOU BEEN UNABLE TO REMEMBER WHAT HAPPENED THE NIGHT BEFORE BECAUSE YOU HAD BEEN DRINKING: NEVER
HOW OFTEN DURING THE LAST YEAR HAVE YOU FOUND THAT YOU WERE NOT ABLE TO STOP DRINKING ONCE YOU HAD STARTED: NEVER
HOW OFTEN DO YOU HAVE SIX OR MORE DRINKS ON ONE OCCASION: NEVER
HOW OFTEN DURING THE LAST YEAR HAVE YOU FOUND THAT YOU WERE NOT ABLE TO STOP DRINKING ONCE YOU HAD STARTED: 0
HAVE YOU OR SOMEONE ELSE BEEN INJURED AS A RESULT OF YOUR DRINKING: 0
HAS A RELATIVE, FRIEND, DOCTOR, OR ANOTHER HEALTH PROFESSIONAL EXPRESSED CONCERN ABOUT YOUR DRINKING OR SUGGESTED YOU CUT DOWN: 0
AUDIT-C TOTAL SCORE: 0
HOW OFTEN DURING THE LAST YEAR HAVE YOU FAILED TO DO WHAT WAS NORMALLY EXPECTED FROM YOU BECAUSE OF DRINKING: NEVER
HOW MANY STANDARD DRINKS CONTAINING ALCOHOL DO YOU HAVE ON A TYPICAL DAY: ONE OR TWO
AUDIT TOTAL SCORE: 0
AUDIT-C TOTAL SCORE: 4
HAVE YOU OR SOMEONE ELSE BEEN INJURED AS A RESULT OF YOUR DRINKING: NO

## 2021-06-29 ASSESSMENT — PATIENT HEALTH QUESTIONNAIRE - PHQ9
2. FEELING DOWN, DEPRESSED OR HOPELESS: 0
SUM OF ALL RESPONSES TO PHQ QUESTIONS 1-9: 0
SUM OF ALL RESPONSES TO PHQ9 QUESTIONS 1 & 2: 0
1. LITTLE INTEREST OR PLEASURE IN DOING THINGS: 0
SUM OF ALL RESPONSES TO PHQ QUESTIONS 1-9: 0
SUM OF ALL RESPONSES TO PHQ QUESTIONS 1-9: 0

## 2021-07-06 ENCOUNTER — OFFICE VISIT (OUTPATIENT)
Dept: INTERNAL MEDICINE CLINIC | Age: 71
End: 2021-07-06
Payer: MEDICARE

## 2021-07-06 VITALS
TEMPERATURE: 97.3 F | SYSTOLIC BLOOD PRESSURE: 124 MMHG | BODY MASS INDEX: 35.56 KG/M2 | HEIGHT: 71 IN | WEIGHT: 254 LBS | OXYGEN SATURATION: 97 % | HEART RATE: 72 BPM | DIASTOLIC BLOOD PRESSURE: 70 MMHG

## 2021-07-06 DIAGNOSIS — Z00.00 ROUTINE GENERAL MEDICAL EXAMINATION AT A HEALTH CARE FACILITY: Primary | ICD-10-CM

## 2021-07-06 DIAGNOSIS — E29.1 TESTOSTERONE DEFICIENCY IN MALE: ICD-10-CM

## 2021-07-06 DIAGNOSIS — Z12.5 SCREENING PSA (PROSTATE SPECIFIC ANTIGEN): ICD-10-CM

## 2021-07-06 DIAGNOSIS — I10 ESSENTIAL HYPERTENSION: ICD-10-CM

## 2021-07-06 PROCEDURE — G0438 PPPS, INITIAL VISIT: HCPCS | Performed by: INTERNAL MEDICINE

## 2021-07-06 PROCEDURE — 3052F HG A1C>EQUAL 8.0%<EQUAL 9.0%: CPT | Performed by: INTERNAL MEDICINE

## 2021-07-06 PROCEDURE — 99213 OFFICE O/P EST LOW 20 MIN: CPT | Performed by: INTERNAL MEDICINE

## 2021-07-06 RX ORDER — CIPROFLOXACIN 500 MG/1
500 TABLET, FILM COATED ORAL 2 TIMES DAILY
COMMUNITY
End: 2021-10-18

## 2021-07-06 RX ORDER — METRONIDAZOLE 500 MG/1
500 TABLET ORAL 3 TIMES DAILY
COMMUNITY
End: 2022-01-06

## 2021-07-06 SDOH — ECONOMIC STABILITY: FOOD INSECURITY: WITHIN THE PAST 12 MONTHS, YOU WORRIED THAT YOUR FOOD WOULD RUN OUT BEFORE YOU GOT MONEY TO BUY MORE.: NEVER TRUE

## 2021-07-06 SDOH — ECONOMIC STABILITY: FOOD INSECURITY: WITHIN THE PAST 12 MONTHS, THE FOOD YOU BOUGHT JUST DIDN'T LAST AND YOU DIDN'T HAVE MONEY TO GET MORE.: NEVER TRUE

## 2021-07-06 ASSESSMENT — SOCIAL DETERMINANTS OF HEALTH (SDOH): HOW HARD IS IT FOR YOU TO PAY FOR THE VERY BASICS LIKE FOOD, HOUSING, MEDICAL CARE, AND HEATING?: NOT HARD AT ALL

## 2021-07-06 NOTE — PATIENT INSTRUCTIONS
Personalized Preventive Plan for Awa Armstrong MD - 7/6/2021  Medicare offers a range of preventive health benefits. Some of the tests and screenings are paid in full while other may be subject to a deductible, co-insurance, and/or copay. Some of these benefits include a comprehensive review of your medical history including lifestyle, illnesses that may run in your family, and various assessments and screenings as appropriate. After reviewing your medical record and screening and assessments performed today your provider may have ordered immunizations, labs, imaging, and/or referrals for you. A list of these orders (if applicable) as well as your Preventive Care list are included within your After Visit Summary for your review. Other Preventive Recommendations:    · A preventive eye exam performed by an eye specialist is recommended every 1-2 years to screen for glaucoma; cataracts, macular degeneration, and other eye disorders. · A preventive dental visit is recommended every 6 months. · Try to get at least 150 minutes of exercise per week or 10,000 steps per day on a pedometer . · Order or download the FREE \"Exercise & Physical Activity: Your Everyday Guide\" from [employer]. Call 3-281.705.7886 or search [employer] online. · You need 6689-8008 mg of calcium and 1610-7000 IU of vitamin D per day. It is possible to meet your calcium requirement with diet alone, but a vitamin D supplement is usually necessary to meet this goal.  · When exposed to the sun, use a sunscreen that protects against both UVA and UVB radiation with an SPF of 30 or greater. Reapply every 2 to 3 hours or after sweating, drying off with a towel, or swimming. · Always wear a seat belt when traveling in a car. Always wear a helmet when riding a bicycle or motorcycle.

## 2021-07-06 NOTE — PROGRESS NOTES
Medicare Annual Wellness Visit  Name: Ade Chandra MD Todays Date: 2021   MRN: [id number] Sex: Male   Age: 66 y.o. Ethnicity: Non-/Non    : 1950 Race: Veronika Navarro MD is here for Follow-Up from [facility] (20 for dehydration), Medicare AWV, and Diabetes (A1C was 8.2 while inpatient )    Patient was recently admitted to the hospital in [place] for dehydration associated with infective gastroenteritis colitis as well as renal impairment. He already finished Cipro and Flagyl. He is reluctant to have any follow-up lab work done today. History of primary hypogonadism. He continues to take testosterone supplement. History of diabetes mellitus. Last hemoglobin A1c was 8.7. He continue to follow-up with endocrinologist.  He continues to have distal stocking area tingling numbness due to diabetic neuropathy. History of hypertension. Patient denies chest pain palpitation dizziness. Screenings for behavioral, psychosocial and functional/safety risks, and cognitive dysfunction are all negative except as indicated below. These results, as well as other patient data from the [address] form, are documented in Flowsheets linked to this Encounter. Patient is up-to-date on colonoscopy. Allergies   Allergen Reactions    Zosyn [Piperacillin Sod-Tazobactam So]     Demerol      vomiting    Metformin Other (See Comments)     Intolerent    Metformin And Related      intolerant         Prior to Visit Medications    Medication Sig Taking?  Authorizing Provider   ciprofloxacin (CIPRO) 500 MG tablet Take 500 mg by mouth 2 times daily Yes Historical Provider, MD   metroNIDAZOLE (FLAGYL) 500 MG tablet Take 500 mg by mouth 3 times daily Yes Historical Provider, MD   insulin lispro protamine & lispro (HUMALOG MIX) (75-25) 100 UNIT per ML SUSP injection vial Inject into the skin 2 times daily (with meals) Yes Historical Provider, MD   levothyroxine (SYNTHROID) 100 MCG tablet 1 TABLET BY MOUTH ONCE DAILY Yes Kavya Moe MD   Dulaglutide (TRULICITY) 3 DV/2.2QW SOPN Inject 3 mg into the skin once a week Yes Kavya Moe MD   Testosterone (ANDROGEL) 20.25 MG/ACT (1.62%) GEL gel APPLY 2 PUMPS TO SKIN ONCE DAILY AS DIRECTED **MAY REFILL** Yes Lakeshia Ulloa MD   vitamin D (ERGOCALCIFEROL) 1.25 MG (54280 UT) CAPS capsule 1 CAPSULE EVERY WEEK Yes SIMÓN Waggoner MD   empagliflozin (JARDIANCE) 25 MG tablet Take 25 mg by mouth daily Yes Lakeshia Ulloa MD   Continuous Blood Gluc Sensor (Premium StoreSTYLE ALBA 14 DAY SENSOR) MISC USE 1 SENSOR EVERY 14 DAYS (PRESCRIBER OK IS NEEDED FOR NEXT FILL) Yes Kavya Moe MD   potassium citrate (UROCIT-K) 10 MEQ (1080 MG) extended release tablet 1 TABLET ONCE DAILY (PRESCRIBER OK IS NEEDED FOR NEXT FILL) Yes Lakeshia Ulloa MD   omega-3 acid ethyl esters (LOVAZA) 1 g capsule Take 2 capsules by mouth 2 times daily Yes Lakeshia Ulloa MD   amLODIPine (NORVASC) 10 MG tablet 1 TABLET BY MOUTH ONCE DAILY (Nurys Fennel) (PRESCRIBER OK IS NEEDED FOR NEXT FILL) Yes Lakeshia Ulloa MD   allopurinol (ZYLOPRIM) 300 MG tablet TAKE 1 TABLET ONCE DAILY * (PRESCRIBER OK IS NEEDED FOR NEXT FILL) Yes Lakeshia Ulloa MD   insulin glargine (LANTUS SOLOSTAR) 100 UNIT/ML injection pen INJECT 50 UNITS SUBCUTANEOUSLY 2 TIMES A DAY (PRESCRIBER OK NEEDED FOR NEXT REFILL) Yes Lakeshia Ulloa MD   insulin lispro (HUMALOG KWIKPEN) 200 UNIT/ML SOPN pen INJECT 25 UNITS BEFORE MEALS plus correction 2 units for every 50 of BG above 150 qac Yes Lakeshia Ulloa MD   atorvastatin (LIPITOR) 10 MG tablet TAKE ONE TABLET DAILY FOR CHOLESTEROL (GENERIC FOR LIPITOR) (PRESCRIBER OK IS NEEDED FOR NEXT FILL) Yes SIMÓN Waggoner MD   Insulin Pen Needle (B-D UF III MINI PEN NEEDLES) 31G X 5 MM MISC USE 6 TIMES PER DAY OR AS DIRECTED FOR LANTUS - HUMALOG (PRESCRIBER OK IS NEEDED FOR NEXT FILL) Yes Enriqueta Waggoner MD   benazepril (LOTENSIN) 40 MG tablet Take 1 tablet by mouth daily Yes Harsha Killian MD   blood glucose test strips (ONE TOUCH ULTRA TEST) strip qid Yes Litzy Lema MD   Continuous Blood Gluc  (FREESTYLE ALBA READER) DELMIS 1 Units by Does not apply route as needed (as needed) Yes Litzy Lema MD   Turmeric 500 MG CAPS Take 1,000 mg by mouth daily Yes Historical Provider, MD   UNABLE TO FIND CPAP supplies, water reservoir and tubing. Dx 686.95 Yes Contreras Ford MD   Blood Glucose Monitoring Suppl (ONE TOUCH ULTRA SYSTEM KIT) W/DEVICE KIT AS DIRECTED Yes Contreras Ford MD   aspirin EC 81 MG EC tablet Take 1 tablet by mouth daily. Yes Litzy Lema MD   Multiple Vitamin (MULTIVITAMIN PO) Take  by mouth daily. Yes Historical Provider, MD         Past Medical History:   Diagnosis Date    Hearing loss     Hypogonadism in male     Hypothyroidism     Kidney stones     MAO (obstructive sleep apnea)     Type II or unspecified type diabetes mellitus without mention of complication, not stated as uncontrolled        Past Surgical History:   Procedure Laterality Date    CATARACT REMOVAL WITH IMPLANT Bilateral     CHOLECYSTECTOMY      9/11    COLONOSCOPY      2009    COLONOSCOPY  01/14/2021    ,Normal colonoscopy,Diverticulosis. repeat 5-7 years.     THYROIDECTOMY      lt lobectomy 2/2010    TONSILLECTOMY           Family History   Problem Relation Age of Onset    Cancer Father         skin    Coronary Art Dis Father     Diabetes Father     Coronary Art Dis Paternal Uncle     Diabetes Paternal Uncle     Stroke Paternal Uncle     Heart Disease Neg Hx     High Blood Pressure Neg Hx     Osteoporosis Neg Hx     Thyroid Disease Neg Hx     Heart Attack Neg Hx        CareTeam (Including outside providers/suppliers regularly involved in providing care):   Patient Care Team:  Harsha Killian MD as PCP - General (Internal Medicine)  Harsha Killian MD as PCP - [facility] Empaneled Provider  Brittany Calix (Ophthalmology)  Key Judge MD as Consulting Physician (Sleep Medicine)  EMILY Reich CNP as Nurse Practitioner (Nurse Practitioner)  Tamiko Christie MD as Consulting Physician (Endocrinology)  Valdez Arevalo MD as Consulting Physician (Gastroenterology)    Wt Readings from Last 3 Encounters:   07/06/21 254 lb (115.2 kg)   06/09/21 260 lb (117.9 kg)   01/04/21 260 lb (117.9 kg)     Vitals:    07/06/21 0847   BP: 124/70   Pulse: 72   Temp: 97.3 °F (36.3 °C)   TempSrc: Temporal   SpO2: 97%   Weight: 254 lb (115.2 kg)   Height: 5' 11\" (1.803 m)     Body mass index is 35.43 kg/m². Based upon direct observation of the patient, evaluation of cognition reveals recent and remote memory intact. General Appearance: alert and oriented to person, place and time, well developed , +ve obese  Skin: warm and dry, no rash or erythema  Head: normocephalic and atraumatic  Eyes: pupils equal, round, and reactive to light, extraocular eye movements intact, conjunctivae normal  Pulmonary/Chest: clear to auscultation bilaterally- no wheezes, rales or rhonchi, normal air movement, no respiratory distress  Cardiovascular: normal rate, regular rhythm, normal S1 and S2, no murmurs, rubs, clicks, or gallops, distal pulses intact, no carotid bruits  Extremities: no cyanosis, clubbing or edema  Sensory exam of the foot is decreased, tested with the monofilament. Bilateral symmetrical slightly decreased dorsalis pedis pulses , no lesions or ulcers, good peripheral pulses. Patient does not want to go for follow-up Doppler study    Musculoskeletal: normal range of motion, no joint swelling, deformity or tenderness  Patient's complete Health Risk Assessment and screening values have been reviewed and are found in Flowsheets. The following problems were reviewed today and where indicated follow up appointments were made and/or referrals ordered.     Positive Risk Factor Screenings with Interventions:            General Health and ACP:  General  In general, how would you say your health is?: Good  In the past 7 days, have you experienced any of the following?  New or Increased Pain, New or Increased Fatigue, Loneliness, Social Isolation, Stress or Anger?: None of These  Do you get the social and emotional support that you need?: Yes  Do you have a Living Will?: (!) No  Advance Directives     Power of Wilbur Sultana Will ACP-Advance Directive ACP-Power of     Not on File Not on File Not on File Not on File      General Health Risk Interventions:  · No Living Will: Patient will get one    Health Habits/Nutrition:  Health Habits/Nutrition  Do you exercise for at least 20 minutes 2-3 times per week?: Yes  Have you lost any weight without trying in the past 3 months?: No  Do you eat only one meal per day?: No  Have you seen the dentist within the past year?: Yes  Body mass index: (!) 35.42  Health Habits/Nutrition Interventions:  · Inadequate physical activity:  patient agrees to exercise for at least 150 minutes/week    Hearing/Vision:  No exam data present  Hearing/Vision  Do you or your family notice any trouble with your hearing that hasn't been managed with hearing aids?: No  Do you have difficulty driving, watching TV, or doing any of your daily activities because of your eyesight?: No  Have you had an eye exam within the past year?: (!) No  Hearing/Vision Interventions:  · Vision concerns:  patient encouraged to make appointment with his/her eye specialist    Safety:  Safety  Do you have working smoke detectors?: Yes  Have all throw rugs been removed or fastened?: (!) No  Do you have non-slip mats or surfaces in all bathtubs/showers?: Yes  Do all of your stairways have a railing or banister?: Yes  Are your doorways, halls and stairs free of clutter?: Yes  Do you always fasten your seatbelt when you are in a car?: Yes  Safety Interventions:  · Home safety tips provided     Personalized Preventive Plan   Current Health Maintenance Status  Immunization History   Administered Date(s) Administered    Influenza, High Dose (Fluzone 65 yrs and older) 09/19/2016, 10/19/2017, 09/26/2018    Influenza, Triv, inactivated, subunit, adjuvanted, IM (Fluad 65 yrs and older) 10/16/2019    Pneumococcal Conjugate 13-valent ([id number]) 01/12/2016    Pneumococcal Polysaccharide ([id number]) 06/17/2014    Zoster Recombinant (Shingrix) 03/22/2019, 07/22/2019        Health Maintenance   Topic Date Due    Annual Wellness Visit (AWV)  Never done    Diabetic retinal exam  09/05/2020    Flu vaccine (1) 09/01/2021    A1C test (Diabetic or Prediabetic)  06/08/2022    Diabetic microalbuminuria test  06/08/2022    Lipid screen  06/08/2022    TSH testing  06/08/2022    Potassium monitoring  06/08/2022    Creatinine monitoring  06/08/2022    Diabetic foot exam  06/09/2022    DTaP/Tdap/Td vaccine (2 - Td or Tdap) 11/25/2023    Colon cancer screen colonoscopy  02/09/2026    Shingles Vaccine  Completed    Pneumococcal 65+ years Vaccine  Completed    COVID-19 Vaccine  Completed    Hepatitis C screen  Completed    Hepatitis A vaccine  Aged Out    Hib vaccine  Aged Out    Meningococcal (ACWY) vaccine  Aged Out     Recommendations for [place] Due: see orders and patient instructions/AVS.  . Recommended screening schedule for the next 5-10 years is provided to the patient in written form: see Patient Instructions/AVS.    Love was seen today for follow-up from hospitals, medicare awv and diabetes. Diagnoses and all orders for this visit:    Routine general medical examination at a health care facility    Testosterone deficiency in male  We will get PSA as well as CBC. Continue testosterone supplement  Essential hypertension  Blood pressure has been well controlled. Type 2 diabetes, uncontrolled, with neuropathy ([address]  Continue to follow-up with endocrinologist.    Diet lifestyle changes. Screening prostate cancer.   An After Visit Summary was printed and given to the patient. Documentation was done using voice recognition dragon software. Every effort was made to ensure accuracy; however, inadvertent  Unintentional computerized transcription errors may be present.

## 2021-07-14 DIAGNOSIS — Z12.5 SCREENING PSA (PROSTATE SPECIFIC ANTIGEN): ICD-10-CM

## 2021-07-14 DIAGNOSIS — E29.1 TESTOSTERONE DEFICIENCY IN MALE: ICD-10-CM

## 2021-07-14 DIAGNOSIS — Z00.00 ROUTINE GENERAL MEDICAL EXAMINATION AT A HEALTH CARE FACILITY: ICD-10-CM

## 2021-07-14 LAB
HCT VFR BLD CALC: 42.2 % (ref 40.5–52.5)
HEMOGLOBIN: 14.3 G/DL (ref 13.5–17.5)
MCH RBC QN AUTO: 30 PG (ref 26–34)
MCHC RBC AUTO-ENTMCNC: 33.8 G/DL (ref 31–36)
MCV RBC AUTO: 88.9 FL (ref 80–100)
PDW BLD-RTO: 15.1 % (ref 12.4–15.4)
PLATELET # BLD: 273 K/UL (ref 135–450)
PMV BLD AUTO: 9.1 FL (ref 5–10.5)
RBC # BLD: 4.74 M/UL (ref 4.2–5.9)
WBC # BLD: 9.5 K/UL (ref 4–11)

## 2021-07-15 LAB — PROSTATE SPECIFIC ANTIGEN: 2.48 NG/ML (ref 0–4)

## 2021-08-31 DIAGNOSIS — E29.1 PRIMARY MALE HYPOGONADISM: ICD-10-CM

## 2021-08-31 RX ORDER — BENAZEPRIL HYDROCHLORIDE 40 MG/1
TABLET, FILM COATED ORAL
Qty: 90 TABLET | Refills: 3 | Status: SHIPPED | OUTPATIENT
Start: 2021-08-31 | End: 2022-07-11 | Stop reason: SDUPTHER

## 2021-09-01 RX ORDER — INSULIN GLARGINE 100 [IU]/ML
INJECTION, SOLUTION SUBCUTANEOUS
Qty: 30 ML | Refills: 3 | Status: SHIPPED | OUTPATIENT
Start: 2021-09-01 | End: 2022-01-12

## 2021-09-01 NOTE — TELEPHONE ENCOUNTER
Requested Prescriptions     Pending Prescriptions Disp Refills    insulin glargine (LANTUS SOLOSTAR) 100 UNIT/ML injection pen [Pharmacy Med Name: LANTUS SOLOSTAR 100 UNITS/M 100 Solution Pen-injector] 30 mL 3     Sig: INJECT 50 UNITS SUBCUTANEOUSLY 2 TIMES A DAY **MAY REFILL** [email or web address] @@@@@@@NEW RX IN [email or web address]     LAST OV 6/9/21  NEXT OV 9/15/21  LAST REFILL 8/2/2021  RECENT LAB 6/8/21

## 2021-09-07 ENCOUNTER — TELEPHONE (OUTPATIENT)
Dept: INTERNAL MEDICINE CLINIC | Age: 71
End: 2021-09-07

## 2021-09-07 NOTE — TELEPHONE ENCOUNTER
Pt calling about PA. Informed him it has been sent to the PA department and it is something that is handled outside of our office. States he has been without medication for over a week and will continue to call the office until it is done. Asks to call when medication is approved.

## 2021-09-07 NOTE — TELEPHONE ENCOUNTER
Patient came into the office to request a PA for Testosterone (ANDROGEL) 20.25 MG/ACT gel sent to his insurance company. States the pharmacy is holding it until a PA is sent to insurance. Patient care pharmacy on [facility]. Phone # [phone].     Last OV- 7/6/21  Next OV- 1/6/22

## 2021-09-07 NOTE — TELEPHONE ENCOUNTER
I need a current RX on file to submit PA. The one in the chart is . Key: [id number]    If this requires a response please respond to the pool ( [facility] [address]). Thank you please advise patient.

## 2021-09-08 NOTE — TELEPHONE ENCOUNTER
Submitted PA for TESTOSTERONE  [facility] Key: [id number] STATUS: \": Approved, Coverage Starts on: 2021 12:00:00 AM, Coverage Ends on: 2022\"    Technically refills do not count when the RX says . When he does the initial script he needs to change the date on RX to match the refills so the RX will not  until the patient out of refills. We did it though and APPROVED. If this requires a response please respond to the pool ( [facility] [address] [facility]). Thank you please advise patient.

## 2021-09-14 ENCOUNTER — HOSPITAL ENCOUNTER (OUTPATIENT)
Age: 71
Discharge: HOME | End: 2021-09-14
Payer: MEDICARE

## 2021-09-14 DIAGNOSIS — E03.9 ACQUIRED HYPOTHYROIDISM: ICD-10-CM

## 2021-09-14 DIAGNOSIS — E78.2 MIXED HYPERLIPIDEMIA: ICD-10-CM

## 2021-09-14 DIAGNOSIS — E55.9 VITAMIN D DEFICIENCY: ICD-10-CM

## 2021-09-14 LAB
A/G RATIO: 1.4 (ref 1.1–2.2)
ALBUMIN SERPL-MCNC: 4.3 G/DL (ref 3.4–5)
ALP BLD-CCNC: 110 U/L (ref 40–129)
ALT SERPL-CCNC: 28 U/L (ref 10–40)
ANION GAP SERPL CALCULATED.3IONS-SCNC: 12 MMOL/L (ref 3–16)
AST SERPL-CCNC: 33 U/L (ref 15–37)
BILIRUB SERPL-MCNC: 0.3 MG/DL (ref 0–1)
BUN BLDV-MCNC: 24 MG/DL (ref 7–20)
CALCIUM SERPL-MCNC: 10.2 MG/DL (ref 8.3–10.6)
CHLORIDE BLD-SCNC: 104 MMOL/L (ref 99–110)
CHOLESTEROL, TOTAL: 157 MG/DL (ref 0–199)
CO2: 23 MMOL/L (ref 21–32)
CREAT SERPL-MCNC: 1.2 MG/DL (ref 0.8–1.3)
GFR AFRICAN AMERICAN: >60
GFR NON-AFRICAN AMERICAN: 59
GLOBULIN: 3.1 G/DL
GLUCOSE BLD-MCNC: 123 MG/DL (ref 70–99)
HDLC SERPL-MCNC: 27 MG/DL (ref 40–60)
LDL CHOLESTEROL CALCULATED: ABNORMAL MG/DL
LDL CHOLESTEROL DIRECT: 75 MG/DL
POTASSIUM SERPL-SCNC: 4.4 MMOL/L (ref 3.5–5.1)
SODIUM BLD-SCNC: 139 MMOL/L (ref 136–145)
TOTAL PROTEIN: 7.4 G/DL (ref 6.4–8.2)
TRIGL SERPL-MCNC: 321 MG/DL (ref 0–150)
VITAMIN D 25-HYDROXY: 50.5 NG/ML
VLDLC SERPL CALC-MCNC: ABNORMAL MG/DL

## 2021-09-14 PROCEDURE — 80061 LIPID PANEL: CPT

## 2021-09-14 PROCEDURE — 83036 HEMOGLOBIN GLYCOSYLATED A1C: CPT

## 2021-09-14 PROCEDURE — 82306 VITAMIN D 25 HYDROXY: CPT

## 2021-09-14 PROCEDURE — 36415 COLL VENOUS BLD VENIPUNCTURE: CPT

## 2021-09-14 PROCEDURE — 80053 COMPREHEN METABOLIC PANEL: CPT

## 2021-09-15 ENCOUNTER — OFFICE VISIT (OUTPATIENT)
Dept: ENDOCRINOLOGY | Age: 71
End: 2021-09-15
Payer: MEDICARE

## 2021-09-15 VITALS
BODY MASS INDEX: 35.78 KG/M2 | SYSTOLIC BLOOD PRESSURE: 99 MMHG | OXYGEN SATURATION: 98 % | HEIGHT: 71 IN | WEIGHT: 255.6 LBS | DIASTOLIC BLOOD PRESSURE: 60 MMHG | HEART RATE: 83 BPM | RESPIRATION RATE: 14 BRPM | TEMPERATURE: 98 F

## 2021-09-15 DIAGNOSIS — E03.9 ACQUIRED HYPOTHYROIDISM: ICD-10-CM

## 2021-09-15 DIAGNOSIS — I10 ESSENTIAL HYPERTENSION: Chronic | ICD-10-CM

## 2021-09-15 DIAGNOSIS — E78.2 MIXED HYPERLIPIDEMIA: ICD-10-CM

## 2021-09-15 DIAGNOSIS — E66.01 CLASS 2 SEVERE OBESITY WITH SERIOUS COMORBIDITY AND BODY MASS INDEX (BMI) OF 35.0 TO 35.9 IN ADULT, UNSPECIFIED OBESITY TYPE (HCC): ICD-10-CM

## 2021-09-15 DIAGNOSIS — E55.9 VITAMIN D DEFICIENCY: ICD-10-CM

## 2021-09-15 DIAGNOSIS — R79.89 ELEVATED SERUM CREATININE: ICD-10-CM

## 2021-09-15 LAB
ESTIMATED AVERAGE GLUCOSE: 191.5 MG/DL
HBA1C MFR BLD: 8.3 %

## 2021-09-15 PROCEDURE — 3052F HG A1C>EQUAL 8.0%<EQUAL 9.0%: CPT | Performed by: INTERNAL MEDICINE

## 2021-09-15 PROCEDURE — 99214 OFFICE O/P EST MOD 30 MIN: CPT | Performed by: INTERNAL MEDICINE

## 2021-09-15 NOTE — PROGRESS NOTES
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 Current symptoms/problems include hyperglycemia and show no change. 1.  Type 2 diabetes, uncontrolled, with neuropathy (HCC) [E11.40, E11.65]    Diagnosed with Type 2 diabetes mellitus in 1994.     Comorbid conditions: hyperlipidemia, HTN and Neuropathy    Current diabetic medications include: Trulicity, Humalog 25 units 3 times daily, Lantus 50 mg twice daily, Jardiance 25 mg daily    Intolerance to diabetes medications: Metformin upset GI     Weight trend: stable  Prior visit with dietician: yes  Current diet: on average, 3 meals per day  Current exercise: Golf twice weekly, with walks daily, swimming twice a week     Current monitoring regimen: home blood tests - 4 times daily  Has brought blood glucose log/meter:  Yes  Home blood sugar records: fasting range: 105 - 135 and postprandial range:  130-140  Any episodes of hypoglycemia? None   Hypoglycemia frequency and time(s):  1-2 episodes per year   Does patient have Glucagon emergency kit? No  Does patient have rapid acting carbohydrate? Yes  Does patient wear a medic alert bracelet or necklace? No    2. Acquired hypothyroidism  Has fatigue. 3. Essential hypertension  No headaches. 4. Mixed hyperlipidemia  No muscle pain. 5. Vitamin D deficiency  Has fatigue. 6. Obesity  On diet, exercise    7. Elevated creatinine  No urination problems      THYROID ULTRASOUND-       INDICATION- Type 2 diabetes, history of hypothyroidism. Patient is   status post left thyroid lobectomy.       FINDINGS-        The right thyroid lobe appears normal, measuring 4.9 x 1.4 x 1.6   cm. Thyroid parenchyma is mildly heterogeneous but no focal lesion   is detected. The left thyroid lobe is not visualized.       IMPRESSION-        1.  Mild heterogeneity of the right thyroid lobe with no focal   lesion detected.       2. Status post left thyroid lobectomy.            Read Marco A Buckley      Released Vivien Deshpande        Released Date Time- 02/08/10 1137            - Serena Esparza         Lab Results   Component Value Date    LABA1C 8.3 09/14/2021     Lab Results   Component Value Date    .5 09/14/2021           Past Medical History:   Diagnosis Date    Hearing loss     Hypogonadism in male     Hypothyroidism     Kidney stones     MAO (obstructive sleep apnea)     Type II or unspecified type diabetes mellitus without mention of complication, not stated as uncontrolled       Patient Active Problem List   Diagnosis    [facility] polyneuropathy ([facility] [address]    Acquired hypothyroidism    Mixed hyperlipidemia    Primary male hypogonadism    Vitamin D deficiency    Type 2 diabetes, uncontrolled, with neuropathy ([facility] [address]    Essential hypertension    Obstructive sleep apnea syndrome    Class 2 severe obesity with serious comorbidity and body mass index (BMI) of 35.0 to 35.9 in adult [facility])    Elevated serum creatinine     Past Surgical History:   Procedure Laterality Date    CATARACT REMOVAL WITH IMPLANT Bilateral     CHOLECYSTECTOMY      9/11    COLONOSCOPY      2009    COLONOSCOPY  01/14/2021    ,Normal colonoscopy,Diverticulosis. repeat 5-7 years.  THYROIDECTOMY      lt lobectomy 2/2010    TONSILLECTOMY       Social History     Socioeconomic History    Marital status:      Spouse name: Not on file    Number of children: Not on file    Years of education: Not on file    Highest education level: Not on file   Occupational History    Occupation: retired pediatrician   Tobacco Use    Smoking status: Never Smoker    Smokeless tobacco: Never Used   Vaping Use    Vaping Use: Never used   Substance and Sexual Activity    Alcohol use:  Yes     Alcohol/week: 1.0 standard drinks     Types: 1 Cans of beer per week     Comment: 4 of 7 days     Drug use: No    Sexual activity: Never   Other Topics Concern    Not on file   Social History Narrative    Not on file     Social Determinants of Health     Financial Resource Strain: Low Risk     Difficulty of Paying Living Expenses: Not hard at all   Food Insecurity: No Food Insecurity    Worried About Running Out of Food in the Last Year: Never true    [address] in the Last Year: Never true   Transportation Needs:     Lack of Transportation (Medical):      Lack of Transportation (Non-Medical):    Physical Activity:     Days of Exercise per Week:     Minutes of Exercise per Session:    Stress:     Feeling of Stress :    Social Connections:     Frequency of Communication with Friends and Family:     Frequency of Social Gatherings with Friends and Family:     Attends [employer] Services:     Active Member of Clubs or Organizations:     Attends Club or Organization Meetings:     Marital Status:    Intimate Partner Violence:     Fear of Current or Ex-Partner:     Emotionally Abused:     Physically Abused:     Sexually Abused:      Family History   Problem Relation Age of Onset    Cancer Father         skin    Coronary Art Dis Father     Diabetes Father     Coronary Art Dis Paternal Uncle     Diabetes Paternal Uncle     Stroke Paternal Uncle     Heart Disease Neg Hx     High Blood Pressure Neg Hx     Osteoporosis Neg Hx     Thyroid Disease Neg Hx     Heart Attack Neg Hx        Allergies   Allergen Reactions    Zosyn [Piperacillin Sod-Tazobactam So]     Demerol      vomiting    Metformin Other (See Comments)     [place]    Metformin And Related      intolerant     Family Status   Relation Name Status    Father      Mother  Alive    PUnc  (Not Specified)    Neg Hx  (Not Specified)       Review of Systems  Constitutional: has minimal, no fever, no recent weight gain, no recent weight loss, no changes in appetite  Eyes: no eye pain, bilateral cataracts removal   Ears, nose, throat: no nasal congestion, no sore throat, no earache, no decrease in hearing, no hoarseness, no dry mouth, no sinus problems, no difficulty swallowing, no neck lumps, no dental problems, no mouth sores, no ringing in ears  Pulmonary: no shortness of breath, no wheezing, no dyspnea on exertion, no cough  Cardiovascular: no chest pain, no lower extremity edema, no orthopnea, no intermittent leg claudication, no palpitations  Gastrointestinal: no abdominal pain, no nausea, no vomiting, no diarrhea, no constipation, no dysphagia, no heartburn, no bloating  Genitourinary: no dysuria, no urinary incontinence, no urinary hesitancy, no urinary frequency, no feelings of urinary urgency, no nocturia  Musculoskeletal: no joint swelling, no joint stiffness, no joint pain, no muscle cramps, no muscle pain, no bone pain, no fractures  Integument/Breast:  no skin rashes, no skin lesions, no itching, no dry skin  Neurological: Has numbness, has tingling, no weakness, no confusion, no headaches, no dizziness, no fainting, no tremors, no decrease in memory, no balance problems  Psychiatric: no anxiety, no depression, no insomnia  Hematologic/Lymphatic: no tendency for easy bleeding, no swollen lymph nodes, no tendency for easy bruising  Immunology: has seasonal allergies, no frequent infections, no frequent illnesses  Endocrine: no temperature intolerance     OBJECTIVE:  BP 99/60   Pulse 83   Temp 98 °F (36.7 °C)   Resp 14   Ht 5' 11\" (1.803 m)   Wt 255 lb 9.6 oz (115.9 kg)   SpO2 98%   BMI 35.65 kg/m²      Wt Readings from Last 3 Encounters:   09/15/21 255 lb 9.6 oz (115.9 kg)   07/06/21 254 lb (115.2 kg)   06/09/21 260 lb (117.9 kg)       Constitutional: no acute distress, well appearing and well nourished  Psychiatric: oriented to person, place and time, judgement and insight and normal, recent and remote memory and intact and mood and affect are normal  Skin: skin and subcutaneous tissue is normal without mass, normal turgor  Head and Face: examination of head and face revealed no abnormalities  Eyes: no lid or conjunctival swelling, erythema or discharge, pupils are normal, equal, round, reactive to light  Ears/Nose: external inspection of ears and nose revealed no abnormalities, hearing is grossly normal  Oropharynx/Mouth/Face: lips, tongue and gums are normal with no lesions, the voice quality was normal  Neck: neck is supple and symmetric, with midline trachea and no masses, thyroid left lobe absent, right lobe normal  Lymphatics: normal cervical lymph nodes, normal supraclavicular nodes  Pulmonary: no increased work of breathing or signs of respiratory distress, lungs are clear to auscultation  Cardiovascular: normal heart rate and rhythm, normal S1 and S2, no murmurs and pedal pulses and 2+ bilaterally, No edema  Abdomen: abdomen is soft, non-tender with no masses  Musculoskeletal: normal gait and station and exam of the digits and nails are normal  Neurological: normal coordination and normal general cortical function    Visual inspection: 9/2019 appointment with optho  Deformity/amputation: absent  Skin lesions/pre-ulcerative calluses: absent  Edema: right- negative, left- negative    Visual inspection:   Deformity/amputation: absent  Skin lesions/pre-ulcerative calluses: absent  Edema: right- negative, left- negative     Sensory exam:   Monofilament sensation: abnormal  (minimum of 5 random plantar locations tested, avoiding callused areas - > 1 area with absence of sensation is + for neuropathy)  High risk feet     Plus at least one of the following:  Pulses: normal  Proprioception: Impaired  Vibration (128 Hz): Absent    ASSESSMENT/PLAN:    1. Type 2 diabetes, uncontrolled, with neuropathy (HCC)  Worsening, patient is undergoing a lot of stress, which affects his blood glucose control  Hemoglobin A1c 10.1-7.8-8.5-8.2-7.6-8.7-8.3  Patient states he is eating better. On low carb diet. Exercises.   - continue medications   - Lantus  50 units bid  - Humalog 25 units qac plus correction 2 units for every 50 of BG above 150 qac  - Increase Trulicity to 3 mg weekly    2. Acquired hypothyroidism  - Levothyroxine 0.1 mg   - TSH 0.68-1.15-0.55-1.91    3. Essential hypertension  Same medications  Hypertension well controlled     4. Mixed hyperlipidemia  HDL 27-27  LDL [id number]  [phone]  - Atorvastatin   - LDL well controlled     5. Vitamin D deficiency  25OH vit D 45-52-65.8-64.1-56.6-50.5  Weekly Supplement  - Vitamin D levels within normal limits     6. Obesity  Diet, exercise    7. Elevated creatinine  Follow-up  Creatinine 1.5 -1.1-1.2-1.1-1.2  GFR>60-59  Follow with family doctor    Reviewed and/or ordered clinical lab results Yes  Reviewed and/or ordered radiology tests Yes  Reviewed and/or ordered other diagnostic tests No  Discussed test results with performing physician No  Independently reviewed image, tracing, or specimen No  Made a decision to obtain old records No  Reviewed old records Yes  Obtained history from other than patient No    Pooja Fowler MD was counseled regarding symptoms of diabetes diagnosis, course and complications of disease if inadequately treated, side effects of medications, diagnosis, treatment options, and prognosis, risks, benefits, complications, and alternatives of treatment, labs, imaging and other studies and treatment targets and goals. He understands instructions and counseling. Return in about 3 months (around 12/15/2021) for diabetes.

## 2021-10-18 ENCOUNTER — OFFICE VISIT (OUTPATIENT)
Dept: DERMATOLOGY | Age: 71
End: 2021-10-18
Payer: MEDICARE

## 2021-10-18 VITALS — TEMPERATURE: 97.6 F

## 2021-10-18 DIAGNOSIS — L82.1 SK (SEBORRHEIC KERATOSIS): Primary | ICD-10-CM

## 2021-10-18 DIAGNOSIS — B35.3 TINEA PEDIS OF LEFT FOOT: ICD-10-CM

## 2021-10-18 DIAGNOSIS — Z85.828 HISTORY OF BASAL CELL CARCINOMA: ICD-10-CM

## 2021-10-18 PROCEDURE — 99213 OFFICE O/P EST LOW 20 MIN: CPT | Performed by: DERMATOLOGY

## 2021-10-18 NOTE — PROGRESS NOTES
[facility]  Mickie Escobar MD  [phone]      Bismark Avalos MD  1950    66 y.o. male     Date of Visit: 10/18/2021    Chief Complaint: skin lesions    History of Present Illness:    1. He complains of a couple of lesions on the back. 2.  He also has an asymptomatic rash on the left foot. 3.  He has a history of a nodular BCC of the left nasal dorsum - treated with Mohs on 12/17/20. He denies any signs of recurrence. Review of Systems:  Gen: Feels well, good sense of health. Past Medical History, Family History, Surgical History, Medications and Allergies reviewed. Past Medical History:   Diagnosis Date    Hearing loss     Hypogonadism in male     Hypothyroidism     Kidney stones     MAO (obstructive sleep apnea)     Type II or unspecified type diabetes mellitus without mention of complication, not stated as uncontrolled      Past Surgical History:   Procedure Laterality Date    CATARACT REMOVAL WITH IMPLANT Bilateral     CHOLECYSTECTOMY      9/11    COLONOSCOPY      2009    COLONOSCOPY  01/14/2021    ,Normal colonoscopy,Diverticulosis. repeat 5-7 years.  THYROIDECTOMY      lt lobectomy 2/2010    TONSILLECTOMY         Allergies   Allergen Reactions    Zosyn [Piperacillin Sod-Tazobactam So]     Demerol      vomiting    Metformin Other (See Comments)     [place]    Metformin And Related      intolerant     Outpatient Medications Marked as Taking for the 10/18/21 encounter (Office Visit) with Jaz Bush MD   Medication Sig Dispense Refill    ciclopirox (LOPROX) 0.77 % cream Apply to the left foot twice daily for 4 weeks.  30 g 2    insulin glargine (LANTUS SOLOSTAR) 100 UNIT/ML injection pen INJECT 50 UNITS SUBCUTANEOUSLY 2 TIMES A DAY  30 mL 3    benazepril (LOTENSIN) 40 MG tablet 1 TABLET BY MOUTH ONCE DAY  90 tablet 3    metroNIDAZOLE (FLAGYL) 500 MG tablet Take 500 mg by mouth 3 times daily       insulin lispro protamine  Blood Glucose Monitoring Suppl (ONE TOUCH ULTRA SYSTEM KIT) W/DEVICE KIT AS DIRECTED 1 kit 0    aspirin EC 81 MG EC tablet Take 1 tablet by mouth daily. 30 tablet 11    Multiple Vitamin (MULTIVITAMIN PO) Take  by mouth daily. Physical Examination       The following were examined and determined to be normal: Psych/Neuro, Scalp/hair, Head/face, Conjunctivae/eyelids, Gums/teeth/lips, Neck, Breast/axilla/chest, Abdomen, Back, RUE, LUE and RLE. The following were examined and determined to be abnormal: LLE and Nails/digits. Well appearing. 1.  Back with 2 stuck on appearing verrucous gray-pink papules. Back with multiple stuck on appearing waxy brown papules and plaques. 2.  Plantar surface of the left foot with diffuse scaling. Few toenails with onychomycosis. 3.  Clear. Assessment and Plan     1. SK (seborrheic keratosis) - multiple    Reassurance. 2. Tinea pedis of left foot     Loprox cream twice daily for 4 weeks. 3. History of basal cell carcinoma of the nose - no signs of recurrence. Sun protective behaviors, including use of at least SPF 30 sunscreen, and self skin examinations were encouraged. Call for any new or concerning lesions. Return in about 1 year (around 10/18/2022).     --Blaine Maldonado MD

## 2021-11-22 RX ORDER — PEN NEEDLE, DIABETIC 31 GX5/16"
NEEDLE, DISPOSABLE MISCELLANEOUS
Qty: 100 EACH | Refills: 11 | Status: SHIPPED | OUTPATIENT
Start: 2021-11-22 | End: 2022-10-13

## 2022-01-03 NOTE — TELEPHONE ENCOUNTER
As per previous note, Vandana Amaya will have a reminder to schedule patient when we return to [facility]. [Negative] : Genitourinary

## 2022-01-05 ENCOUNTER — HOSPITAL ENCOUNTER (OUTPATIENT)
Age: 72
Discharge: HOME | End: 2022-01-05
Payer: MEDICARE

## 2022-01-05 DIAGNOSIS — E03.9 ACQUIRED HYPOTHYROIDISM: ICD-10-CM

## 2022-01-05 DIAGNOSIS — R79.89 ELEVATED SERUM CREATININE: ICD-10-CM

## 2022-01-05 DIAGNOSIS — E55.9 VITAMIN D DEFICIENCY: ICD-10-CM

## 2022-01-05 LAB
A/G RATIO: 1.2 (ref 1.1–2.2)
ALBUMIN SERPL-MCNC: 4.1 G/DL (ref 3.4–5)
ALP BLD-CCNC: 108 U/L (ref 40–129)
ALT SERPL-CCNC: 36 U/L (ref 10–40)
ANION GAP SERPL CALCULATED.3IONS-SCNC: 13 MMOL/L (ref 3–16)
AST SERPL-CCNC: 26 U/L (ref 15–37)
BILIRUB SERPL-MCNC: 0.5 MG/DL (ref 0–1)
BUN BLDV-MCNC: 22 MG/DL (ref 7–20)
CALCIUM SERPL-MCNC: 10 MG/DL (ref 8.3–10.6)
CHLORIDE BLD-SCNC: 103 MMOL/L (ref 99–110)
CHOLESTEROL, TOTAL: 142 MG/DL (ref 0–199)
CO2: 24 MMOL/L (ref 21–32)
CREAT SERPL-MCNC: 1.1 MG/DL (ref 0.8–1.3)
GFR AFRICAN AMERICAN: >60
GFR NON-AFRICAN AMERICAN: >60
GLUCOSE BLD-MCNC: 113 MG/DL (ref 70–99)
HDLC SERPL-MCNC: 28 MG/DL (ref 40–60)
LDL CHOLESTEROL CALCULATED: ABNORMAL MG/DL
LDL CHOLESTEROL DIRECT: 67 MG/DL
POTASSIUM SERPL-SCNC: 4.3 MMOL/L (ref 3.5–5.1)
SODIUM BLD-SCNC: 140 MMOL/L (ref 136–145)
T4 FREE: 1.3 NG/DL (ref 0.9–1.8)
TOTAL PROTEIN: 7.5 G/DL (ref 6.4–8.2)
TRIGL SERPL-MCNC: 311 MG/DL (ref 0–150)
TSH SERPL DL<=0.05 MIU/L-ACNC: 1.1 UIU/ML (ref 0.27–4.2)
VITAMIN D 25-HYDROXY: 49.3 NG/ML
VLDLC SERPL CALC-MCNC: ABNORMAL MG/DL

## 2022-01-05 PROCEDURE — 80061 LIPID PANEL: CPT

## 2022-01-05 PROCEDURE — 80053 COMPREHEN METABOLIC PANEL: CPT

## 2022-01-05 PROCEDURE — 83036 HEMOGLOBIN GLYCOSYLATED A1C: CPT

## 2022-01-05 PROCEDURE — 84443 ASSAY THYROID STIM HORMONE: CPT

## 2022-01-05 PROCEDURE — 36415 COLL VENOUS BLD VENIPUNCTURE: CPT

## 2022-01-05 PROCEDURE — 82306 VITAMIN D 25 HYDROXY: CPT

## 2022-01-05 PROCEDURE — 84439 ASSAY OF FREE THYROXINE: CPT

## 2022-01-06 ENCOUNTER — OFFICE VISIT (OUTPATIENT)
Dept: INTERNAL MEDICINE CLINIC | Age: 72
End: 2022-01-06
Payer: MEDICARE

## 2022-01-06 VITALS
HEART RATE: 70 BPM | DIASTOLIC BLOOD PRESSURE: 62 MMHG | BODY MASS INDEX: 36.62 KG/M2 | SYSTOLIC BLOOD PRESSURE: 119 MMHG | WEIGHT: 261.6 LBS | HEIGHT: 71 IN | OXYGEN SATURATION: 98 %

## 2022-01-06 DIAGNOSIS — E55.9 VITAMIN D DEFICIENCY: ICD-10-CM

## 2022-01-06 DIAGNOSIS — E29.1 PRIMARY MALE HYPOGONADISM: ICD-10-CM

## 2022-01-06 DIAGNOSIS — E78.2 MIXED HYPERLIPIDEMIA: ICD-10-CM

## 2022-01-06 DIAGNOSIS — E11.42 DIABETIC POLYNEUROPATHY ASSOCIATED WITH TYPE 2 DIABETES MELLITUS (HCC): Primary | ICD-10-CM

## 2022-01-06 DIAGNOSIS — I10 ESSENTIAL HYPERTENSION: ICD-10-CM

## 2022-01-06 DIAGNOSIS — I73.9 PVD (PERIPHERAL VASCULAR DISEASE) (HCC): ICD-10-CM

## 2022-01-06 DIAGNOSIS — R09.89 DECREASED PEDAL PULSES: ICD-10-CM

## 2022-01-06 LAB
ESTIMATED AVERAGE GLUCOSE: 200.1 MG/DL
HBA1C MFR BLD: 8.6 %

## 2022-01-06 PROCEDURE — 99214 OFFICE O/P EST MOD 30 MIN: CPT | Performed by: INTERNAL MEDICINE

## 2022-01-06 PROCEDURE — 3052F HG A1C>EQUAL 8.0%<EQUAL 9.0%: CPT | Performed by: INTERNAL MEDICINE

## 2022-01-06 RX ORDER — ATORVASTATIN CALCIUM 20 MG/1
20 TABLET, FILM COATED ORAL DAILY
Qty: 90 TABLET | Refills: 3 | Status: SHIPPED | OUTPATIENT
Start: 2022-01-06

## 2022-01-06 RX ORDER — ERGOCALCIFEROL 1.25 MG/1
CAPSULE ORAL
Qty: 12 CAPSULE | Refills: 3 | Status: SHIPPED | OUTPATIENT
Start: 2022-01-06 | End: 2022-08-03 | Stop reason: DRUGHIGH

## 2022-01-06 ASSESSMENT — ENCOUNTER SYMPTOMS
SHORTNESS OF BREATH: 0
WHEEZING: 0
CHEST TIGHTNESS: 0
TROUBLE SWALLOWING: 0
VOICE CHANGE: 0

## 2022-01-06 NOTE — PROGRESS NOTES
Adelaida Peace MD  1950  male  66 y.o. SUBJECTIVE:       Chief Complaint   Patient presents with    6 Month Follow-Up    Hypertension    Diabetes       HPI:  Follow-up visit for chronic problems. Patient denies any active physical symptoms. He is physically more active doing yoga and going to the gym and also does 10,000 steps a day  He continue to follow-up with the endocrinologist.  He did not have any gout attacks since last visit. Past Medical History:   Diagnosis Date    Hearing loss     Hypogonadism in male     Hypothyroidism     Kidney stones     MAO (obstructive sleep apnea)     Type II or unspecified type diabetes mellitus without mention of complication, not stated as uncontrolled      Past Surgical History:   Procedure Laterality Date    CATARACT REMOVAL WITH IMPLANT Bilateral     CHOLECYSTECTOMY      9/11    COLONOSCOPY      2009    COLONOSCOPY  01/14/2021    ,Normal colonoscopy,Diverticulosis. repeat 5-7 years.  THYROIDECTOMY      lt lobectomy 2/2010    TONSILLECTOMY       Social History     Socioeconomic History    Marital status:      Spouse name: None    Number of children: None    Years of education: None    Highest education level: None   Occupational History    Occupation: retired pediatrician   Tobacco Use    Smoking status: Never Smoker    Smokeless tobacco: Never Used   Vaping Use    Vaping Use: Never used   Substance and Sexual Activity    Alcohol use:  Yes     Alcohol/week: 1.0 standard drink     Types: 1 Cans of beer per week     Comment: 4 of 7 days     Drug use: No    Sexual activity: Never   Other Topics Concern    None   Social History Narrative    None     Social Determinants of Health     Financial Resource Strain: Low Risk     Difficulty of Paying Living Expenses: Not hard at all   Food Insecurity: No Food Insecurity    Worried About Running Out of Food in the Last Year: Never true    [address] in the Last Year: Never true   Transportation Needs:     Lack of Transportation (Medical): Not on file    Lack of Transportation (Non-Medical): Not on file   Physical Activity:     Days of Exercise per Week: Not on file    Minutes of Exercise per Session: Not on file   Stress:     Feeling of Stress : Not on file   Social Connections:     Frequency of Communication with Friends and Family: Not on file    Frequency of Social Gatherings with Friends and Family: Not on file    Attends Synagogue Services: Not on file    Active Member of [address] [place] [address] [employer] or Organizations: Not on file    Attends Club or Organization Meetings: Not on file    Marital Status: Not on file   Intimate Partner Violence:     Fear of Current or Ex-Partner: Not on file    Emotionally Abused: Not on file    Physically Abused: Not on file    Sexually Abused: Not on file   Housing Stability:     Unable to Pay for Housing in the Last Year: Not on file    Number of [place] in the Last Year: Not on file    Unstable Housing in the Last Year: Not on file     Family History   Problem Relation Age of Onset    Cancer Father         skin    Coronary Art Dis Father     Diabetes Father     Coronary Art Dis Paternal Uncle     Diabetes Paternal Uncle     Stroke Paternal Uncle     Heart Disease Neg Hx     High Blood Pressure Neg Hx     Osteoporosis Neg Hx     Thyroid Disease Neg Hx     Heart Attack Neg Hx        Review of Systems   Constitutional: Negative for fatigue and unexpected weight change. HENT: Negative for trouble swallowing and voice change. Respiratory: Negative for chest tightness, shortness of breath and wheezing. Cardiovascular: Negative for chest pain and palpitations. Neurological: Negative for dizziness and light-headedness.         Neuropathic symptoms affecting the stockings area is unchanged       OBJECTIVE:  Pulse Readings from Last 4 Encounters:   01/06/22 70   09/15/21 83   07/06/21 72   06/09/21 75     Wt Readings from Last 4 Encounters:   01/06/22 261 lb 9.6 oz (118.7 kg)   09/15/21 255 lb 9.6 oz (115.9 kg)   07/06/21 254 lb (115.2 kg)   06/09/21 260 lb (117.9 kg)     BP Readings from Last 4 Encounters:   01/06/22 119/62   09/15/21 99/60   07/06/21 124/70   06/09/21 111/61     Physical Exam  Vitals and nursing note reviewed. Constitutional:       Appearance: He is not ill-appearing. Eyes:      Conjunctiva/sclera: Conjunctivae normal.   Cardiovascular:      Rate and Rhythm: Normal rate and regular rhythm. Heart sounds: Normal heart sounds. Comments: Decreased bilateral pulses at posterior tibial and dorsalis pedis, 2/4  Pulmonary:      Effort: Pulmonary effort is normal.      Breath sounds: Normal breath sounds. Musculoskeletal:      Right lower leg: No edema. Left lower leg: No edema. Neurological:      Mental Status: He is alert. Mental status is at baseline.          CBC:   Lab Results   Component Value Date    WBC 9.5 07/14/2021    HGB 14.3 07/14/2021    HCT 42.2 07/14/2021     07/14/2021     CMP:  Lab Results   Component Value Date     01/05/2022    K 4.3 01/05/2022     01/05/2022    CO2 24 01/05/2022    ANIONGAP 13 01/05/2022    GLUCOSE 113 01/05/2022    BUN 22 01/05/2022    CREATININE 1.1 01/05/2022    GFRAA >60 01/05/2022    GFRAA >60 01/19/2013    CALCIUM 10.0 01/05/2022    PROT 7.5 01/05/2022    PROT 7.5 02/16/2013    LABALBU 4.1 01/05/2022    AGRATIO 1.2 01/05/2022    BILITOT 0.5 01/05/2022    ALKPHOS 108 01/05/2022    ALT 36 01/05/2022    AST 26 01/05/2022    GLOB 3.1 09/14/2021     URINALYSIS:  Lab Results   Component Value Date    LABMICR <1.20 06/08/2021     HBA1C:   Lab Results   Component Value Date    LABA1C 8.6 01/05/2022    .1 01/05/2022     MICRO/ALB:   Lab Results   Component Value Date    LABMICR <1.20 06/08/2021    LABCREA 91.1 06/08/2021    MALBCR see below 06/08/2021     LIPID:  Lab Results   Component Value Date    CHOL 142 01/05/2022    TRIG 311 01/05/2022    HDL 28 01/05/2022    HDL 27 03/17/2012    LDLCALC see below 01/05/2022    LABVLDL see below 01/05/2022     TSH:   Lab Results   Component Value Date    TSHREFLEX 0.93 07/01/2020     PSA:   Lab Results   Component Value Date    PSA 2.48 07/14/2021        ASSESSMENT/PLAN:  Assessment/Plan:  Ijeoma James was seen today for 6 month follow-up, hypertension and diabetes. Diagnoses and all orders for this visit:    Diabetic polyneuropathy associated with type 2 diabetes mellitus ([facility]  Patient fasting blood sugar is excellent however A1c 8.6  I advised he should check postprandial blood glucose and let us know. If significantly elevated will need to increase premeal Humalog  Vitamin D deficiency  -     vitamin D (ERGOCALCIFEROL) 1.25 MG (34040 UT) CAPS capsule; 1 CAPSULE EVERY WEEK    Essential hypertension  Patient denies any exertional chest pain, dyspnea, palpitations, syncope, orthopnea, edema or paroxysmal nocturnal dyspnea. The current medical regimen is effective;  continue present plan and medications. Primary male hypogonadism  Continue AndroGel  Mixed hyperlipidemia  We will increase-     atorvastatin (LIPITOR) 20 MG tablet; Take 1 tablet by mouth daily    Decreased pedal pulses    -     VL LOWER EXTREMITY ARTERIAL SEGMENTAL PRESSURES W PPG; Future    PVD (peripheral vascular disease) ([facility]  Patient denies any resting pain or any intermittent claudication.   He continues to have neuropathic symptoms which has been unchanged  -     VL LOWER EXTREMITY ARTERIAL SEGMENTAL PRESSURES W PPG; Future            Orders Placed This Encounter   Procedures    VL LOWER EXTREMITY ARTERIAL SEGMENTAL PRESSURES W PPG     Standing Status:   Future     Standing Expiration Date:   1/6/2023     DIABETES FOOT EXAM     Current Outpatient Medications   Medication Sig Dispense Refill    vitamin D (ERGOCALCIFEROL) 1.25 MG (20306 UT) CAPS capsule 1 CAPSULE EVERY WEEK 12 capsule 3    atorvastatin (LIPITOR) 20 MG tablet Take 1 tablet by mouth daily 90 tablet 3    levothyroxine (SYNTHROID) 100 MCG tablet 1 TABLET BY MOUTH ONCE DAILY (PRESCRIBER OK IS NEEDED FOR NEXT FILL) 90 tablet 0    Insulin Pen Needle (B-D UF III MINI PEN NEEDLES) 31G X 5 MM MISC USE 6 TIMES PER DAY OR AS DIRECTED FOR LANTUS - HUMALOG **MAY REFILL** 100 each 11    ciclopirox (LOPROX) 0.77 % cream Apply to the left foot twice daily for 4 weeks.  30 g 2    insulin glargine (LANTUS SOLOSTAR) 100 UNIT/ML injection pen INJECT 50 UNITS SUBCUTANEOUSLY 2 TIMES A DAY MAY REFILL [email or web address] @@@@@@@NEW RX IN [place]@[email or web address] 30 mL 3    benazepril (LOTENSIN) 40 MG tablet 1 TABLET BY MOUTH ONCE DAY  NO REFILLS-PRESCRIBER OK NEEDED FOR NEXT REFILL  90 tablet 3    Dulaglutide (TRULICITY) 3 ZQ/5.3FZ SOPN Inject 3 mg into the skin once a week 4 pen 5    empagliflozin (JARDIANCE) 25 MG tablet Take 25 mg by mouth daily 30 tablet 11    Continuous Blood Gluc Sensor (FREESTYLE ALBA 14 DAY SENSOR) MISC USE 1 SENSOR EVERY 14 DAYS (PRESCRIBER OK IS NEEDED FOR NEXT FILL) 6 each 3    potassium citrate (UROCIT-K) 10 MEQ (1080 MG) extended release tablet 1 TABLET ONCE DAILY (PRESCRIBER OK IS NEEDED FOR NEXT FILL) 90 tablet 3    omega-3 acid ethyl esters (LOVAZA) 1 g capsule Take 2 capsules by mouth 2 times daily 360 capsule 3    amLODIPine (NORVASC) 10 MG tablet 1 TABLET BY MOUTH ONCE DAILY (NORVASC GENERIC) (PRESCRIBER OK IS NEEDED FOR NEXT FILL) 90 tablet 3    allopurinol (ZYLOPRIM) 300 MG tablet TAKE 1 TABLET ONCE DAILY  (PRESCRIBER OK IS NEEDED FOR NEXT FILL) 90 tablet 3    insulin lispro (HUMALOG KWIKPEN) 200 UNIT/ML SOPN pen INJECT 25 UNITS BEFORE MEALS plus correction 2 units for every 50 of BG above 150 qac 20 pen 3    blood glucose test strips (ONE TOUCH ULTRA TEST) strip qid 400 strip 3    Continuous Blood Gluc  (FREESTYLE ALBA READER) DELMIS 1 Units by Does not apply route as needed (as needed) 1 Device 0    Turmeric 500 MG CAPS Take 1,000 mg by mouth daily      UNABLE TO FIND CPAP supplies, water reservoir and tubing. Dx 327.23 1 each 0    Blood Glucose Monitoring Suppl (ONE TOUCH ULTRA SYSTEM KIT) W/DEVICE KIT AS DIRECTED 1 kit 0    aspirin EC 81 MG EC tablet Take 1 tablet by mouth daily. 30 tablet 11    Multiple Vitamin (MULTIVITAMIN PO) Take  by mouth daily.  Testosterone (ANDROGEL) 20.25 MG/ACT (1.62%) GEL gel APPLY 2 PUMPS TO SKIN ONCE DAILY AS DIRECTED **MAY REFILL** 75 g 3     No current facility-administered medications for this visit. Return in about 6 months (around 7/6/2022) for lab psa, uric acid, CBC. An After Visit Summary was printed and given to the patient. Documentation was done using voice recognition dragon software. Every effort was made to ensure accuracy; however, inadvertent  Unintentional computerized transcription errors may be present.

## 2022-01-10 DIAGNOSIS — I10 ESSENTIAL HYPERTENSION: ICD-10-CM

## 2022-01-10 DIAGNOSIS — M1A.9XX0 CHRONIC GOUT WITHOUT TOPHUS, UNSPECIFIED CAUSE, UNSPECIFIED SITE: ICD-10-CM

## 2022-01-10 RX ORDER — ALLOPURINOL 300 MG/1
TABLET ORAL
Qty: 90 TABLET | Refills: 3 | Status: SHIPPED | OUTPATIENT
Start: 2022-01-10

## 2022-01-10 RX ORDER — POTASSIUM CITRATE 10 MEQ/1
TABLET, EXTENDED RELEASE ORAL
Qty: 90 TABLET | Refills: 3 | Status: SHIPPED | OUTPATIENT
Start: 2022-01-10

## 2022-01-12 ENCOUNTER — OFFICE VISIT (OUTPATIENT)
Dept: ENDOCRINOLOGY | Age: 72
End: 2022-01-12
Payer: MEDICARE

## 2022-01-12 VITALS
TEMPERATURE: 98 F | WEIGHT: 262 LBS | SYSTOLIC BLOOD PRESSURE: 103 MMHG | RESPIRATION RATE: 14 BRPM | HEART RATE: 67 BPM | BODY MASS INDEX: 36.68 KG/M2 | DIASTOLIC BLOOD PRESSURE: 56 MMHG | OXYGEN SATURATION: 98 % | HEIGHT: 71 IN

## 2022-01-12 DIAGNOSIS — E03.9 ACQUIRED HYPOTHYROIDISM: ICD-10-CM

## 2022-01-12 DIAGNOSIS — E78.2 MIXED HYPERLIPIDEMIA: ICD-10-CM

## 2022-01-12 DIAGNOSIS — E55.9 VITAMIN D DEFICIENCY: ICD-10-CM

## 2022-01-12 DIAGNOSIS — R79.89 ELEVATED SERUM CREATININE: ICD-10-CM

## 2022-01-12 DIAGNOSIS — E66.01 CLASS 2 SEVERE OBESITY WITH SERIOUS COMORBIDITY AND BODY MASS INDEX (BMI) OF 36.0 TO 36.9 IN ADULT, UNSPECIFIED OBESITY TYPE (HCC): ICD-10-CM

## 2022-01-12 PROCEDURE — 3052F HG A1C>EQUAL 8.0%<EQUAL 9.0%: CPT | Performed by: INTERNAL MEDICINE

## 2022-01-12 PROCEDURE — 99214 OFFICE O/P EST MOD 30 MIN: CPT | Performed by: INTERNAL MEDICINE

## 2022-01-12 RX ORDER — INSULIN GLARGINE 100 [IU]/ML
INJECTION, SOLUTION SUBCUTANEOUS
Qty: 30 ML | Refills: 3
Start: 2022-01-12 | End: 2022-04-27

## 2022-01-12 RX ORDER — DULAGLUTIDE 4.5 MG/.5ML
4.5 INJECTION, SOLUTION SUBCUTANEOUS WEEKLY
Qty: 12 PEN | Refills: 1 | Status: SHIPPED | OUTPATIENT
Start: 2022-01-12 | End: 2022-07-01

## 2022-01-12 NOTE — PROGRESS NOTES
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 Current symptoms/problems include hyperglycemia and show no change. 1.  Type 2 diabetes, uncontrolled, with neuropathy (HCC) [E11.40, E11.65]    Diagnosed with Type 2 diabetes mellitus in 1994.     Comorbid conditions: hyperlipidemia, HTN and Neuropathy    Current diabetic medications include: Trulicity, Humalog 25 units 3 times daily, Lantus 50 mg twice daily, Jardiance 25 mg daily    Intolerance to diabetes medications: Metformin upset GI     Weight trend: stable  Prior visit with dietician: yes  Current diet: on average, 3 meals per day  Current exercise: Golf twice weekly, with walks daily, swimming twice a week     Current monitoring regimen: home blood tests - 4 times daily  Has brought blood glucose log/meter:  Yes  Home blood sugar records: fasting range: 105 - 135 and postprandial range:  130-140  Any episodes of hypoglycemia? None   Hypoglycemia frequency and time(s):  1-2 episodes per year   Does patient have Glucagon emergency kit? No  Does patient have rapid acting carbohydrate? Yes  Does patient wear a medic alert bracelet or necklace? No    2. Acquired hypothyroidism  Has fatigue. 3. Essential hypertension  No headaches. 4. Mixed hyperlipidemia  No muscle pain. 5. Vitamin D deficiency  Has fatigue. 6. Obesity  On diet, exercise    7. Elevated creatinine  No urination problems      THYROID ULTRASOUND-       INDICATION- Type 2 diabetes, history of hypothyroidism. Patient is   status post left thyroid lobectomy.       FINDINGS-        The right thyroid lobe appears normal, measuring 4.9 x 1.4 x 1.6   cm. Thyroid parenchyma is mildly heterogeneous but no focal lesion   is detected. The left thyroid lobe is not visualized.       IMPRESSION-        1.  Mild heterogeneity of the right thyroid lobe with no focal   lesion detected.       2. Status post left thyroid lobectomy.            Read ByOwen Molina      Released Lorena Garnica        Released Date Time- 02/08/10 1137            Calderon Moran         Lab Results   Component Value Date    LABA1C 8.6 01/05/2022     Lab Results   Component Value Date    .1 01/05/2022           Past Medical History:   Diagnosis Date    Hearing loss     Hypogonadism in male     Hypothyroidism     Kidney stones     MAO (obstructive sleep apnea)     Type II or unspecified type diabetes mellitus without mention of complication, not stated as uncontrolled       Patient Active Problem List   Diagnosis    Diabetic polyneuropathy ([facility]    Acquired hypothyroidism    Mixed hyperlipidemia    Primary male hypogonadism    Vitamin D deficiency    Type 2 diabetes, uncontrolled, with neuropathy ([facility]    Essential hypertension    Obstructive sleep apnea syndrome    Class 2 severe obesity with serious comorbidity and body mass index (BMI) of 35.0 to 35.9 in adult [facility])    Elevated serum creatinine     Past Surgical History:   Procedure Laterality Date    CATARACT REMOVAL WITH IMPLANT Bilateral     CHOLECYSTECTOMY      9/11    COLONOSCOPY      2009    COLONOSCOPY  01/14/2021    ,Normal colonoscopy,Diverticulosis. repeat 5-7 years.  THYROIDECTOMY      lt lobectomy 2/2010    TONSILLECTOMY       Social History     Socioeconomic History    Marital status:       Spouse name: Not on file    Number of children: Not on file    Years of education: Not on file    Highest education level: Not on file   Occupational History    Occupation: retired pediatrician   Tobacco Use    Smoking status: Never Smoker    Smokeless tobacco: Never Used   Vaping Use    Vaping Use: Never used   Substance and Sexual Activity    Alcohol use: Not Currently     Alcohol/week: 1.0 standard drink     Types: 1 Cans of beer per week     Comment: 4 of 7 days     Drug use: No    Sexual activity: Never   Other Topics Concern    Not on file   Social History Narrative    Not on file     Social Determinants of Health     Financial Resource Strain: Low Risk     Difficulty of Paying Living Expenses: Not hard at all   Food Insecurity: No Food Insecurity    Worried About Running Out of Food in the Last Year: Never true    [address] in the Last Year: Never true   Transportation Needs:     Lack of Transportation (Medical): Not on file    Lack of Transportation (Non-Medical):  Not on file   Physical Activity:     Days of Exercise per Week: Not on file    Minutes of Exercise per Session: Not on file   Stress:     Feeling of Stress : Not on file   Social Connections:     Frequency of Communication with Friends and Family: Not on file    Frequency of Social Gatherings with Friends and Family: Not on file    Attends Sikhism Services: Not on file    Active Member of [address] [place] [address] [employer] or Organizations: Not on file    Attends Club or Organization Meetings: Not on file    Marital Status: Not on file   Intimate Partner Violence:     Fear of Current or Ex-Partner: Not on file    Emotionally Abused: Not on file    Physically Abused: Not on file    Sexually Abused: Not on file   Housing Stability:     Unable to Pay for Housing in the Last Year: Not on file    Number of Places Lived in the Last Year: Not on file    Unstable Housing in the Last Year: Not on file     Family History   Problem Relation Age of Onset    Cancer Father         skin    Coronary Art Dis Father     Diabetes Father     Coronary Art Dis Paternal Uncle     Diabetes Paternal Uncle     Stroke Paternal Uncle     Heart Disease Neg Hx     High Blood Pressure Neg Hx     Osteoporosis Neg Hx     Thyroid Disease Neg Hx     Heart Attack Neg Hx        Allergies   Allergen Reactions    Zosyn [Piperacillin Sod-Tazobactam So]     Demerol      vomiting    Metformin Other (See Comments)     [place]    Metformin And Related      intolerant     Family Status   Relation Name Status    Father     Mother  Alive    PUnc  (Not Specified)    Neg Hx  (Not Specified)       Review of Systems  Constitutional: has minimal, no fever, no recent weight gain, no recent weight loss, no changes in appetite  Eyes: no eye pain, bilateral cataracts removal   Ears, nose, throat: no nasal congestion, no sore throat, no earache, no decrease in hearing, no hoarseness, no dry mouth, no sinus problems, no difficulty swallowing, no neck lumps, no dental problems, no mouth sores, no ringing in ears  Pulmonary: no shortness of breath, no wheezing, no dyspnea on exertion, no cough  Cardiovascular: no chest pain, no lower extremity edema, no orthopnea, no intermittent leg claudication, no palpitations  Gastrointestinal: no abdominal pain, no nausea, no vomiting, no diarrhea, no constipation, no dysphagia, no heartburn, no bloating  Genitourinary: no dysuria, no urinary incontinence, no urinary hesitancy, no urinary frequency, no feelings of urinary urgency, no nocturia  Musculoskeletal: no joint swelling, no joint stiffness, no joint pain, no muscle cramps, no muscle pain, no bone pain, no fractures  Integument/Breast:  no skin rashes, no skin lesions, no itching, no dry skin  Neurological: Has numbness, has tingling, no weakness, no confusion, no headaches, no dizziness, no fainting, no tremors, no decrease in memory, no balance problems  Psychiatric: no anxiety, no depression, no insomnia  Hematologic/Lymphatic: no tendency for easy bleeding, no swollen lymph nodes, no tendency for easy bruising  Immunology: has seasonal allergies, no frequent infections, no frequent illnesses  Endocrine: no temperature intolerance     OBJECTIVE:  BP (!) 103/56   Pulse 67   Temp 98 °F (36.7 °C)   Resp 14   Ht 5' 11\" (1.803 m)   Wt 262 lb (118.8 kg)   SpO2 98%   BMI 36.54 kg/m²      Wt Readings from Last 3 Encounters:   22 262 lb (118.8 kg)   22 261 lb 9.6 oz (118.7 kg)   09/15/21 255 lb 9.6 oz (115.9 kg) Constitutional: no acute distress, well appearing and well nourished  Psychiatric: oriented to person, place and time, judgement and insight and normal, recent and remote memory and intact and mood and affect are normal  Skin: skin and subcutaneous tissue is normal without mass, normal turgor  Head and Face: examination of head and face revealed no abnormalities  Eyes: no lid or conjunctival swelling, erythema or discharge, pupils are normal, equal, round, reactive to light  Ears/Nose: external inspection of ears and nose revealed no abnormalities, hearing is grossly normal  Oropharynx/Mouth/Face: lips, tongue and gums are normal with no lesions, the voice quality was normal  Neck: neck is supple and symmetric, with midline trachea and no masses, thyroid left lobe absent, right lobe normal  Lymphatics: normal cervical lymph nodes, normal supraclavicular nodes  Pulmonary: no increased work of breathing or signs of respiratory distress, lungs are clear to auscultation  Cardiovascular: normal heart rate and rhythm, normal S1 and S2, no murmurs and pedal pulses and 2+ bilaterally, No edema  Abdomen: abdomen is soft, non-tender with no masses  Musculoskeletal: normal gait and station and exam of the digits and nails are normal  Neurological: normal coordination and normal general cortical function    Visual inspection: 9/2019 appointment with optho  Deformity/amputation: absent  Skin lesions/pre-ulcerative calluses: absent  Edema: right- negative, left- negative    Visual inspection:   Deformity/amputation: absent  Skin lesions/pre-ulcerative calluses: absent  Edema: right- negative, left- negative     Sensory exam:   Monofilament sensation: abnormal  (minimum of 5 random plantar locations tested, avoiding callused areas - > 1 area with absence of sensation is + for neuropathy)  High risk feet     Plus at least one of the following:  Pulses: decreased  Proprioception: Impaired  Vibration (128 Hz): Absent    ASSESSMENT/PLAN:    1. Type 2 diabetes, uncontrolled, with neuropathy (HCC)    Hemoglobin A1c 10.1-7.8-8.5-8.2-7.6-8.7-8.3-8.6  Patient states he is eating better. On low carb diet. Exercises. - continue medications   - Lantus  55 units bid  - Humalog 25 units qac plus correction 2 units for every 50 of BG above 150 qac  - Increase Trulicity to 4.5 mg weekly  - Jardiance 25 mg daily    2. Acquired hypothyroidism  - Levothyroxine 0.1 mg   - TSH 0.68-1.15-0.55-1.91-1.10    3. Essential hypertension  Same medications  Hypertension well controlled     4. Mixed hyperlipidemia  HDL 27-27-28  LDL [id number]  [phone]  - Atorvastatin   - LDL well controlled     5. Vitamin D deficiency  25OH vit D 45-52-65.8-64.1-56.6-50.5-49. 3  Weekly Supplement  - Vitamin D levels within normal limits     6. Obesity  Diet, exercise    7. Elevated creatinine  Follow-up  Creatinine 1.5 -1.1-1.2-1.1-1.2-1.1  GFR>60-59->60  Follow with family doctor    Reviewed and/or ordered clinical lab results Yes  Reviewed and/or ordered radiology tests Yes  Reviewed and/or ordered other diagnostic tests No  Discussed test results with performing physician No  Independently reviewed image, tracing, or specimen No  Made a decision to obtain old records No  Reviewed old records Yes  Obtained history from other than patient No    Ngozi Malave MD was counseled regarding symptoms of diabetes diagnosis, course and complications of disease if inadequately treated, side effects of medications, diagnosis, treatment options, and prognosis, risks, benefits, complications, and alternatives of treatment, labs, imaging and other studies and treatment targets and goals. He understands instructions and counseling. Return in about 3 months (around 4/12/2022) for diabetes, thyroid problems.

## 2022-02-01 DIAGNOSIS — I10 ESSENTIAL HYPERTENSION: ICD-10-CM

## 2022-02-01 RX ORDER — AMLODIPINE BESYLATE 10 MG/1
TABLET ORAL
Qty: 90 TABLET | Refills: 3 | Status: SHIPPED | OUTPATIENT
Start: 2022-02-01

## 2022-02-07 ENCOUNTER — HOSPITAL ENCOUNTER (OUTPATIENT)
Dept: VASCULAR LAB | Age: 72
Discharge: HOME | End: 2022-02-07
Payer: MEDICARE

## 2022-02-07 DIAGNOSIS — R09.89 DECREASED PEDAL PULSES: ICD-10-CM

## 2022-02-07 DIAGNOSIS — I73.9 PVD (PERIPHERAL VASCULAR DISEASE) (HCC): ICD-10-CM

## 2022-02-07 DIAGNOSIS — I73.9 PVD (PERIPHERAL VASCULAR DISEASE) (HCC): Primary | ICD-10-CM

## 2022-02-07 PROCEDURE — 93923 UPR/LXTR ART STDY 3+ LVLS: CPT

## 2022-02-07 NOTE — RESULT ENCOUNTER NOTE
Calcified blood vessels along with abnormal circulation labs  The reading physician recommended a consult with a vascular surgeon.   Referral order for Dr. Ying Quinones is placed

## 2022-02-16 DIAGNOSIS — E29.1 TESTOSTERONE DEFICIENCY IN MALE: ICD-10-CM

## 2022-02-16 RX ORDER — TESTOSTERONE 16.2 MG/G
GEL TRANSDERMAL
Qty: 75 G | Refills: 3 | Status: SHIPPED | OUTPATIENT
Start: 2022-02-16 | End: 2022-10-13

## 2022-03-09 ENCOUNTER — OFFICE VISIT (OUTPATIENT)
Dept: VASCULAR SURGERY | Age: 72
End: 2022-03-09
Payer: MEDICARE

## 2022-03-09 VITALS
DIASTOLIC BLOOD PRESSURE: 80 MMHG | SYSTOLIC BLOOD PRESSURE: 124 MMHG | BODY MASS INDEX: 36.26 KG/M2 | HEIGHT: 71 IN | WEIGHT: 259 LBS

## 2022-03-09 DIAGNOSIS — I73.9 PVD (PERIPHERAL VASCULAR DISEASE) (HCC): Primary | ICD-10-CM

## 2022-03-09 PROCEDURE — 99203 OFFICE O/P NEW LOW 30 MIN: CPT | Performed by: SURGERY

## 2022-03-09 RX ORDER — FLASH GLUCOSE SENSOR
KIT MISCELLANEOUS
Qty: 2 EACH | Refills: 1 | Status: SHIPPED | OUTPATIENT
Start: 2022-03-09 | End: 2022-05-09

## 2022-03-09 NOTE — LETTER
[facility]) - Vascular and Endovascular Surgeons  [address] [address]  Phone: [phone]  Fax: [phone]           Alfred Nettles MD      March 9, 2022     Patient: Becky Tinoco MD   MR Number: [id number]   YOB: 1950   Date of Visit: 3/9/2022       Dear Dr. Annelise Colunga:    Thank you for referring Juju Meza to me for evaluation/treatment. Below are the relevant portions of my assessment and plan of care. If you have questions, please do not hesitate to call me. I look forward to following [facility] along with you.     Sincerely,        Alfred Nettles MD    CC providers:  Jose Luis Lawrence MD  [id number] [id number]  [address] [place]

## 2022-03-09 NOTE — PROGRESS NOTES
[facility]  Consultation Note    Chief Complaint / Reason for Consultation  Abnormal DEBBY    History of Present Illness  Patient is a 66 y.o. male with history of diabetes presents today in referral from his primary care physician Dr. Polo Shah for abnormal peripheral testing. He is a retired pediatrician here in town. He has 15-year history of type 2 diabetes. He denies claudication. He denies lower extremity ulceration. Does have some mild intermittent numbness and tingling consistent with early neuropathy. No other complaints today    Review of Systems     Denies fevers, chills, chest pain, shortness of breath, nausea, vomiting, hematemesis, diarrhea, constipation, melena, hematochezia, wt changes, vision problems, blindness, hearing problems, facial droop, slurred speech, extremity weakness, extremity numbness, dysuria. Past Medical History:   Diagnosis Date    Hearing loss     Hypogonadism in male     Hypothyroidism     Kidney stones     MAO (obstructive sleep apnea)     Type II or unspecified type diabetes mellitus without mention of complication, not stated as uncontrolled        Past Surgical History:   Procedure Laterality Date    CATARACT REMOVAL WITH IMPLANT Bilateral     CHOLECYSTECTOMY      9/11    COLONOSCOPY      2009    COLONOSCOPY  01/14/2021    ,Normal colonoscopy,Diverticulosis. repeat 5-7 years.  THYROIDECTOMY      lt lobectomy 2/2010    TONSILLECTOMY         Allergies   Allergen Reactions    Zosyn [Piperacillin Sod-Tazobactam So]     Demerol      vomiting    Metformin Other (See Comments)     [place]    Metformin And Related      intolerant       Social History     Socioeconomic History    Marital status:       Spouse name: Not on file    Number of children: Not on file    Years of education: Not on file    Highest education level: Not on file   Occupational History    Occupation: retired pediatrician   Tobacco Use    Smoking status: Never Smoker    Smokeless tobacco: Never Used   Vaping Use    Vaping Use: Never used   Substance and Sexual Activity    Alcohol use: Not Currently     Alcohol/week: 1.0 standard drink     Types: 1 Cans of beer per week     Comment: 4 of 7 days     Drug use: No    Sexual activity: Never   Other Topics Concern    Not on file   Social History Narrative    Not on file     Social Determinants of Health     Financial Resource Strain: Low Risk     Difficulty of Paying Living Expenses: Not hard at all   Food Insecurity: No Food Insecurity    Worried About Running Out of Food in the Last Year: Never true    Lonny of Food in the Last Year: Never true   Transportation Needs:     Lack of Transportation (Medical): Not on file    Lack of Transportation (Non-Medical):  Not on file   Physical Activity:     Days of Exercise per Week: Not on file    Minutes of Exercise per Session: Not on file   Stress:     Feeling of Stress : Not on file   Social Connections:     Frequency of Communication with Friends and Family: Not on file    Frequency of Social Gatherings with Friends and Family: Not on file    Attends Zoroastrian Services: Not on file    Active Member of [address] [place] [address] [employer] or Organizations: Not on file    Attends Club or Organization Meetings: Not on file    Marital Status: Not on file   Intimate Partner Violence:     Fear of Current or Ex-Partner: Not on file    Emotionally Abused: Not on file    Physically Abused: Not on file    Sexually Abused: Not on file   Housing Stability:     Unable to Pay for Housing in the Last Year: Not on file    Number of [place] in the Last Year: Not on file    Unstable Housing in the Last Year: Not on file       Family History   Problem Relation Age of Onset    Cancer Father         skin    Coronary Art Dis Father     Diabetes Father     Coronary Art Dis Paternal Uncle     Diabetes Paternal Uncle     Stroke Paternal Uncle     Heart Disease Neg Hx     High Blood Pressure Neg Hx     Osteoporosis Neg Hx     Thyroid Disease Neg Hx     Heart Attack Neg Hx      - No history of bleeding or clotting disorders    Vital Signs  There were no vitals filed for this visit. Physical Examination  General:  no apparent distress  Psychiatric: affect appropriate  Neck:  supple  Chest/Lungs: clear to auscultation bilaterally  Extremities: warm and well perfused  Patient with palpable DP and PT pulses bilaterally. Feet are warm. Less than 2-second capillary refill. No ulceration. Motor and sensation grossly intact    Labs  Lab Results   Component Value Date    WBC 9.5 07/14/2021    HGB 14.3 07/14/2021    HCT 42.2 07/14/2021    MCV 88.9 07/14/2021     07/14/2021     Lab Results   Component Value Date     01/05/2022    K 4.3 01/05/2022     01/05/2022    CO2 24 01/05/2022    PHOS 4.5 07/13/2020    BUN 22 01/05/2022    CREATININE 1.1 01/05/2022      No results found for: GLU    Imaging:        Right Impression   There is an DEBBY of 1.32 on the right. This is indicative of calcified vessels   and a falsely elevated DEBBY. Right first toe/brachial index 0.61 : this is   within normal range . Right continuous wave Doppler tracings, pulse volume recordings and segmental   pressures demonstrate moderate tibial runoff disease . Photoplethysmography (PPG) on the right 5 digit(s) demonstrates moderately   diminished waveforms . Left Impression   There is an DEBBY of 1.45 on the left. This is indicative of calcified vessels   and a falsely elevated DEBBY. Left first toe/brachial index 0.57: This is   abnormal.   Left continuous wave Doppler tracings, pulse volume recording and segmental   pressures demonstrate moderate tibial runoff disease . Photoplethysmography (PPG) on the left 5 digit(s) demonstrates moderately   diminished waveforms .          Assessment:   Medial calcification secondary to type 2 diabetes      Plan:  41-year-old male with medial calcification secondary to type 2 diabetes with falsely elevated DEBBY. He has normal distal perfusion. He does have palpable pulses today in the office. His pulse volume recordings are normal consistent with good distal flow. The findings were reviewed in detail with him today. All questions answered. No need for further testing or intervention at this time. Signs and symptoms of worsening arterial disease were reviewed with him today      Marcelino Rondon M.D., FACS.  3/9/2022  8:52 AM

## 2022-03-15 DIAGNOSIS — E03.9 ACQUIRED HYPOTHYROIDISM: ICD-10-CM

## 2022-03-15 RX ORDER — LEVOTHYROXINE SODIUM 0.1 MG/1
TABLET ORAL
Qty: 90 TABLET | Refills: 0 | Status: SHIPPED | OUTPATIENT
Start: 2022-03-15 | End: 2022-07-18

## 2022-03-16 NOTE — TELEPHONE ENCOUNTER
Pt stated that has started an exercise regime. He is watching his CHO and is working on getting his A1c lower. He is wanting all his meds to be filled through Dr. Daniel Chao.  Dr Madyson Ellsworth did fill his thyroid med yesterday.

## 2022-03-21 PROCEDURE — 3052F HG A1C>EQUAL 8.0%<EQUAL 9.0%: CPT | Performed by: INTERNAL MEDICINE

## 2022-03-21 RX ORDER — INSULIN LISPRO 200 [IU]/ML
INJECTION, SOLUTION SUBCUTANEOUS
Qty: 12 ML | Refills: 0 | Status: SHIPPED | OUTPATIENT
Start: 2022-03-21 | End: 2022-05-09

## 2022-04-21 RX ORDER — EMPAGLIFLOZIN 25 MG/1
TABLET, FILM COATED ORAL
Qty: 30 TABLET | Refills: 11 | Status: SHIPPED | OUTPATIENT
Start: 2022-04-21

## 2022-04-26 DIAGNOSIS — E55.9 VITAMIN D DEFICIENCY: ICD-10-CM

## 2022-04-26 DIAGNOSIS — R79.89 ELEVATED SERUM CREATININE: ICD-10-CM

## 2022-04-26 DIAGNOSIS — E78.2 MIXED HYPERLIPIDEMIA: ICD-10-CM

## 2022-04-26 DIAGNOSIS — E03.9 ACQUIRED HYPOTHYROIDISM: ICD-10-CM

## 2022-04-26 LAB
A/G RATIO: 2 (ref 1.1–2.2)
ALBUMIN SERPL-MCNC: 4.4 G/DL (ref 3.4–5)
ALP BLD-CCNC: 101 U/L (ref 40–129)
ALT SERPL-CCNC: 21 U/L (ref 10–40)
ANION GAP SERPL CALCULATED.3IONS-SCNC: 13 MMOL/L (ref 3–16)
AST SERPL-CCNC: 17 U/L (ref 15–37)
BILIRUB SERPL-MCNC: 0.4 MG/DL (ref 0–1)
BUN BLDV-MCNC: 17 MG/DL (ref 7–20)
CALCIUM SERPL-MCNC: 9.4 MG/DL (ref 8.3–10.6)
CHLORIDE BLD-SCNC: 107 MMOL/L (ref 99–110)
CHOLESTEROL, TOTAL: 101 MG/DL (ref 0–199)
CO2: 22 MMOL/L (ref 21–32)
CREAT SERPL-MCNC: 1.2 MG/DL (ref 0.8–1.3)
CREATININE URINE: 158.1 MG/DL (ref 39–259)
GFR AFRICAN AMERICAN: >60
GFR NON-AFRICAN AMERICAN: 59
GLUCOSE BLD-MCNC: 77 MG/DL (ref 70–99)
HDLC SERPL-MCNC: 26 MG/DL (ref 40–60)
LDL CHOLESTEROL CALCULATED: 40 MG/DL
MICROALBUMIN UR-MCNC: <1.2 MG/DL
MICROALBUMIN/CREAT UR-RTO: NORMAL MG/G (ref 0–30)
POTASSIUM SERPL-SCNC: 4 MMOL/L (ref 3.5–5.1)
SODIUM BLD-SCNC: 142 MMOL/L (ref 136–145)
T3 FREE: 2.7 PG/ML (ref 2.3–4.2)
T4 FREE: 1.6 NG/DL (ref 0.9–1.8)
TOTAL PROTEIN: 6.6 G/DL (ref 6.4–8.2)
TRIGL SERPL-MCNC: 176 MG/DL (ref 0–150)
TSH SERPL DL<=0.05 MIU/L-ACNC: 0.38 UIU/ML (ref 0.27–4.2)
VITAMIN D 25-HYDROXY: 57.4 NG/ML
VLDLC SERPL CALC-MCNC: 35 MG/DL

## 2022-04-27 ENCOUNTER — OFFICE VISIT (OUTPATIENT)
Dept: ENDOCRINOLOGY | Age: 72
End: 2022-04-27
Payer: MEDICARE

## 2022-04-27 VITALS
HEIGHT: 71 IN | RESPIRATION RATE: 14 BRPM | WEIGHT: 255 LBS | TEMPERATURE: 98 F | DIASTOLIC BLOOD PRESSURE: 50 MMHG | SYSTOLIC BLOOD PRESSURE: 85 MMHG | HEART RATE: 97 BPM | OXYGEN SATURATION: 99 % | BODY MASS INDEX: 35.7 KG/M2

## 2022-04-27 DIAGNOSIS — E66.01 CLASS 2 SEVERE OBESITY WITH SERIOUS COMORBIDITY AND BODY MASS INDEX (BMI) OF 36.0 TO 36.9 IN ADULT, UNSPECIFIED OBESITY TYPE (HCC): ICD-10-CM

## 2022-04-27 DIAGNOSIS — R79.89 ELEVATED SERUM CREATININE: ICD-10-CM

## 2022-04-27 DIAGNOSIS — I10 ESSENTIAL HYPERTENSION: Chronic | ICD-10-CM

## 2022-04-27 DIAGNOSIS — E55.9 VITAMIN D DEFICIENCY: ICD-10-CM

## 2022-04-27 DIAGNOSIS — E03.9 ACQUIRED HYPOTHYROIDISM: ICD-10-CM

## 2022-04-27 DIAGNOSIS — E78.2 MIXED HYPERLIPIDEMIA: ICD-10-CM

## 2022-04-27 LAB
ESTIMATED AVERAGE GLUCOSE: 180 MG/DL
HBA1C MFR BLD: 7.9 %

## 2022-04-27 PROCEDURE — 3051F HG A1C>EQUAL 7.0%<8.0%: CPT | Performed by: INTERNAL MEDICINE

## 2022-04-27 PROCEDURE — 95251 CONT GLUC MNTR ANALYSIS I&R: CPT | Performed by: INTERNAL MEDICINE

## 2022-04-27 PROCEDURE — 99214 OFFICE O/P EST MOD 30 MIN: CPT | Performed by: INTERNAL MEDICINE

## 2022-04-27 RX ORDER — INSULIN GLARGINE 100 [IU]/ML
INJECTION, SOLUTION SUBCUTANEOUS
Qty: 30 ML | Refills: 3
Start: 2022-04-27 | End: 2022-05-09

## 2022-04-27 NOTE — PROGRESS NOTES
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 Current symptoms/problems include hyperglycemia and show no change. 1.  Type 2 diabetes, uncontrolled, with neuropathy (HCC) [E11.40, E11.65]    Diagnosed with Type 2 diabetes mellitus in 1994.     Comorbid conditions: hyperlipidemia, HTN and Neuropathy    Current diabetic medications include: Trulicity, Humalog 25 units 3 times daily, Lantus 50 mg twice daily, Jardiance 25 mg daily    Intolerance to diabetes medications: Metformin upset GI     Weight trend: stable  Prior visit with dietician: yes  Current diet: on average, 3 meals per day  Current exercise: Golf twice weekly, with walks daily, swimming twice a week     Current monitoring regimen: home blood tests - 4 times daily  Has brought blood glucose log/meter:  Yes  Home blood sugar records: fasting range: 105 - 135 and postprandial range:  130-140  Any episodes of hypoglycemia? None   Hypoglycemia frequency and time(s):  1-2 episodes per year   Does patient have Glucagon emergency kit? No  Does patient have rapid acting carbohydrate? Yes  Does patient wear a medic alert bracelet or necklace? No    2. Acquired hypothyroidism  Has fatigue. 3. Essential hypertension  No headaches. 4. Mixed hyperlipidemia  No muscle pain. 5. Vitamin D deficiency  Has fatigue. 6. Obesity  On diet, exercise    7. Elevated creatinine  No urination problems      THYROID ULTRASOUND-       INDICATION- Type 2 diabetes, history of hypothyroidism. Patient is   status post left thyroid lobectomy.       FINDINGS-        The right thyroid lobe appears normal, measuring 4.9 x 1.4 x 1.6   cm. Thyroid parenchyma is mildly heterogeneous but no focal lesion   is detected. The left thyroid lobe is not visualized.       IMPRESSION-        1.  Mild heterogeneity of the right thyroid lobe with no focal   lesion detected.       2. Status post left thyroid lobectomy.            Read Presley Palaciot      Released Vicki Girard        Released Date Time- 02/08/10 1137            - Do Boone         Lab Results   Component Value Date    LABA1C 7.9 04/26/2022     Lab Results   Component Value Date    .0 04/26/2022           Past Medical History:   Diagnosis Date    Hearing loss     Hypogonadism in male     Hypothyroidism     Kidney stones     MAO (obstructive sleep apnea)     Type II or unspecified type diabetes mellitus without mention of complication, not stated as uncontrolled       Patient Active Problem List   Diagnosis    [facility]uropathy [facility] [address]    Acquired hypothyroidism    Mixed hyperlipidemia    Primary male hypogonadism    Vitamin D deficiency    Type 2 diabetes, uncontrolled, with neuropathy ([facility]    Essential hypertension    Obstructive sleep apnea syndrome    Class 2 severe obesity with serious comorbidity and body mass index (BMI) of 36.0 to 36.9 in adult [facility])    Elevated serum creatinine     Past Surgical History:   Procedure Laterality Date    CATARACT REMOVAL WITH IMPLANT Bilateral     CHOLECYSTECTOMY      9/11    COLONOSCOPY      2009    COLONOSCOPY  01/14/2021    ,Normal colonoscopy,Diverticulosis. repeat 5-7 years.  THYROIDECTOMY      lt lobectomy 2/2010    TONSILLECTOMY       Social History     Socioeconomic History    Marital status:       Spouse name: Not on file    Number of children: Not on file    Years of education: Not on file    Highest education level: Not on file   Occupational History    Occupation: retired pediatrician   Tobacco Use    Smoking status: Never Smoker    Smokeless tobacco: Never Used   Vaping Use    Vaping Use: Never used   Substance and Sexual Activity    Alcohol use: Not Currently     Alcohol/week: 1.0 standard drink     Types: 1 Cans of beer per week     Comment: 4 of 7 days     Drug use: No    Sexual activity: Never   Other Topics Concern    Not on file   Social History Narrative    Not on file     Social Determinants of Health     Financial Resource Strain: Low Risk     Difficulty of Paying Living Expenses: Not hard at all   Food Insecurity: No Food Insecurity    Worried About Running Out of Food in the Last Year: Never true    [address] in the Last Year: Never true   Transportation Needs:     Lack of Transportation (Medical): Not on file    Lack of Transportation (Non-Medical):  Not on file   Physical Activity:     Days of Exercise per Week: Not on file    Minutes of Exercise per Session: Not on file   Stress:     Feeling of Stress : Not on file   Social Connections:     Frequency of Communication with Friends and Family: Not on file    Frequency of Social Gatherings with Friends and Family: Not on file    Attends Protestant Services: Not on file    Active Member of [address] [place] [address] [employer] or Organizations: Not on file    Attends Club or Organization Meetings: Not on file    Marital Status: Not on file   Intimate Partner Violence:     Fear of Current or Ex-Partner: Not on file    Emotionally Abused: Not on file    Physically Abused: Not on file    Sexually Abused: Not on file   Housing Stability:     Unable to Pay for Housing in the Last Year: Not on file    Number of Places Lived in the Last Year: Not on file    Unstable Housing in the Last Year: Not on file     Family History   Problem Relation Age of Onset    Cancer Father         skin    Coronary Art Dis Father     Diabetes Father     Coronary Art Dis Paternal Uncle     Diabetes Paternal Uncle     Stroke Paternal Uncle     Heart Disease Neg Hx     High Blood Pressure Neg Hx     Osteoporosis Neg Hx     Thyroid Disease Neg Hx     Heart Attack Neg Hx        Allergies   Allergen Reactions    Zosyn [Piperacillin Sod-Tazobactam So]     Demerol      vomiting    Metformin Other (See Comments)     [place]    Metformin And Related      intolerant     Family Status   Relation Name Status    Father     Mother  Alive    PUnc  (Not Specified)    Neg Hx  (Not Specified)       Review of Systems  Constitutional: has minimal, no fever, no recent weight gain, no recent weight loss, no changes in appetite  Eyes: no eye pain, bilateral cataracts removal   Ears, nose, throat: no nasal congestion, no sore throat, no earache, no decrease in hearing, no hoarseness, no dry mouth, no sinus problems, no difficulty swallowing, no neck lumps, no dental problems, no mouth sores, no ringing in ears  Pulmonary: no shortness of breath, no wheezing, no dyspnea on exertion, no cough  Cardiovascular: no chest pain, no lower extremity edema, no orthopnea, no intermittent leg claudication, no palpitations  Gastrointestinal: no abdominal pain, no nausea, no vomiting, no diarrhea, no constipation, no dysphagia, no heartburn, no bloating  Genitourinary: no dysuria, no urinary incontinence, no urinary hesitancy, no urinary frequency, no feelings of urinary urgency, no nocturia  Musculoskeletal: no joint swelling, no joint stiffness, no joint pain, no muscle cramps, no muscle pain, no bone pain, no fractures  Integument/Breast:  no skin rashes, no skin lesions, no itching, no dry skin  Neurological: Has numbness, has tingling, no weakness, no confusion, no headaches, no dizziness, no fainting, no tremors, no decrease in memory, no balance problems  Psychiatric: no anxiety, no depression, no insomnia  Hematologic/Lymphatic: no tendency for easy bleeding, no swollen lymph nodes, no tendency for easy bruising  Immunology: has seasonal allergies, no frequent infections, no frequent illnesses  Endocrine: no temperature intolerance     OBJECTIVE:  BP (!) 85/50   Pulse 97   Temp 98 °F (36.7 °C)   Resp 14   Ht 5' 11\" (1.803 m)   Wt 255 lb (115.7 kg)   SpO2 99%   BMI 35.57 kg/m²      Wt Readings from Last 3 Encounters:   22 255 lb (115.7 kg)   22 259 lb (117.5 kg)   22 262 lb (118.8 kg)       Constitutional: no acute distress, well appearing and well nourished  Psychiatric: oriented to person, place and time, judgement and insight and normal, recent and remote memory and intact and mood and affect are normal  Skin: skin and subcutaneous tissue is normal without mass, normal turgor  Head and Face: examination of head and face revealed no abnormalities  Eyes: no lid or conjunctival swelling, erythema or discharge, pupils are normal, equal, round, reactive to light  Ears/Nose: external inspection of ears and nose revealed no abnormalities, hearing is grossly normal  Oropharynx/Mouth/Face: lips, tongue and gums are normal with no lesions, the voice quality was normal  Neck: neck is supple and symmetric, with midline trachea and no masses, thyroid left lobe absent, right lobe normal  Lymphatics: normal cervical lymph nodes, normal supraclavicular nodes  Pulmonary: no increased work of breathing or signs of respiratory distress, lungs are clear to auscultation  Cardiovascular: normal heart rate and rhythm, normal S1 and S2, no murmurs and pedal pulses and 2+ bilaterally, No edema  Abdomen: abdomen is soft, non-tender with no masses  Musculoskeletal: normal gait and station and exam of the digits and nails are normal  Neurological: normal coordination and normal general cortical function    Visual inspection:   Deformity/amputation: absent  Skin lesions/pre-ulcerative calluses: absent  Edema: right- negative, left- negative     Sensory exam:   Monofilament sensation: abnormal  (minimum of 5 random plantar locations tested, avoiding callused areas - > 1 area with absence of sensation is + for neuropathy)  High risk feet     Plus at least one of the following:  Pulses: decreased  Proprioception: Impaired  Vibration (128 Hz): Absent    ASSESSMENT/PLAN:    1. Type 2 diabetes, uncontrolled, with neuropathy (HCC)    Hemoglobin A1c 10.1-7.8-8.5-8.2-7.6-8.7-8.3-8.6-7.9  Patient states he is eating better. On low carb diet. Exercises.   - continue medications   - Lantus  50 units bid  - Humalog 25 units qac plus correction 2 units for every 50 of BG above 150 qac  - Increase Trulicity to 4.5 mg weekly  - Jardiance 25 mg daily    2. Acquired hypothyroidism  - Levothyroxine 0.1 mg   - TSH 0.68-1.15-0.55-1.91-1.10-0.38    3. Essential hypertension  Same medications  Hypertension well controlled     4. Mixed hyperlipidemia  HDL 27-27-28-26  LDL [id number]  [phone]  - Atorvastatin   - LDL well controlled     5. Vitamin D deficiency  25OH vit D 45-52-65.8-64.1-56.6-50.5-49.3-57.4  Weekly Supplement  - Vitamin D levels within normal limits     6. Obesity  Diet, exercise    7. 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 He understands instructions and counseling. CGMS Download Review and Recommendations  See scanned document for BG tracing documentation  [facility] personal CGMS data downloaded and reviewed.   This was separate service provided - CGM interpretation    Average glucose 152 ±32.9 SD  Time in range: 72 %  Time above 180:26  %  Time under 70: 2 %     Basal pattern review: basal hyperglycemia  Postprandial pattern review: postprandial hyperglycemia  Hypoglycemia review: rare hypoglycemia  Activity related review: not recorded      Based on the data, I recommend:    1. Do not eat late dinner    2. Decrease carbs and increase protein for breakfast    3. Continue same insulin    4. Hypoglycemia management    5. Eat snack before or after exercise      Return in about 3 months (around 7/27/2022) for diabetes.

## 2022-05-09 RX ORDER — FLASH GLUCOSE SENSOR
KIT MISCELLANEOUS
Qty: 6 EACH | Refills: 1 | Status: SHIPPED | OUTPATIENT
Start: 2022-05-09 | End: 2022-11-02

## 2022-05-09 RX ORDER — INSULIN GLARGINE 100 [IU]/ML
INJECTION, SOLUTION SUBCUTANEOUS
Qty: 50 PEN | Refills: 1 | Status: SHIPPED | OUTPATIENT
Start: 2022-05-09 | End: 2022-10-03

## 2022-05-09 RX ORDER — INSULIN LISPRO 200 [IU]/ML
INJECTION, SOLUTION SUBCUTANEOUS
Qty: 60 PEN | Refills: 1 | Status: SHIPPED | OUTPATIENT
Start: 2022-05-09 | End: 2022-10-13

## 2022-05-09 NOTE — TELEPHONE ENCOUNTER
Medication:   Requested Prescriptions     Pending Prescriptions Disp Refills    insulin glargine (LANTUS SOLOSTAR) 100 UNIT/ML injection pen [Pharmacy Med Name: LANTUS SOLOSTAR 100 UNITS/M 100 Solution Pen-injector] 30 mL 3     Sig: INJECT 50 UNITS SUBCUTANEOUSLY 2 TIMES A DAY (PRESCRIBER OK IS NEEDED FOR NEXT FILL)    HUMALOG KWIKPEN 200 UNIT/ML SOPN pen [Pharmacy Med Name: Radha Meyer 200U/ML [email or web address]  Solution Pen-injector] 12 mL 0     Sig: INJECT 25 UNITS BEFORE MEALS AS DIRECTED PLUS CORRECTION 2 UNITS FOR EVERY 50 OF BLOOD GLUCOSE ABOVE 150 (PRESCRIBER OK IS NEEDED FOR NEXT FILL)    Continuous Blood Gluc Sensor (FREESTYLE ALBA 14 DAY SENSOR) [address] [facility] [Pharmacy Med Name: [address] 2 each 2     Sig: USE 1 SENSOR EVERY 14 DAYS (PRESCRIBER OK IS NEEDED FOR NEXT FILL)       Last Filled:      Patient Phone Number: [phone] (home)     Last appt: 4/27/2022   Next appt: 8/3/2022    Last Labs DM:   Lab Results   Component Value Date    LABA1C 7.9 04/26/2022

## 2022-06-10 DIAGNOSIS — E11.42 DIABETIC POLYNEUROPATHY ASSOCIATED WITH TYPE 2 DIABETES MELLITUS (HCC): ICD-10-CM

## 2022-06-10 RX ORDER — OMEGA-3-ACID ETHYL ESTERS 1 G/1
CAPSULE, LIQUID FILLED ORAL
Qty: 360 CAPSULE | Refills: 3 | Status: SHIPPED | OUTPATIENT
Start: 2022-06-10

## 2022-07-01 RX ORDER — DULAGLUTIDE 4.5 MG/.5ML
INJECTION, SOLUTION SUBCUTANEOUS
Qty: 6 ML | Refills: 1 | Status: SHIPPED | OUTPATIENT
Start: 2022-07-01

## 2022-07-11 ENCOUNTER — OFFICE VISIT (OUTPATIENT)
Dept: INTERNAL MEDICINE CLINIC | Age: 72
End: 2022-07-11
Payer: MEDICARE

## 2022-07-11 VITALS
SYSTOLIC BLOOD PRESSURE: 100 MMHG | WEIGHT: 250.2 LBS | OXYGEN SATURATION: 98 % | HEIGHT: 71 IN | HEART RATE: 69 BPM | DIASTOLIC BLOOD PRESSURE: 60 MMHG | BODY MASS INDEX: 35.03 KG/M2

## 2022-07-11 DIAGNOSIS — E11.42 DIABETIC POLYNEUROPATHY ASSOCIATED WITH TYPE 2 DIABETES MELLITUS (HCC): ICD-10-CM

## 2022-07-11 DIAGNOSIS — I73.9 PVD (PERIPHERAL VASCULAR DISEASE) (HCC): ICD-10-CM

## 2022-07-11 DIAGNOSIS — M1A.9XX0 CHRONIC GOUT WITHOUT TOPHUS, UNSPECIFIED CAUSE, UNSPECIFIED SITE: ICD-10-CM

## 2022-07-11 DIAGNOSIS — I10 ESSENTIAL HYPERTENSION: Primary | ICD-10-CM

## 2022-07-11 DIAGNOSIS — Z12.5 SCREENING PSA (PROSTATE SPECIFIC ANTIGEN): ICD-10-CM

## 2022-07-11 DIAGNOSIS — E29.1 PRIMARY MALE HYPOGONADISM: ICD-10-CM

## 2022-07-11 PROCEDURE — 3051F HG A1C>EQUAL 7.0%<8.0%: CPT | Performed by: INTERNAL MEDICINE

## 2022-07-11 PROCEDURE — 1123F ACP DISCUSS/DSCN MKR DOCD: CPT | Performed by: INTERNAL MEDICINE

## 2022-07-11 PROCEDURE — 99214 OFFICE O/P EST MOD 30 MIN: CPT | Performed by: INTERNAL MEDICINE

## 2022-07-11 RX ORDER — BENAZEPRIL HYDROCHLORIDE 20 MG/1
20 TABLET ORAL DAILY
Qty: 90 TABLET | Refills: 1 | Status: SHIPPED | OUTPATIENT
Start: 2022-07-11

## 2022-07-11 SDOH — ECONOMIC STABILITY: FOOD INSECURITY: WITHIN THE PAST 12 MONTHS, THE FOOD YOU BOUGHT JUST DIDN'T LAST AND YOU DIDN'T HAVE MONEY TO GET MORE.: NEVER TRUE

## 2022-07-11 SDOH — ECONOMIC STABILITY: FOOD INSECURITY: WITHIN THE PAST 12 MONTHS, YOU WORRIED THAT YOUR FOOD WOULD RUN OUT BEFORE YOU GOT MONEY TO BUY MORE.: NEVER TRUE

## 2022-07-11 ASSESSMENT — PATIENT HEALTH QUESTIONNAIRE - PHQ9
SUM OF ALL RESPONSES TO PHQ QUESTIONS 1-9: 0
SUM OF ALL RESPONSES TO PHQ QUESTIONS 1-9: 0
2. FEELING DOWN, DEPRESSED OR HOPELESS: 0
1. LITTLE INTEREST OR PLEASURE IN DOING THINGS: 0
SUM OF ALL RESPONSES TO PHQ9 QUESTIONS 1 & 2: 0
SUM OF ALL RESPONSES TO PHQ QUESTIONS 1-9: 0
SUM OF ALL RESPONSES TO PHQ QUESTIONS 1-9: 0

## 2022-07-11 ASSESSMENT — SOCIAL DETERMINANTS OF HEALTH (SDOH): HOW HARD IS IT FOR YOU TO PAY FOR THE VERY BASICS LIKE FOOD, HOUSING, MEDICAL CARE, AND HEATING?: NOT HARD AT ALL

## 2022-07-11 ASSESSMENT — ENCOUNTER SYMPTOMS
WHEEZING: 0
SHORTNESS OF BREATH: 0

## 2022-07-11 NOTE — PROGRESS NOTES
Living Expenses: Not hard at all   Food Insecurity: No Food Insecurity    Worried About [facility] in the Last Year: Never true    Ran Out of Food in the Last Year: Never true   Transportation Needs:     Lack of Transportation (Medical): Not on file    Lack of Transportation (Non-Medical): Not on file   Physical Activity:     Days of Exercise per Week: Not on file    Minutes of Exercise per Session: Not on file   Stress:     Feeling of Stress : Not on file   Social Connections:     Frequency of Communication with Friends and Family: Not on file    Frequency of Social Gatherings with Friends and Family: Not on file    Attends [employer] Services: Not on file    Active Member of [address] [place] [address] or Organizations: Not on file    Attends Club or Organization Meetings: Not on file    Marital Status: Not on file   Intimate Partner Violence:     Fear of Current or Ex-Partner: Not on file    Emotionally Abused: Not on file    Physically Abused: Not on file    Sexually Abused: Not on file   Housing Stability:     Unable to Pay for Housing in the Last Year: Not on file    Number of [place] in the Last Year: Not on file    Unstable Housing in the Last Year: Not on file     Family History   Problem Relation Age of Onset    Cancer Father         skin    Coronary Art Dis Father     Diabetes Father     Coronary Art Dis Paternal Uncle     Diabetes Paternal Uncle     Stroke Paternal Uncle     Heart Disease Neg Hx     High Blood Pressure Neg Hx     Osteoporosis Neg Hx     Thyroid Disease Neg Hx     Heart Attack Neg Hx        Review of Systems   Constitutional: Negative for diaphoresis and unexpected weight change. Respiratory: Negative for shortness of breath and wheezing. Cardiovascular: Negative for chest pain and palpitations.    Neurological:        Numbness in the stockings area       OBJECTIVE:  Pulse Readings from Last 4 Encounters:   07/11/22 69   04/27/22 97   01/12/22 67   01/06/22 70 Wt Readings from Last 4 Encounters:   07/11/22 250 lb 3.2 oz (113.5 kg)   04/27/22 255 lb (115.7 kg)   03/09/22 259 lb (117.5 kg)   01/12/22 262 lb (118.8 kg)     BP Readings from Last 4 Encounters:   07/11/22 100/60   04/27/22 (!) 85/50   03/09/22 124/80   01/12/22 (!) 103/56     Physical Exam  Vitals and nursing note reviewed. Constitutional:       Appearance: He is not ill-appearing. Eyes:      Conjunctiva/sclera: Conjunctivae normal.   Cardiovascular:      Rate and Rhythm: Normal rate and regular rhythm. Heart sounds: Normal heart sounds. No murmur heard. Pulmonary:      Effort: Pulmonary effort is normal.      Breath sounds: Normal breath sounds. Abdominal:      General: Bowel sounds are normal.   Musculoskeletal:      Right lower leg: No edema. Left lower leg: No edema. Skin:     Comments: Sensory exam of the foot is absent at the dorsal surface but present at the plantar surface, tested with the monofilament. Discoloration of the right great toe nail bed. Can Not recall no definite injury. Skin lesion with silver scaly layer at the back, going to see the dermatologist.   Neurological:      General: No focal deficit present. Mental Status: He is alert.          CBC:   Lab Results   Component Value Date/Time    WBC 9.5 07/14/2021 04:23 PM    HGB 14.3 07/14/2021 04:23 PM    HCT 42.2 07/14/2021 04:23 PM     07/14/2021 04:23 PM     CMP:  Lab Results   Component Value Date/Time     04/26/2022 07:44 AM    K 4.0 04/26/2022 07:44 AM     04/26/2022 07:44 AM    CO2 22 04/26/2022 07:44 AM    ANIONGAP 13 04/26/2022 07:44 AM    GLUCOSE 77 04/26/2022 07:44 AM    BUN 17 04/26/2022 07:44 AM    CREATININE 1.2 04/26/2022 07:44 AM    GFRAA >60 04/26/2022 07:44 AM    GFRAA >60 01/19/2013 08:53 AM    CALCIUM 9.4 04/26/2022 07:44 AM    PROT 6.6 04/26/2022 07:44 AM    PROT 7.5 02/16/2013 08:13 AM    LABALBU 4.4 04/26/2022 07:44 AM    AGRATIO 2.0 04/26/2022 07:44 AM    BILITOT 0.4 04/26/2022 07:44 AM    ALKPHOS 101 04/26/2022 07:44 AM    ALT 21 04/26/2022 07:44 AM    AST 17 04/26/2022 07:44 AM    GLOB 3.1 09/14/2021 08:57 AM     URINALYSIS:  Lab Results   Component Value Date/Time    LABMICR <1.20 04/26/2022 07:58 AM     HBA1C:   Lab Results   Component Value Date/Time    LABA1C 7.9 04/26/2022 07:44 AM    .0 04/26/2022 07:44 AM     MICRO/ALB:   Lab Results   Component Value Date/Time    LABMICR <1.20 04/26/2022 07:58 AM    LABCREA 158.1 04/26/2022 07:58 AM    MALBCR see below 04/26/2022 07:58 AM     LIPID:  Lab Results   Component Value Date/Time    CHOL 101 04/26/2022 07:44 AM    TRIG 176 04/26/2022 07:44 AM    HDL 26 04/26/2022 07:44 AM    HDL 27 03/17/2012 08:21 AM    LDLCALC 40 04/26/2022 07:44 AM    LABVLDL 35 04/26/2022 07:44 AM     TSH:   Lab Results   Component Value Date/Time    TSHREFLEX 0.93 07/01/2020 09:05 AM     PSA:   Lab Results   Component Value Date/Time    PSA 2.48 07/14/2021 04:23 PM        ASSESSMENT/PLAN:  Assessment/Plan:  Love was seen today for 6 month follow-up and diabetes. Diagnoses and all orders for this visit:    Essential hypertension  I see patient have several blood pressure reading with systolic number below 874. #1 orthostatic symptoms at times. Will reduce benazepril to 20 mg  Primary male hypogonadism  We will continue testosterone supplement. -     PSA Screening; Future    Screening PSA (prostate specific antigen)  -     PSA Screening; Future    Diabetic polyneuropathy associated with type 2 diabetes mellitus ([facility]  . He will continue to follow with endocrinologist.  PVD [facility]peripheral vascular disease) [facility]  At this time patient is not symptomatic. Continue to monitor symptoms. Chronic gout without tophus, unspecified cause, unspecified site  -     Uric Acid; Future  On allopurinol 300 mg/day.           Orders Placed This Encounter   Procedures    PSA Screening     Standing Status:   Future     Standing Expiration Date:   7/11/2023   Rohit Uric Acid     Standing Status:   Future     Standing Expiration Date:   7/11/2023     Current Outpatient Medications   Medication Sig Dispense Refill    benazepril (LOTENSIN) 20 MG tablet Take 1 tablet by mouth daily 90 tablet 1    TRULICITY 4.5 ELVA/0.1AX SOPN INJECT 4.5 MG INTO THE SKIN ONCE A WEEK AS DIRECTED (PRESCRIBER OK IS NEEDED FOR NEXT FILL) 6 mL 1    omega-3 acid ethyl esters (LOVAZA) 1 g capsule 2 CAPSULES 2 TIMES A DAY **MAY REFILL** 360 capsule 3    insulin glargine (LANTUS SOLOSTAR) 100 UNIT/ML injection pen INJECT 50 UNITS SUBCUTANEOUSLY 2 TIMES A DAY (PRESCRIBER OK IS NEEDED FOR NEXT FILL) 50 pen 1    insulin lispro (HUMALOG KWIKPEN) 200 UNIT/ML SOPN pen INJECT 25 UNITS BEFORE MEALS 3 times a day PLUS CORRECTION 2 UNITS FOR EVERY 50 OF BLOOD GLUCOSE ABOVE 150 qac,total daily dose 110 units 60 pen 1    Continuous Blood Gluc Sensor (FREESTYLE ALBA 14 DAY SENSOR) MISC USE 1 SENSOR EVERY 14 DAYS (PRESCRIBER OK IS NEEDED FOR NEXT FILL) 6 each 1    JARDIANCE 25 MG tablet 1 TABLET BY MOUTH ONCE DAILY (PRESCRIBER OK IS NEEDED FOR NEXT FILL) 30 tablet 11    levothyroxine (SYNTHROID) 100 MCG tablet 1 TABLET BY MOUTH ONCE DAILY (PRESCRIBER OK IS NEEDED FOR NEXT FILL) 90 tablet 0    amLODIPine (NORVASC) 10 MG tablet 1 TABLET BY MOUTH ONCE DAILY (PRESCRIBER OK NEEDED FOR NEXT REFILL) 90 tablet 3    allopurinol (ZYLOPRIM) 300 MG tablet TAKE 1 TABLET ONCE DAILY **MAY REFILL** 90 tablet 3    potassium citrate (UROCIT-K) 10 MEQ (1080 MG) extended release tablet 1 TABLET ONCE DAILY (PRESCRIBER OK NEEDED FOR NEXT REFILL) 90 tablet 3    vitamin D (ERGOCALCIFEROL) 1.25 MG (10626 UT) CAPS capsule 1 CAPSULE EVERY WEEK 12 capsule 3    atorvastatin (LIPITOR) 20 MG tablet Take 1 tablet by mouth daily 90 tablet 3    Insulin Pen Needle (B-D UF III MINI PEN NEEDLES) 31G X 5 MM MISC USE 6 TIMES PER DAY OR AS DIRECTED FOR LANTUS - HUMALOG **MAY REFILL** 100 each 11    Continuous Blood Gluc  (FREESTYLE ALBA READER) DELMIS 1 Units by Does not apply route as needed (as needed) 1 Device 0    Turmeric 500 MG CAPS Take 1,000 mg by mouth daily      UNABLE TO FIND CPAP supplies, water reservoir and tubing. Dx 327.23 1 each 0    aspirin EC 81 MG EC tablet Take 1 tablet by mouth daily. 30 tablet 11    Multiple Vitamin (MULTIVITAMIN PO) Take  by mouth daily.  Testosterone (ANDROGEL) 20.25 MG/ACT (1.62%) GEL gel APPLY 2 PUMPS TO SKIN ONCE DAILY AS DIRECTED **MAY REFILL** 75 g 3    ciclopirox (LOPROX) 0.77 % cream Apply to the left foot twice daily for 4 weeks. (Patient not taking: Reported on 4/27/2022) 30 g 2     No current facility-administered medications for this visit. No follow-ups on file.

## 2022-07-18 DIAGNOSIS — E03.9 ACQUIRED HYPOTHYROIDISM: ICD-10-CM

## 2022-07-18 RX ORDER — LEVOTHYROXINE SODIUM 0.1 MG/1
TABLET ORAL
Qty: 90 TABLET | Refills: 0 | Status: SHIPPED | OUTPATIENT
Start: 2022-07-18 | End: 2022-10-13

## 2022-07-18 NOTE — TELEPHONE ENCOUNTER
Medication:   Requested Prescriptions     Pending Prescriptions Disp Refills    levothyroxine (SYNTHROID) 100 MCG tablet [Pharmacy Med Name: LEVOTHYROXINE 100MCG TAB JUANY 100 Tablet] 90 tablet 0     Si TABLET BY MOUTH ONCE DAILY **NO REFILLS-PRESCRIBER OK NEEDED FOR NEXT REFILL**       Last Filled:      Patient Phone Number: [phone] (home)     Last appt: 2022   Next appt: 8/3/2022    Last Thyroid:   Lab Results   Component Value Date/Time    TSH 0.38 2022 07:44 AM    FT3 2.7 2022 07:44 AM    [id number] 1.02 2010 08:04 AM    F4STXXI 1.02 2010 08:04 AM    T4FREE 1.6 2022 07:44 AM    O3WTJWQ 11.8 2010 08:04 AM

## 2022-08-02 DIAGNOSIS — E03.9 ACQUIRED HYPOTHYROIDISM: ICD-10-CM

## 2022-08-02 DIAGNOSIS — R79.89 ELEVATED SERUM CREATININE: ICD-10-CM

## 2022-08-02 DIAGNOSIS — Z12.5 SCREENING PSA (PROSTATE SPECIFIC ANTIGEN): ICD-10-CM

## 2022-08-02 DIAGNOSIS — E55.9 VITAMIN D DEFICIENCY: ICD-10-CM

## 2022-08-02 DIAGNOSIS — E78.2 MIXED HYPERLIPIDEMIA: ICD-10-CM

## 2022-08-02 DIAGNOSIS — E29.1 PRIMARY MALE HYPOGONADISM: ICD-10-CM

## 2022-08-02 DIAGNOSIS — M1A.9XX0 CHRONIC GOUT WITHOUT TOPHUS, UNSPECIFIED CAUSE, UNSPECIFIED SITE: ICD-10-CM

## 2022-08-02 LAB
A/G RATIO: 1.9 (ref 1.1–2.2)
ALBUMIN SERPL-MCNC: 4.7 G/DL (ref 3.4–5)
ALP BLD-CCNC: 107 U/L (ref 40–129)
ALT SERPL-CCNC: 27 U/L (ref 10–40)
ANION GAP SERPL CALCULATED.3IONS-SCNC: 12 MMOL/L (ref 3–16)
AST SERPL-CCNC: 23 U/L (ref 15–37)
BILIRUB SERPL-MCNC: 0.5 MG/DL (ref 0–1)
BUN BLDV-MCNC: 25 MG/DL (ref 7–20)
CALCIUM SERPL-MCNC: 10 MG/DL (ref 8.3–10.6)
CHLORIDE BLD-SCNC: 101 MMOL/L (ref 99–110)
CO2: 28 MMOL/L (ref 21–32)
CREAT SERPL-MCNC: 1.3 MG/DL (ref 0.8–1.3)
GFR AFRICAN AMERICAN: >60
GFR NON-AFRICAN AMERICAN: 54
GLUCOSE BLD-MCNC: 98 MG/DL (ref 70–99)
POTASSIUM SERPL-SCNC: 4.4 MMOL/L (ref 3.5–5.1)
PROSTATE SPECIFIC ANTIGEN: 3.77 NG/ML (ref 0–4)
SODIUM BLD-SCNC: 141 MMOL/L (ref 136–145)
T3 FREE: 2.6 PG/ML (ref 2.3–4.2)
T4 FREE: 1.4 NG/DL (ref 0.9–1.8)
TOTAL PROTEIN: 7.2 G/DL (ref 6.4–8.2)
TSH SERPL DL<=0.05 MIU/L-ACNC: 0.85 UIU/ML (ref 0.27–4.2)
URIC ACID, SERUM: 5 MG/DL (ref 3.5–7.2)
VITAMIN D 25-HYDROXY: 69 NG/ML

## 2022-08-03 ENCOUNTER — TELEPHONE (OUTPATIENT)
Dept: INTERNAL MEDICINE CLINIC | Age: 72
End: 2022-08-03

## 2022-08-03 ENCOUNTER — OFFICE VISIT (OUTPATIENT)
Dept: ENDOCRINOLOGY | Age: 72
End: 2022-08-03
Payer: MEDICARE

## 2022-08-03 VITALS
HEIGHT: 71 IN | DIASTOLIC BLOOD PRESSURE: 55 MMHG | HEART RATE: 74 BPM | RESPIRATION RATE: 14 BRPM | OXYGEN SATURATION: 97 % | TEMPERATURE: 98 F | SYSTOLIC BLOOD PRESSURE: 100 MMHG | BODY MASS INDEX: 35.28 KG/M2 | WEIGHT: 252 LBS

## 2022-08-03 DIAGNOSIS — E03.9 ACQUIRED HYPOTHYROIDISM: ICD-10-CM

## 2022-08-03 DIAGNOSIS — R79.89 ELEVATED SERUM CREATININE: ICD-10-CM

## 2022-08-03 DIAGNOSIS — E55.9 VITAMIN D DEFICIENCY: ICD-10-CM

## 2022-08-03 DIAGNOSIS — I10 ESSENTIAL HYPERTENSION: ICD-10-CM

## 2022-08-03 DIAGNOSIS — E66.9 CLASS 1 OBESITY WITH SERIOUS COMORBIDITY AND BODY MASS INDEX (BMI) OF 34.0 TO 34.9 IN ADULT, UNSPECIFIED OBESITY TYPE: ICD-10-CM

## 2022-08-03 DIAGNOSIS — E78.2 MIXED HYPERLIPIDEMIA: ICD-10-CM

## 2022-08-03 PROBLEM — E66.811 CLASS 1 OBESITY WITH BODY MASS INDEX (BMI) OF 34.0 TO 34.9 IN ADULT: Status: ACTIVE | Noted: 2022-08-03

## 2022-08-03 LAB
ESTIMATED AVERAGE GLUCOSE: 171.4 MG/DL
HBA1C MFR BLD: 7.6 %

## 2022-08-03 PROCEDURE — 1123F ACP DISCUSS/DSCN MKR DOCD: CPT | Performed by: INTERNAL MEDICINE

## 2022-08-03 PROCEDURE — 99214 OFFICE O/P EST MOD 30 MIN: CPT | Performed by: INTERNAL MEDICINE

## 2022-08-03 PROCEDURE — 3051F HG A1C>EQUAL 7.0%<8.0%: CPT | Performed by: INTERNAL MEDICINE

## 2022-08-03 RX ORDER — ACETAMINOPHEN 160 MG
TABLET,DISINTEGRATING ORAL DAILY
COMMUNITY

## 2022-08-03 NOTE — RESULT ENCOUNTER NOTE
PSA within normal limit. Uric acid level acceptable.   I believe other labs will be addressed by endocrinologist.

## 2022-08-03 NOTE — PROGRESS NOTES
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 Current symptoms/problems include  hyperglycemia  and show no change. 1.  Type 2 diabetes, uncontrolled, with neuropathy (HCC) [E11.40, E11.65]    Diagnosed with Type 2 diabetes mellitus in 1994. Comorbid conditions:  hyperlipidemia, HTN and Neuropathy    Current diabetic medications include: Trulicity, Humalog 25 units 3 times daily, Lantus 50 mg twice daily, Jardiance 25 mg daily    Intolerance to diabetes medications: Metformin upset GI     Weight trend: stable  Prior visit with dietician: yes  Current diet: on average, 3 meals per day  Current exercise: Golf twice weekly, with walks daily, swimming twice a week     Current monitoring regimen: home blood tests - 4 times daily  Has brought blood glucose log/meter:  Yes  Home blood sugar records: fasting range: 105 - 130 and postprandial range:  130-140  Any episodes of hypoglycemia? None   Hypoglycemia frequency and time(s):  1-2 episodes per year   Does patient have Glucagon emergency kit? No  Does patient have rapid acting carbohydrate? Yes  Does patient wear a medic alert bracelet or necklace? No    2. Acquired hypothyroidism  Has fatigue. 3. Essential hypertension  No headaches. 4. Mixed hyperlipidemia  No muscle pain. 5. Vitamin D deficiency  Has fatigue. 6. Obesity  On diet, exercise    7. Elevated creatinine  No urination problems      THYROID ULTRASOUND-       INDICATION- Type 2 diabetes, history of hypothyroidism. Patient is   status post left thyroid lobectomy. FINDINGS-        The right thyroid lobe appears normal, measuring 4.9 x 1.4 x 1.6   cm. Thyroid parenchyma is mildly heterogeneous but no focal lesion   is detected. The left thyroid lobe is not visualized. IMPRESSION-        1. Mild heterogeneity of the right thyroid lobe with no focal   lesion detected. 2. Status post left thyroid lobectomy.             Read ByDiane Blackburn CHAPO        Released By- Lilibeth Cartagena        Released Date Time- 02/08/10 1137            [facility]         Lab Results   Component Value Date    LABA1C 7.6 08/02/2022     Lab Results   Component Value Date    .4 08/02/2022           Past Medical History:   Diagnosis Date    Hearing loss     Hypogonadism in male     Hypothyroidism     Kidney stones     MAO (obstructive sleep apnea)     Type II or unspecified type diabetes mellitus without mention of complication, not stated as uncontrolled       Patient Active Problem List   Diagnosis    Diabetic polyneuropathy ([facility]    Acquired hypothyroidism    Mixed hyperlipidemia    Primary male hypogonadism    Vitamin D deficiency    Type 2 diabetes, uncontrolled, with neuropathy ([facility]    Essential hypertension    Obstructive sleep apnea syndrome    Class 2 severe obesity with serious comorbidity and body mass index (BMI) of 36.0 to 36.9 in adult (HCC)    Elevated serum creatinine    Class 1 obesity with body mass index (BMI) of 34.0 to 34.9 in adult     Past Surgical History:   Procedure Laterality Date    CATARACT REMOVAL WITH IMPLANT Bilateral     CHOLECYSTECTOMY      9/11    COLONOSCOPY      2009    COLONOSCOPY  01/14/2021    ,Normal colonoscopy,Diverticulosis. repeat 5-7 years. THYROIDECTOMY      lt lobectomy 2/2010    TONSILLECTOMY       Social History     Socioeconomic History    Marital status:       Spouse name: Not on file    Number of children: Not on file    Years of education: Not on file    Highest education level: Not on file   Occupational History    Occupation: retired pediatrician   Tobacco Use    Smoking status: Never    Smokeless tobacco: Never   Vaping Use    Vaping Use: Never used   Substance and Sexual Activity    Alcohol use: Not Currently     Alcohol/week: 1.0 standard drink     Types: 1 Cans of beer per week     Comment: 4 of 7 days     Drug use: No    Sexual activity: Never   Other Topics Concern    Not on file   Social History Narrative    Not on file     Social Determinants of Health     Financial Resource Strain: Low Risk     Difficulty of Paying Living Expenses: Not hard at all   Food Insecurity: No Food Insecurity    Worried About Running Out of Food in the Last Year: Never true    Ran Out of Food in the Last Year: Never true   Transportation Needs: Not on file   Physical Activity: Not on file   Stress: Not on file   Social Connections: Not on file   Intimate Partner Violence: Not on file   Housing Stability: Not on file     Family History   Problem Relation Age of Onset    Cancer Father         skin    Coronary Art Dis Father     Diabetes Father     Coronary Art Dis Paternal Uncle     Diabetes Paternal Uncle     Stroke Paternal Uncle     Heart Disease Neg Hx     High Blood Pressure Neg Hx     Osteoporosis Neg Hx     Thyroid Disease Neg Hx     Heart Attack Neg Hx        Allergies   Allergen Reactions    Zosyn [Piperacillin Sod-Tazobactam So]     Demerol      vomiting    Metformin Other (See Comments)     Intolerent    Metformin And Related      intolerant     Family Status   Relation Name Status    Father      Mother  Alive    PUnc  (Not Specified)    Neg Hx  (Not Specified)       Review of Systems  Constitutional: has minimal, no fever, no recent weight gain, no recent weight loss, no changes in appetite  Eyes: no eye pain, bilateral cataracts removal   Ears, nose, throat: no nasal congestion, no sore throat, no earache, no decrease in hearing, no hoarseness, no dry mouth, no sinus problems, no difficulty swallowing, no neck lumps, no dental problems, no mouth sores, no ringing in ears  Pulmonary: no shortness of breath, no wheezing, no dyspnea on exertion, no cough  Cardiovascular: no chest pain, no lower extremity edema, no orthopnea, no intermittent leg claudication, no palpitations  Gastrointestinal: no abdominal pain, no nausea, no vomiting, no diarrhea, no constipation, no dysphagia, no heartburn, no bloating  Genitourinary: no dysuria, no urinary incontinence, no urinary hesitancy, no urinary frequency, no feelings of urinary urgency, no nocturia  Musculoskeletal: no joint swelling, no joint stiffness, no joint pain, no muscle cramps, no muscle pain, no bone pain, no fractures  Integument/Breast:  no skin rashes, no skin lesions, no itching, no dry skin  Neurological: Has numbness, has tingling, no weakness, no confusion, no headaches, no dizziness, no fainting, no tremors, no decrease in memory, no balance problems  Psychiatric: no anxiety, no depression, no insomnia  Hematologic/Lymphatic: no tendency for easy bleeding, no swollen lymph nodes, no tendency for easy bruising  Immunology: has seasonal allergies, no frequent infections, no frequent illnesses  Endocrine: no temperature intolerance     OBJECTIVE:  BP (!) 100/55   Pulse 74   Temp 98 °F (36.7 °C)   Resp 14   Ht 5' 11\" (1.803 m)   Wt 252 lb (114.3 kg)   SpO2 97%   BMI 35.15 kg/m²      Wt Readings from Last 3 Encounters:   08/03/22 252 lb (114.3 kg)   07/11/22 250 lb 3.2 oz (113.5 kg)   04/27/22 255 lb (115.7 kg)       Constitutional: no acute distress, well appearing and well nourished  Psychiatric: oriented to person, place and time, judgement and insight and normal, recent and remote memory and intact and mood and affect are normal  Skin: skin and subcutaneous tissue is normal without mass, normal turgor  Head and Face: examination of head and face revealed no abnormalities  Eyes: no lid or conjunctival swelling, erythema or discharge, pupils are normal, equal, round, reactive to light  Ears/Nose: external inspection of ears and nose revealed no abnormalities, hearing is grossly normal  Oropharynx/Mouth/Face: lips, tongue and gums are normal with no lesions, the voice quality was normal  Neck: neck is supple and symmetric, with midline trachea and no masses, thyroid left lobe absent, right lobe normal  Lymphatics: normal cervical lymph nodes, normal supraclavicular nodes  Pulmonary: no increased work of breathing or signs of respiratory distress, lungs are clear to auscultation  Cardiovascular: normal heart rate and rhythm, normal S1 and S2, no murmurs and pedal pulses and 2+ bilaterally, No edema  Abdomen: abdomen is soft, non-tender with no masses  Musculoskeletal: normal gait and station and exam of the digits and nails are normal  Neurological: normal coordination and normal general cortical function    Visual inspection:   Deformity/amputation: absent  Skin lesions/pre-ulcerative calluses: absent  Edema: right- negative, left- negative     Sensory exam:   Monofilament sensation: abnormal  (minimum of 5 random plantar locations tested, avoiding callused areas - > 1 area with absence of sensation is + for neuropathy)  High risk feet     Plus at least one of the following:  Pulses: decreased  Proprioception: Impaired  Vibration (128 Hz): Absent    ASSESSMENT/PLAN:    1. Type 2 diabetes, uncontrolled, with neuropathy (HCC)  Discussed insulin pumps, will consider within 1 year  Hemoglobin A1c 10.1-7.8-8.5-8.2-7.6-8.7-8.3-8.6-7.9-7.6  Patient states he is eating better. On low carb diet. Exercises. - continue medications   - Lantus  50 units bid  - Humalog 25 units qac plus correction 2 units for every 50 of BG above 150 qac  - Increase Trulicity to 4.5 mg weekly  - Jardiance 25 mg daily    2. Acquired hypothyroidism  - Levothyroxine 0.1 mg   - TSH 0.68-1.15-0.55-1.91-1.10-0.38-0.85    3. Essential hypertension  Same medications  Hypertension well controlled     4. Mixed hyperlipidemia  HDL 27-27-28-26  LDL [id number]  [phone]  - Atorvastatin   - LDL well controlled     5. Vitamin D deficiency  25OH vit D 45-52-65.8-64.1-56.6-50.5-49.3-57.4-69.0  Weekly Supplement  - Vitamin D levels within normal limits     6. Obesity  Diet, exercise    7.   Elevated creatinine  Follow-up  Creatinine 1. 5 -1.1-1.2-1.1-1.2-1.1-1.2-1.3  GFR>60-59->60-59-more than 61  Follow with family doctor    Reviewed and/or ordered clinical lab results Yes  Reviewed and/or ordered radiology tests Yes  Reviewed and/or ordered other diagnostic tests No  Discussed test results with performing physician No  Independently reviewed image, tracing, or specimen No  Made a decision to obtain old records No  Reviewed old records Yes  Obtained history from other than patient Yasmine Maxwell MD was counseled regarding symptoms of diabetes diagnosis, course and complications of disease if inadequately treated, side effects of medications, diagnosis, treatment options, and prognosis, risks, benefits, complications, and alternatives of treatment, labs, imaging and other studies and treatment targets and goals. He understands instructions and counseling. Return in about 3 months (around 11/3/2022) for diabetes.

## 2022-08-11 ENCOUNTER — TELEPHONE (OUTPATIENT)
Dept: ADMINISTRATIVE | Age: 72
End: 2022-08-11

## 2022-08-11 NOTE — TELEPHONE ENCOUNTER
PA COVER MY MEDS  Medication:Testosterone 20.25 MG/ACT(1.62%) gel    Key:[id number] - PA Case ID: [id number]  Status:PENDING       PA Case: [id number], Status: Approved, Coverage Starts on: 5/12/2022 12:00:00 AM, Coverage Ends on: 8/11/2023 12:00:00 AM.

## 2022-08-22 ENCOUNTER — TELEPHONE (OUTPATIENT)
Dept: INTERNAL MEDICINE CLINIC | Age: 72
End: 2022-08-22

## 2022-08-22 DIAGNOSIS — U07.1 COVID-19: Primary | ICD-10-CM

## 2022-08-22 NOTE — TELEPHONE ENCOUNTER
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 He said his symptoms started 8/19 in the afternoon and include lethargy, sore throat, headache, cough and phlegm. Patient is requesting prescription for Paxlovid and he uses [facility].

## 2022-10-03 RX ORDER — INSULIN GLARGINE 100 [IU]/ML
INJECTION, SOLUTION SUBCUTANEOUS
Qty: 30 ML | Refills: 0 | Status: SHIPPED | OUTPATIENT
Start: 2022-10-03 | End: 2022-11-02

## 2022-10-13 DIAGNOSIS — E29.1 TESTOSTERONE DEFICIENCY IN MALE: ICD-10-CM

## 2022-10-13 DIAGNOSIS — E03.9 ACQUIRED HYPOTHYROIDISM: ICD-10-CM

## 2022-10-13 RX ORDER — PEN NEEDLE, DIABETIC 31 GX5/16"
NEEDLE, DISPOSABLE MISCELLANEOUS
Qty: 100 EACH | Refills: 11 | Status: SHIPPED | OUTPATIENT
Start: 2022-10-13

## 2022-10-13 RX ORDER — TESTOSTERONE 16.2 MG/G
GEL TRANSDERMAL
Qty: 75 G | Refills: 0 | Status: SHIPPED | OUTPATIENT
Start: 2022-10-13 | End: 2022-11-16

## 2022-10-13 RX ORDER — LEVOTHYROXINE SODIUM 0.1 MG/1
TABLET ORAL
Qty: 90 TABLET | Refills: 0 | Status: SHIPPED | OUTPATIENT
Start: 2022-10-13

## 2022-10-13 RX ORDER — INSULIN LISPRO 200 [IU]/ML
INJECTION, SOLUTION SUBCUTANEOUS
Qty: 12 ML | Refills: 1 | Status: SHIPPED | OUTPATIENT
Start: 2022-10-13

## 2022-10-24 ENCOUNTER — TELEPHONE (OUTPATIENT)
Dept: PHARMACY | Facility: CLINIC | Age: 72
End: 2022-10-24

## 2022-11-02 RX ORDER — INSULIN GLARGINE 100 [IU]/ML
INJECTION, SOLUTION SUBCUTANEOUS
Qty: 30 ML | Refills: 0 | Status: SHIPPED | OUTPATIENT
Start: 2022-11-02 | End: 2022-12-01

## 2022-11-02 RX ORDER — FLASH GLUCOSE SENSOR
KIT MISCELLANEOUS
Qty: 2 EACH | Refills: 1 | Status: SHIPPED | OUTPATIENT
Start: 2022-11-02 | End: 2023-01-03

## 2022-11-15 DIAGNOSIS — E78.2 MIXED HYPERLIPIDEMIA: ICD-10-CM

## 2022-11-15 DIAGNOSIS — R79.89 ELEVATED SERUM CREATININE: ICD-10-CM

## 2022-11-15 DIAGNOSIS — E55.9 VITAMIN D DEFICIENCY: ICD-10-CM

## 2022-11-15 DIAGNOSIS — E03.9 ACQUIRED HYPOTHYROIDISM: ICD-10-CM

## 2022-11-15 LAB
A/G RATIO: 1.8 (ref 1.1–2.2)
ALBUMIN SERPL-MCNC: 4.7 G/DL (ref 3.4–5)
ALP BLD-CCNC: 116 U/L (ref 40–129)
ALT SERPL-CCNC: 28 U/L (ref 10–40)
ANION GAP SERPL CALCULATED.3IONS-SCNC: 18 MMOL/L (ref 3–16)
AST SERPL-CCNC: 24 U/L (ref 15–37)
BILIRUB SERPL-MCNC: 0.5 MG/DL (ref 0–1)
BUN BLDV-MCNC: 22 MG/DL (ref 7–20)
CALCIUM SERPL-MCNC: 10 MG/DL (ref 8.3–10.6)
CHLORIDE BLD-SCNC: 106 MMOL/L (ref 99–110)
CHOLESTEROL, TOTAL: 126 MG/DL (ref 0–199)
CO2: 23 MMOL/L (ref 21–32)
CREAT SERPL-MCNC: 1.1 MG/DL (ref 0.8–1.3)
CREATININE URINE: 95.9 MG/DL (ref 39–259)
GFR SERPL CREATININE-BSD FRML MDRD: >60 ML/MIN/{1.73_M2}
GLUCOSE BLD-MCNC: 119 MG/DL (ref 70–99)
HDLC SERPL-MCNC: 28 MG/DL (ref 40–60)
LDL CHOLESTEROL CALCULATED: 57 MG/DL
MICROALBUMIN UR-MCNC: <1.2 MG/DL
MICROALBUMIN/CREAT UR-RTO: NORMAL MG/G (ref 0–30)
POTASSIUM SERPL-SCNC: 4.7 MMOL/L (ref 3.5–5.1)
SODIUM BLD-SCNC: 147 MMOL/L (ref 136–145)
T3 FREE: 2.6 PG/ML (ref 2.3–4.2)
T4 FREE: 1.3 NG/DL (ref 0.9–1.8)
TOTAL PROTEIN: 7.3 G/DL (ref 6.4–8.2)
TRIGL SERPL-MCNC: 207 MG/DL (ref 0–150)
TSH SERPL DL<=0.05 MIU/L-ACNC: 0.79 UIU/ML (ref 0.27–4.2)
VITAMIN D 25-HYDROXY: 62.5 NG/ML
VLDLC SERPL CALC-MCNC: 41 MG/DL

## 2022-11-16 ENCOUNTER — OFFICE VISIT (OUTPATIENT)
Dept: ENDOCRINOLOGY | Age: 72
End: 2022-11-16
Payer: MEDICARE

## 2022-11-16 VITALS
WEIGHT: 251 LBS | SYSTOLIC BLOOD PRESSURE: 140 MMHG | HEIGHT: 71 IN | DIASTOLIC BLOOD PRESSURE: 62 MMHG | BODY MASS INDEX: 35.14 KG/M2 | RESPIRATION RATE: 14 BRPM | TEMPERATURE: 98 F | HEART RATE: 68 BPM | OXYGEN SATURATION: 98 %

## 2022-11-16 DIAGNOSIS — E66.01 CLASS 2 SEVERE OBESITY WITH SERIOUS COMORBIDITY AND BODY MASS INDEX (BMI) OF 35.0 TO 35.9 IN ADULT, UNSPECIFIED OBESITY TYPE (HCC): ICD-10-CM

## 2022-11-16 DIAGNOSIS — E11.65 POORLY CONTROLLED TYPE 2 DIABETES MELLITUS WITH NEUROPATHY (HCC): Primary | ICD-10-CM

## 2022-11-16 DIAGNOSIS — E78.2 MIXED HYPERLIPIDEMIA: ICD-10-CM

## 2022-11-16 DIAGNOSIS — I10 ESSENTIAL HYPERTENSION: ICD-10-CM

## 2022-11-16 DIAGNOSIS — E11.40 POORLY CONTROLLED TYPE 2 DIABETES MELLITUS WITH NEUROPATHY (HCC): Primary | ICD-10-CM

## 2022-11-16 DIAGNOSIS — R79.89 ELEVATED SERUM CREATININE: ICD-10-CM

## 2022-11-16 DIAGNOSIS — E55.9 VITAMIN D DEFICIENCY: ICD-10-CM

## 2022-11-16 DIAGNOSIS — E03.9 ACQUIRED HYPOTHYROIDISM: ICD-10-CM

## 2022-11-16 PROBLEM — E66.812 CLASS 2 SEVERE OBESITY WITH SERIOUS COMORBIDITY AND BODY MASS INDEX (BMI) OF 35.0 TO 35.9 IN ADULT: Status: ACTIVE | Noted: 2022-11-16

## 2022-11-16 LAB
ESTIMATED AVERAGE GLUCOSE: 185.8 MG/DL
HBA1C MFR BLD: 8.1 %

## 2022-11-16 PROCEDURE — 3078F DIAST BP <80 MM HG: CPT | Performed by: INTERNAL MEDICINE

## 2022-11-16 PROCEDURE — 95251 CONT GLUC MNTR ANALYSIS I&R: CPT | Performed by: INTERNAL MEDICINE

## 2022-11-16 PROCEDURE — 3052F HG A1C>EQUAL 8.0%<EQUAL 9.0%: CPT | Performed by: INTERNAL MEDICINE

## 2022-11-16 PROCEDURE — 1123F ACP DISCUSS/DSCN MKR DOCD: CPT | Performed by: INTERNAL MEDICINE

## 2022-11-16 PROCEDURE — 99214 OFFICE O/P EST MOD 30 MIN: CPT | Performed by: INTERNAL MEDICINE

## 2022-11-16 PROCEDURE — 3074F SYST BP LT 130 MM HG: CPT | Performed by: INTERNAL MEDICINE

## 2022-11-16 NOTE — PROGRESS NOTES
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 Current symptoms/problems include  hyperglycemia  and show no change. 1.  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 Comorbid conditions:  hyperlipidemia, HTN and Neuropathy    Current diabetic medications include: Trulicity, Humalog 25 units 3 times daily, Lantus 50 mg twice daily, Jardiance 25 mg daily    Intolerance to diabetes medications: Metformin upset GI     Weight trend: stable  Prior visit with dietician: yes  Current diet: on average, 3 meals per day  Current exercise: Golf twice weekly, with walks daily, swimming twice a week     Current monitoring regimen: home blood tests - 4 times daily  Has brought blood glucose log/meter:  Yes  Home blood sugar records: fasting range: 105 - 130 and postprandial range:  130-140  Any episodes of hypoglycemia? None   Hypoglycemia frequency and time(s):  1-2 episodes per year   Does patient have Glucagon emergency kit? No  Does patient have rapid acting carbohydrate? Yes  Does patient wear a medic alert bracelet or necklace? No    2. Acquired hypothyroidism  Has fatigue. 3. Essential hypertension  No headaches. 4. Mixed hyperlipidemia  No muscle pain. 5. Vitamin D deficiency  Has fatigue. 6. Obesity  On diet, exercise    7. Elevated creatinine  No urination problems      THYROID ULTRASOUND-       INDICATION- Type 2 diabetes, history of hypothyroidism. Patient is   status post left thyroid lobectomy. FINDINGS-        The right thyroid lobe appears normal, measuring 4.9 x 1.4 x 1.6   cm. Thyroid parenchyma is mildly heterogeneous but no focal lesion   is detected. The left thyroid lobe is not visualized. IMPRESSION-        1. Mild heterogeneity of the right thyroid lobe with no focal   lesion detected. 2. Status post left thyroid lobectomy.             Read By- TY COWAN        Released By- Hanna Hernandez        Released Date Time- 02/08/10 Abraham [id number]         Lab Results   Component Value Date    LABA1C 8.1 11/15/2022     Lab Results   Component Value Date    .8 11/15/2022           Past Medical History:   Diagnosis Date    Hearing loss     Hypogonadism in male     Hypothyroidism     Kidney stones     MAO (obstructive sleep apnea)     Type II or unspecified type diabetes mellitus without mention of complication, not stated as uncontrolled       Patient Active Problem List   Diagnosis    Diabetic polyneuropathy ([facility] [address]    Acquired hypothyroidism    Mixed hyperlipidemia    Primary male hypogonadism    Vitamin D deficiency    Type 2 diabetes, uncontrolled, with neuropathy    Essential hypertension    Obstructive sleep apnea syndrome    Class 2 severe obesity with serious comorbidity and body mass index (BMI) of 36.0 to 36.9 in adult (HCC)    Elevated serum creatinine    Class 1 obesity with body mass index (BMI) of 34.0 to 34.9 in adult    Poorly controlled type 2 diabetes mellitus with neuropathy (HCC)    Class 2 severe obesity with serious comorbidity and body mass index (BMI) of 35.0 to 35.9 in adult [facility])     Past Surgical History:   Procedure Laterality Date    CATARACT REMOVAL WITH IMPLANT Bilateral     CHOLECYSTECTOMY      9/11    COLONOSCOPY      2009    COLONOSCOPY  01/14/2021    ,Normal colonoscopy,Diverticulosis. repeat 5-7 years. THYROIDECTOMY      lt lobectomy 2/2010    TONSILLECTOMY      TOOTH EXTRACTION       Social History     Socioeconomic History    Marital status:       Spouse name: Not on file    Number of children: Not on file    Years of education: Not on file    Highest education level: Not on file   Occupational History    Occupation: retired pediatrician   Tobacco Use    Smoking status: Never    Smokeless tobacco: Never   Vaping Use    Vaping Use: Never used   Substance and Sexual Activity    Alcohol use: Not Currently     Alcohol/week: 1.0 standard drink     Types: 1 Cans of beer per week     Comment: 4 of 7 days     Drug use: No    Sexual activity: Never   Other Topics Concern    Not on file   Social History Narrative    Not on file     Social Determinants of Health     Financial Resource Strain: Low Risk     Difficulty of Paying Living Expenses: Not hard at all   Food Insecurity: No Food Insecurity    Worried About Running Out of Food in the Last Year: Never true    Ran Out of Food in the Last Year: Never true   Transportation Needs: Not on file   Physical Activity: Not on file   Stress: Not on file   Social Connections: Not on file   Intimate Partner Violence: Not on file   Housing Stability: Not on file     Family History   Problem Relation Age of Onset    Cancer Father         skin    Coronary Art Dis Father     Diabetes Father     Coronary Art Dis Paternal Uncle     Diabetes Paternal Uncle     Stroke Paternal Uncle     Heart Disease Neg Hx     High Blood Pressure Neg Hx     Osteoporosis Neg Hx     Thyroid Disease Neg Hx     Heart Attack Neg Hx        Allergies   Allergen Reactions    Zosyn [Piperacillin Sod-Tazobactam So]     Demerol      vomiting    Metformin Other (See Comments)     Intolerent    Metformin And Related      intolerant     Family Status   Relation Name Status    Father      Mother  Alive    PUnc  (Not Specified)    Neg Hx  (Not Specified)       Review of Systems  Constitutional: has minimal, no fever, no recent weight gain, no recent weight loss, no changes in appetite  Eyes: no eye pain, bilateral cataracts removal   Ears, nose, throat: no nasal congestion, no sore throat, no earache, no decrease in hearing, no hoarseness, no dry mouth, no sinus problems, no difficulty swallowing, no neck lumps, no dental problems, no mouth sores, no ringing in ears  Pulmonary: no shortness of breath, no wheezing, no dyspnea on exertion, no cough  Cardiovascular: no chest pain, no lower extremity edema, no orthopnea, no intermittent leg claudication, no palpitations  Gastrointestinal: no abdominal pain, no nausea, no vomiting, no diarrhea, no constipation, no dysphagia, no heartburn, no bloating  Genitourinary: no dysuria, no urinary incontinence, no urinary hesitancy, no urinary frequency, no feelings of urinary urgency, no nocturia  Musculoskeletal: no joint swelling, no joint stiffness, no joint pain, no muscle cramps, no muscle pain, no bone pain, no fractures  Integument/Breast:  no skin rashes, no skin lesions, no itching, no dry skin  Neurological: Has numbness, has tingling, no weakness, no confusion, no headaches, no dizziness, no fainting, no tremors, no decrease in memory, no balance problems  Psychiatric: no anxiety, no depression, no insomnia  Hematologic/Lymphatic: no tendency for easy bleeding, no swollen lymph nodes, no tendency for easy bruising  Immunology: has seasonal allergies, no frequent infections, no frequent illnesses  Endocrine: no temperature intolerance     OBJECTIVE:  BP (!) 140/62   Pulse 68   Temp 98 °F (36.7 °C)   Resp 14   Ht 5' 11\" (1.803 m)   Wt 251 lb (113.9 kg)   SpO2 98%   BMI 35.01 kg/m²      Wt Readings from Last 3 Encounters:   11/16/22 251 lb (113.9 kg)   08/03/22 252 lb (114.3 kg)   07/11/22 250 lb 3.2 oz (113.5 kg)       Constitutional: no acute distress, well appearing and well nourished  Psychiatric: oriented to person, place and time, judgement and insight and normal, recent and remote memory and intact and mood and affect are normal  Skin: skin and subcutaneous tissue is normal without mass, normal turgor  Head and Face: examination of head and face revealed no abnormalities  Eyes: no lid or conjunctival swelling, erythema or discharge, pupils are normal, equal, round, reactive to light  Ears/Nose: external inspection of ears and nose revealed no abnormalities, hearing is grossly normal  Oropharynx/Mouth/Face: lips, tongue and gums are normal with no lesions, the voice quality was normal  Neck: neck is supple and symmetric, with midline trachea and no masses, thyroid left lobe absent, right lobe normal  Lymphatics: normal cervical lymph nodes, normal supraclavicular nodes  Pulmonary: no increased work of breathing or signs of respiratory distress, lungs are clear to auscultation  Cardiovascular: normal heart rate and rhythm, normal S1 and S2, no murmurs and pedal pulses and 2+ bilaterally, No edema  Abdomen: abdomen is soft, non-tender with no masses  Musculoskeletal: normal gait and station and exam of the digits and nails are normal  Neurological: normal coordination and normal general cortical function    Visual inspection:   Deformity/amputation: absent  Skin lesions/pre-ulcerative calluses: absent  Edema: right- negative, left- negative     Sensory exam:   Monofilament sensation: abnormal  (minimum of 5 random plantar locations tested, avoiding callused areas - > 1 area with absence of sensation is + for neuropathy)  High risk feet     Plus at least one of the following:  Pulses: decreased  Proprioception: Impaired  Vibration (128 Hz): Absent    ASSESSMENT/PLAN:    1. Type 2 diabetes, poorly controlled, with neuropathy (HCC)  Discussed insulin pumps, will consider within 1 year  Discussed Farhad 3  Hemoglobin A1c 10.1-7.8-8.5-8.2-7.6-8.7-8.3-8.6-7.9-7.6-8.1  Patient states he is eating better. On low carb diet. Exercises. - continue medications   - Lantus  50 units bid  - Humalog 25 units qac plus correction 2 units for every 50 of BG above 931 qac  - Trulicity to 4.5 mg weekly  - Jardiance 25 mg daily    2. Acquired hypothyroidism  - Levothyroxine 0.1 mg   - TSH 0.68-1.15-0.55-1.91-1.10-0.38-0.85-0.79    3. Essential hypertension  Same medications  Hypertension well controlled     4.  Mixed hyperlipidemia  HDL 27-27-28-26  LDL [id number]  [phone]  - Atorvastatin   - LDL well controlled 5. Vitamin D deficiency  25OH vit D 45-52-65.8-64.1-56.6-50.5-49.3-57.4-69.0-62.5  Weekly Supplement  - Vitamin D levels within normal limits     6. Obesity  Diet, exercise    7. Elevated creatinine  Follow-up  Creatinine 1.5 -1.1-1.2-1.1-1.2-1.1-1.2-1.3-1.1  Follow with family doctor    Reviewed and/or ordered clinical lab results Yes  Reviewed and/or ordered radiology tests Yes  Reviewed and/or ordered other diagnostic tests No  Discussed test results with performing physician No  Independently reviewed image, tracing, or specimen No  Made a decision to obtain old records No  Reviewed old records Yes  Obtained history from other than patient No    Vy Roman MD was counseled regarding symptoms of diabetes diagnosis, course and complications of disease if inadequately treated, side effects of medications, diagnosis, treatment options, and prognosis, risks, benefits, complications, and alternatives of treatment, labs, imaging and other studies and treatment targets and goals. He understands instructions and counseling. CGMS Download Review and Recommendations  See scanned document for blood glucose tracing documentation  [employer] personal CGMS data downloaded and reviewed. This was separate service provided-CGM interpretation    Average glucose 156 ± 35.2 SD  Time in range: 66%  Time above 180: 33%  Time under 70: 0%     Basal pattern review: Basal hyperglycemia  Postprandial pattern review: Postprandial hyperglycemia  Hypoglycemia review: Rare hypoglycemia  Activity related review: Not recorded      Based on the data, I recommend:    1. Do timely insulin injections    2. Healthy food choices, portion control    3. Do not underestimate carbohydrates    4. Hypoglycemia management    5. Scan farhad 2 every 8 hours, recommend to change to [place] 3 if problem with scanning    6. Continue insulin adjustments. Return in about 4 months (around 3/16/2023) for diabetes.

## 2022-11-17 LAB
C-PEPTIDE: 1.7 NG/ML (ref 1.1–4.4)
ISLET CELL ANTIBODY: NORMAL

## 2022-11-18 LAB
GLUTAMIC ACID DECARB AB: <5 IU/ML (ref 0–5)
INSULIN A: 2.3 U/ML (ref 0–0.4)

## 2022-11-19 LAB — ZINC TRANSPORTER 8 AB: <10 U/ML (ref 0–15)

## 2022-12-01 RX ORDER — INSULIN GLARGINE 100 [IU]/ML
INJECTION, SOLUTION SUBCUTANEOUS
Qty: 30 ML | Refills: 0 | Status: SHIPPED | OUTPATIENT
Start: 2022-12-01

## 2022-12-20 ENCOUNTER — OFFICE VISIT (OUTPATIENT)
Dept: DERMATOLOGY | Age: 72
End: 2022-12-20
Payer: MEDICARE

## 2022-12-20 DIAGNOSIS — Z85.828 HISTORY OF BASAL CELL CARCINOMA: ICD-10-CM

## 2022-12-20 DIAGNOSIS — D48.5 NEOPLASM OF UNCERTAIN BEHAVIOR OF SKIN: Primary | ICD-10-CM

## 2022-12-20 DIAGNOSIS — L82.1 SK (SEBORRHEIC KERATOSIS): ICD-10-CM

## 2022-12-20 PROCEDURE — 11102 TANGNTL BX SKIN SINGLE LES: CPT | Performed by: DERMATOLOGY

## 2022-12-20 PROCEDURE — 1123F ACP DISCUSS/DSCN MKR DOCD: CPT | Performed by: DERMATOLOGY

## 2022-12-20 PROCEDURE — 99212 OFFICE O/P EST SF 10 MIN: CPT | Performed by: DERMATOLOGY

## 2022-12-20 NOTE — PROGRESS NOTES
NEEDED FOR NEXT REFILL** 90 tablet 0    insulin lispro (HUMALOG KWIKPEN) 200 UNIT/ML SOPN pen INJECT 25 UNITS BEFORE MEALS AS DIRECTED PLUS CORRECTION 2 UNITS FOR EVERY 50 OF BLOOD GLUCOSE ABOVE 150 (PRESCRIBER OK IS NEEDED FOR NEXT FILL) 12 mL 1    Insulin Pen Needle (COMFORT EZ PEN NEEDLES) 31G X 5 MM MISC USE 6 TIMES PER DAY OR AS DIRECTED FOR LANTUS - HUMALOG **MAY REFILL** 100 each 11    Cholecalciferol (VITAMIN D3) 50 MCG (2000 UT) CAPS Take by mouth daily Changed dose 8/3/22      benazepril (LOTENSIN) 20 MG tablet Take 1 tablet by mouth daily 90 tablet 1    TRULICITY 4.5 HY/7.8SY SOPN INJECT 4.5 MG INTO THE SKIN ONCE A WEEK AS DIRECTED (PRESCRIBER OK IS NEEDED FOR NEXT FILL) 6 mL 1    omega-3 acid ethyl esters (LOVAZA) 1 g capsule 2 CAPSULES 2 TIMES A DAY **MAY REFILL** (Patient taking differently: 1 cap po q am, 2 cap qhs) 360 capsule 3    JARDIANCE 25 MG tablet 1 TABLET BY MOUTH ONCE DAILY (PRESCRIBER OK IS NEEDED FOR NEXT FILL) 30 tablet 11    amLODIPine (NORVASC) 10 MG tablet 1 TABLET BY MOUTH ONCE DAILY (PRESCRIBER OK NEEDED FOR NEXT REFILL) 90 tablet 3    allopurinol (ZYLOPRIM) 300 MG tablet TAKE 1 TABLET ONCE DAILY **MAY REFILL** 90 tablet 3    potassium citrate (UROCIT-K) 10 MEQ (1080 MG) extended release tablet 1 TABLET ONCE DAILY (PRESCRIBER OK NEEDED FOR NEXT REFILL) 90 tablet 3    atorvastatin (LIPITOR) 20 MG tablet Take 1 tablet by mouth daily 90 tablet 3    Continuous Blood Gluc  (FREESTYLE ALBA READER) DELMIS 1 Units by Does not apply route as needed (as needed) 1 Device 0    Turmeric 500 MG CAPS Take 1,000 mg by mouth daily      UNABLE TO FIND CPAP supplies, water reservoir and tubing. Dx 327.23 1 each 0    aspirin EC 81 MG EC tablet Take 1 tablet by mouth daily. 30 tablet 11    Multiple Vitamin (MULTIVITAMIN PO) Take  by mouth daily.          Physical Examination       The following were examined and determined to be normal: Psych/Neuro, Scalp/hair, Head/face, Conjunctivae/eyelids, Gums/teeth/lips, Neck, Breast/axilla/chest, Abdomen, RUE, LUE, RLE, LLE, and Nails/digits. The following were examined and determined to be abnormal: Back. Well appearing. 1.  Right central lower back with a round verrucous/keratotic pink plaque. 2.  Back, lower neck: stuck-on appearing tan-brown verrucous papules and plaques. 3.  Clear. Assessment and Plan     1. Neoplasm of uncertain behavior of skin, right central lower back - ? SCC    Discussed possible diagnosis; patient agreeable to proceed with biopsy (written consent obtained). Risks of the procedure were reviewed including discomfort, bleeding, scar and infection. The area(s) to be biopsied were marked with a surgical pen. Alcohol was used to cleanse the site. Local anesthesia was acheived with 1% lidocaine with epinephrine. Shave biopsy was performed using a razor blade. Hemostasis was achieved with aluminum chloride. The wound(s) were dressed with petrolatum and covered with a bandage. Wound care instructions were reviewed. 1 Specimen (s) sent to pathology. The specimen bottles were appropriately labeled. The patient tolerated the procedure well and there were no immediate complications. 2. SK (seborrheic keratosis)     Reassurance. 3. History of basal cell carcinoma - clear    Sun protective behaviors, including use of at least SPF 30 sunscreen, and self skin examinations were encouraged. Call for any new or concerning lesions. Return in about 1 year (around 12/20/2023).     --Zach Madrid MD

## 2022-12-20 NOTE — PATIENT INSTRUCTIONS

## 2022-12-22 LAB — DERMATOLOGY PATHOLOGY REPORT: NORMAL

## 2023-01-03 RX ORDER — INSULIN GLARGINE 100 [IU]/ML
INJECTION, SOLUTION SUBCUTANEOUS
Qty: 30 ML | Refills: 0 | Status: SHIPPED | OUTPATIENT
Start: 2023-01-03 | End: 2023-02-06

## 2023-01-03 RX ORDER — FLASH GLUCOSE SENSOR
KIT MISCELLANEOUS
Qty: 2 EACH | Refills: 1 | Status: SHIPPED | OUTPATIENT
Start: 2023-01-03

## 2023-01-04 RX ORDER — DULAGLUTIDE 4.5 MG/.5ML
INJECTION, SOLUTION SUBCUTANEOUS
Qty: 6 ML | Refills: 0 | Status: SHIPPED | OUTPATIENT
Start: 2023-01-04 | End: 2023-02-07

## 2023-01-11 DIAGNOSIS — E03.9 ACQUIRED HYPOTHYROIDISM: ICD-10-CM

## 2023-01-11 DIAGNOSIS — E78.2 MIXED HYPERLIPIDEMIA: ICD-10-CM

## 2023-01-11 DIAGNOSIS — E29.1 TESTOSTERONE DEFICIENCY IN MALE: ICD-10-CM

## 2023-01-11 DIAGNOSIS — I10 ESSENTIAL HYPERTENSION: ICD-10-CM

## 2023-01-11 RX ORDER — LEVOTHYROXINE SODIUM 0.1 MG/1
TABLET ORAL
Qty: 90 TABLET | Refills: 0 | Status: SHIPPED | OUTPATIENT
Start: 2023-01-11

## 2023-01-11 RX ORDER — ATORVASTATIN CALCIUM 20 MG/1
20 TABLET, FILM COATED ORAL DAILY
Qty: 90 TABLET | Refills: 3 | Status: SHIPPED | OUTPATIENT
Start: 2023-01-11

## 2023-01-11 RX ORDER — POTASSIUM CITRATE 10 MEQ/1
10 TABLET, EXTENDED RELEASE ORAL DAILY
Qty: 90 TABLET | Refills: 3 | Status: SHIPPED | OUTPATIENT
Start: 2023-01-11

## 2023-01-12 RX ORDER — TESTOSTERONE 16.2 MG/G
GEL TRANSDERMAL
Qty: 75 G | Refills: 0 | Status: SHIPPED | OUTPATIENT
Start: 2023-01-12 | End: 2023-02-11

## 2023-01-20 DIAGNOSIS — M1A.9XX0 CHRONIC GOUT WITHOUT TOPHUS, UNSPECIFIED CAUSE, UNSPECIFIED SITE: ICD-10-CM

## 2023-01-23 DIAGNOSIS — I10 ESSENTIAL HYPERTENSION: ICD-10-CM

## 2023-01-23 RX ORDER — ALLOPURINOL 300 MG/1
TABLET ORAL
Qty: 90 TABLET | Refills: 3 | Status: SHIPPED | OUTPATIENT
Start: 2023-01-23

## 2023-01-23 RX ORDER — AMLODIPINE BESYLATE 10 MG/1
TABLET ORAL
Qty: 90 TABLET | Refills: 2 | Status: SHIPPED | OUTPATIENT
Start: 2023-01-23

## 2023-01-23 NOTE — TELEPHONE ENCOUNTER
Medication:   Requested Prescriptions     Pending Prescriptions Disp Refills    allopurinol (ZYLOPRIM) 300 MG tablet [Pharmacy Med Name: ALLOPURINOL 300MG TAB ACCOR 300 Tablet] 90 tablet 3     Sig: TAKE 1 TABLET ONCE DAILY (PRESCRIBER OK IS NEEDED FOR NEXT FILL)           Patient Phone Number: [phone] (home)     Last appt: 7/11/2022   Next appt: 1/30/2023

## 2023-01-26 ENCOUNTER — TELEPHONE (OUTPATIENT)
Dept: INTERNAL MEDICINE CLINIC | Age: 73
End: 2023-01-26

## 2023-01-26 NOTE — TELEPHONE ENCOUNTER
Called about scheduling AWV for when in office on Monday to see PCP if unable can do in person or VV any other time.

## 2023-01-30 ENCOUNTER — OFFICE VISIT (OUTPATIENT)
Dept: INTERNAL MEDICINE CLINIC | Age: 73
End: 2023-01-30
Payer: MEDICARE

## 2023-01-30 VITALS
OXYGEN SATURATION: 97 % | HEIGHT: 71 IN | BODY MASS INDEX: 35.56 KG/M2 | WEIGHT: 254 LBS | HEART RATE: 73 BPM | SYSTOLIC BLOOD PRESSURE: 98 MMHG | DIASTOLIC BLOOD PRESSURE: 56 MMHG

## 2023-01-30 DIAGNOSIS — I49.9 IRREGULAR HEART BEAT: ICD-10-CM

## 2023-01-30 DIAGNOSIS — I10 ESSENTIAL HYPERTENSION: ICD-10-CM

## 2023-01-30 DIAGNOSIS — I49.1 PAC (PREMATURE ATRIAL CONTRACTION): ICD-10-CM

## 2023-01-30 DIAGNOSIS — E11.42 DIABETIC POLYNEUROPATHY ASSOCIATED WITH TYPE 2 DIABETES MELLITUS (HCC): Primary | ICD-10-CM

## 2023-01-30 LAB
ANION GAP SERPL CALCULATED.3IONS-SCNC: 13 MMOL/L (ref 3–16)
BUN BLDV-MCNC: 20 MG/DL (ref 7–20)
CALCIUM SERPL-MCNC: 9.9 MG/DL (ref 8.3–10.6)
CHLORIDE BLD-SCNC: 101 MMOL/L (ref 99–110)
CO2: 24 MMOL/L (ref 21–32)
CREAT SERPL-MCNC: 1.3 MG/DL (ref 0.8–1.3)
GFR SERPL CREATININE-BSD FRML MDRD: 58 ML/MIN/{1.73_M2}
GLUCOSE BLD-MCNC: 134 MG/DL (ref 70–99)
HCT VFR BLD CALC: 43.4 % (ref 40.5–52.5)
HEMOGLOBIN: 14.1 G/DL (ref 13.5–17.5)
MCH RBC QN AUTO: 29 PG (ref 26–34)
MCHC RBC AUTO-ENTMCNC: 32.5 G/DL (ref 31–36)
MCV RBC AUTO: 89.3 FL (ref 80–100)
PDW BLD-RTO: 15.3 % (ref 12.4–15.4)
PLATELET # BLD: 308 K/UL (ref 135–450)
PMV BLD AUTO: 11.5 FL (ref 5–10.5)
POTASSIUM SERPL-SCNC: 4.4 MMOL/L (ref 3.5–5.1)
RBC # BLD: 4.86 M/UL (ref 4.2–5.9)
SODIUM BLD-SCNC: 138 MMOL/L (ref 136–145)
TSH REFLEX: 0.86 UIU/ML (ref 0.27–4.2)
WBC # BLD: 9 K/UL (ref 4–11)

## 2023-01-30 PROCEDURE — 93000 ELECTROCARDIOGRAM COMPLETE: CPT | Performed by: INTERNAL MEDICINE

## 2023-01-30 PROCEDURE — 1123F ACP DISCUSS/DSCN MKR DOCD: CPT | Performed by: INTERNAL MEDICINE

## 2023-01-30 PROCEDURE — 3078F DIAST BP <80 MM HG: CPT | Performed by: INTERNAL MEDICINE

## 2023-01-30 PROCEDURE — 99214 OFFICE O/P EST MOD 30 MIN: CPT | Performed by: INTERNAL MEDICINE

## 2023-01-30 PROCEDURE — 3074F SYST BP LT 130 MM HG: CPT | Performed by: INTERNAL MEDICINE

## 2023-01-30 RX ORDER — AMLODIPINE BESYLATE 5 MG/1
TABLET ORAL
Qty: 90 TABLET | Refills: 1 | Status: SHIPPED | OUTPATIENT
Start: 2023-01-30

## 2023-01-30 ASSESSMENT — ENCOUNTER SYMPTOMS
NAUSEA: 0
CHEST TIGHTNESS: 0
SHORTNESS OF BREATH: 0
PHOTOPHOBIA: 0
WHEEZING: 0
ABDOMINAL PAIN: 0
VOMITING: 0
TROUBLE SWALLOWING: 0

## 2023-01-30 ASSESSMENT — PATIENT HEALTH QUESTIONNAIRE - PHQ9
2. FEELING DOWN, DEPRESSED OR HOPELESS: 0
SUM OF ALL RESPONSES TO PHQ QUESTIONS 1-9: 0
SUM OF ALL RESPONSES TO PHQ9 QUESTIONS 1 & 2: 0
SUM OF ALL RESPONSES TO PHQ QUESTIONS 1-9: 0
1. LITTLE INTEREST OR PLEASURE IN DOING THINGS: 0

## 2023-01-30 NOTE — PROGRESS NOTES
Otis Orozco MD  1950  male  67 y.o. SUBJECTIVE:       Chief Complaint   Patient presents with    6 Month Follow-Up    Diabetes       HPI:  Follow-up visit for chronic problems. Incidentally noted to have low blood pressure as above  Patient report he has increased his daily activities recently. Patient denies chest pain palpitation dizziness shortness of breath. Denies any signs symptoms of infection. Denies any change of color of his stool. Denies abdominal pain nausea vomiting urinary symptoms. History of diabetes mellitus he continue to follow-up with endocrinologist.    Past Medical History:   Diagnosis Date    Hearing loss     Hypogonadism in male     Hypothyroidism     Kidney stones     MAO (obstructive sleep apnea)     Type II or unspecified type diabetes mellitus without mention of complication, not stated as uncontrolled      Past Surgical History:   Procedure Laterality Date    CATARACT REMOVAL WITH IMPLANT Bilateral     CHOLECYSTECTOMY      9/11    COLONOSCOPY      2009    COLONOSCOPY  01/14/2021    ,Normal colonoscopy,Diverticulosis. repeat 5-7 years. THYROIDECTOMY      lt lobectomy 2/2010    TONSILLECTOMY      TOOTH EXTRACTION       Social History     Socioeconomic History    Marital status:       Spouse name: None    Number of children: None    Years of education: None    Highest education level: None   Occupational History    Occupation: retired pediatrician   Tobacco Use    Smoking status: Never    Smokeless tobacco: Never   Vaping Use    Vaping Use: Never used   Substance and Sexual Activity    Alcohol use: Not Currently     Alcohol/week: 1.0 standard drink     Types: 1 Cans of beer per week     Comment: 4 of 7 days     Drug use: No    Sexual activity: Never     Social Determinants of Health     Financial Resource Strain: Low Risk     Difficulty of Paying Living Expenses: Not hard at all   Food Insecurity: No Food Insecurity    Worried About Running Out of Food in the Last Year: Never true    Ran Out of Food in the Last Year: Never true     Family History   Problem Relation Age of Onset    Cancer Father         skin    Coronary Art Dis Father     Diabetes Father     Coronary Art Dis Paternal Uncle     Diabetes Paternal Uncle     Stroke Paternal Uncle     Heart Disease Neg Hx     High Blood Pressure Neg Hx     Osteoporosis Neg Hx     Thyroid Disease Neg Hx     Heart Attack Neg Hx        Review of Systems   Constitutional:  Negative for diaphoresis and unexpected weight change. HENT:  Negative for trouble swallowing. Eyes:  Negative for photophobia and visual disturbance. Respiratory:  Negative for chest tightness, shortness of breath and wheezing. Cardiovascular:  Negative for chest pain, palpitations and leg swelling. Gastrointestinal:  Negative for abdominal pain, nausea and vomiting. Neurological:  Negative for dizziness, tremors, seizures, syncope, facial asymmetry, weakness, light-headedness and headaches. OBJECTIVE:  Pulse Readings from Last 4 Encounters:   01/30/23 73   11/16/22 68   08/03/22 74   07/11/22 69     Wt Readings from Last 4 Encounters:   01/30/23 254 lb (115.2 kg)   11/16/22 251 lb (113.9 kg)   08/03/22 252 lb (114.3 kg)   07/11/22 250 lb 3.2 oz (113.5 kg)     BP Readings from Last 4 Encounters:   01/30/23 (!) 98/56   11/16/22 (!) 140/62   08/03/22 (!) 100/55   07/11/22 100/60     Physical Exam  Vitals and nursing note reviewed. Constitutional:       Appearance: He is obese. He is not ill-appearing. Eyes:      Conjunctiva/sclera: Conjunctivae normal.   Cardiovascular:      Rate and Rhythm: Normal rate. Rhythm irregular. Pulses: Normal pulses. Pulmonary:      Effort: Pulmonary effort is normal.      Breath sounds: Normal breath sounds. Abdominal:      General: Bowel sounds are normal.   Musculoskeletal:      Right lower leg: No edema. Left lower leg: No edema.    Skin:     Comments: Sensory exam of the foot is abnormal tested with the monofilament. Plus symmetrical decreased dorsalis pedis pulse. History of abnormal Doppler study. Patient was evaluated by vascular surgeon in the past.  Denies any resting foot pain or leg pain. No intermittent claudication. Neurological:      General: No focal deficit present. Mental Status: He is alert and oriented to person, place, and time.    Psychiatric:         Mood and Affect: Mood normal.         Behavior: Behavior normal.       CBC:   Lab Results   Component Value Date/Time    WBC 9.5 07/14/2021 04:23 PM    HGB 14.3 07/14/2021 04:23 PM    HCT 42.2 07/14/2021 04:23 PM     07/14/2021 04:23 PM     CMP:  Lab Results   Component Value Date/Time     11/15/2022 09:02 AM    K 4.7 11/15/2022 09:02 AM     11/15/2022 09:02 AM    CO2 23 11/15/2022 09:02 AM    ANIONGAP 18 11/15/2022 09:02 AM    GLUCOSE 119 11/15/2022 09:02 AM    BUN 22 11/15/2022 09:02 AM    CREATININE 1.1 11/15/2022 09:02 AM    GFRAA >60 08/02/2022 08:13 AM    GFRAA >60 01/19/2013 08:53 AM    CALCIUM 10.0 11/15/2022 09:02 AM    PROT 7.3 11/15/2022 09:02 AM    PROT 7.5 02/16/2013 08:13 AM    LABALBU 4.7 11/15/2022 09:02 AM    AGRATIO 1.8 11/15/2022 09:02 AM    BILITOT 0.5 11/15/2022 09:02 AM    ALKPHOS 116 11/15/2022 09:02 AM    ALT 28 11/15/2022 09:02 AM    AST 24 11/15/2022 09:02 AM    GLOB 3.1 09/14/2021 08:57 AM     URINALYSIS:  Lab Results   Component Value Date/Time    LABMICR <1.20 11/15/2022 09:01 AM     HBA1C:   Lab Results   Component Value Date/Time    LABA1C 8.1 11/15/2022 09:02 AM    .8 11/15/2022 09:02 AM     MICRO/ALB:   Lab Results   Component Value Date/Time    LABMICR <1.20 11/15/2022 09:01 AM    LABCREA 95.9 11/15/2022 09:01 AM    MALBCR see below 11/15/2022 09:01 AM     LIPID:  Lab Results   Component Value Date/Time    CHOL 126 11/15/2022 09:02 AM    TRIG 207 11/15/2022 09:02 AM    HDL 28 11/15/2022 09:02 AM    HDL 27 03/17/2012 08:21 AM    LDLCALC 57 11/15/2022 09:02 AM LABVLDL 41 11/15/2022 09:02 AM     TSH:   Lab Results   Component Value Date/Time    TSHREFLEX 0.93 07/01/2020 09:05 AM     PSA:   Lab Results   Component Value Date/Time    PSA 3.77 08/02/2022 08:13 AM        ASSESSMENT/PLAN:  Slightly low blood pressure without any concerning symptoms. Patient is on multiple medications that can affect blood pressure. Will reduce amlodipine to 5 mg/day. Continue other antihypertensive. History of diabetes mellitus. Patient will continue to follow-up with endocrinologist.    1. Diabetic polyneuropathy associated with type 2 diabetes mellitus ([facility] [address]   Patient is encouraged to make appointment with ophthalmologist.  Diabetic polyneuropathy has been stable. 2. Irregular heart beat   EKG shows atrial premature complex. Ventricular rate 76/min. Patient is asymptomatic. We will check electrolytes as well as thyroid test and CBC. 3. Essential hypertension   Reduce amlodipine. Continue current benazepril.    4. PAC (premature atrial contraction)    Continue to follow-up        Orders Placed This Encounter   Procedures    CBC     Standing Status:   Future     Number of Occurrences:   1     Standing Expiration Date:   9/81/5687    Basic Metabolic Panel     Standing Status:   Future     Number of Occurrences:   1     Standing Expiration Date:   1/30/2024    TSH with Reflex     Standing Status:   Future     Number of Occurrences:   1     Standing Expiration Date:   1/30/2024    EKG 12 Lead     Order Specific Question:   Reason for Exam?     Answer:   Irregular heart rate    HM DIABETES FOOT EXAM     Current Outpatient Medications   Medication Sig Dispense Refill    amLODIPine (NORVASC) 5 MG tablet 1 TABLET BY MOUTH ONCE DAILY (PRESCRIBER OK IS NEEDED FOR NEXT FILL) 90 tablet 1    allopurinol (ZYLOPRIM) 300 MG tablet TAKE 1 TABLET ONCE DAILY (PRESCRIBER OK IS NEEDED FOR NEXT FILL) 90 tablet 3    Testosterone (ANDROGEL) 20.25 MG/ACT (1.62%) GEL gel APPLY 2 PUMPS TO SKIN ONCE DAILY AS DIRECTED **NO REFILLS-PRESCRIBER OK NEEDED FOR NEXT REFILL** 75 g 0    levothyroxine (SYNTHROID) 100 MCG tablet 1 TABLET BY MOUTH ONCE DAILY **NO REFILLS-PRESCRIBER OK NEEDED FOR NEXT REFILL** 90 tablet 0    potassium citrate (UROCIT-K) 10 MEQ (1080 MG) extended release tablet Take 1 tablet by mouth daily 90 tablet 3    atorvastatin (LIPITOR) 20 MG tablet Take 1 tablet by mouth daily 90 tablet 3    TRULICITY 4.5 US/9.0XT SOPN INJECT 4.5 MG INTO THE SKIN ONCE A WEEK AS DIRECTED (PRESCRIBER OK IS NEEDED FOR NEXT FILL) 6 mL 0    insulin glargine (LANTUS SOLOSTAR) 100 UNIT/ML injection pen INJECT 50 UNITS SUBCUTANEOUSLY 2 TIMES A DAY (PRESCRIBER OK IS NEEDED FOR NEXT FILL) **REFRIGERATE** 30 mL 0    Continuous Blood Gluc Sensor (FREESTYLE ALBA 14 DAY SENSOR) MISC USE 1 SENSOR EVERY 14 DAYS (PRESCRIBER OK NEEDED FOR NEXT REFILL) 2 each 1    insulin lispro (HUMALOG KWIKPEN) 200 UNIT/ML SOPN pen INJECT 25 UNITS BEFORE MEALS AS DIRECTED PLUS CORRECTION 2 UNITS FOR EVERY 50 OF BLOOD GLUCOSE ABOVE 150 (PRESCRIBER OK IS NEEDED FOR NEXT FILL) 12 mL 1    Insulin Pen Needle (COMFORT EZ PEN NEEDLES) 31G X 5 MM MISC USE 6 TIMES PER DAY OR AS DIRECTED FOR LANTUS - HUMALOG **MAY REFILL** 100 each 11    Cholecalciferol (VITAMIN D3) 50 MCG (2000 UT) CAPS Take by mouth daily Changed dose 8/3/22      benazepril (LOTENSIN) 20 MG tablet Take 1 tablet by mouth daily 90 tablet 1    omega-3 acid ethyl esters (LOVAZA) 1 g capsule 2 CAPSULES 2 TIMES A DAY **MAY REFILL** (Patient taking differently: 1 cap po q am, 2 cap qhs) 360 capsule 3    JARDIANCE 25 MG tablet 1 TABLET BY MOUTH ONCE DAILY (PRESCRIBER OK IS NEEDED FOR NEXT FILL) 30 tablet 11    Continuous Blood Gluc  (FREESTYLE ALBA READER) DELMIS 1 Units by Does not apply route as needed (as needed) 1 Device 0    Turmeric 500 MG CAPS Take 1,000 mg by mouth daily      UNABLE TO FIND CPAP supplies, water reservoir and tubing.  Dx 327.23 1 each 0    aspirin EC 81 MG EC tablet Take 1 tablet by mouth daily. 30 tablet 11    Multiple Vitamin (MULTIVITAMIN PO) Take  by mouth daily. No current facility-administered medications for this visit. Return in about 3 months (around 4/30/2023). An After Visit Summary was printed and given to the patient. Documentation was done using voice recognition dragon software. Every effort was made to ensure accuracy; however, inadvertent  Unintentional computerized transcription errors may be present.

## 2023-02-06 RX ORDER — INSULIN GLARGINE 100 [IU]/ML
INJECTION, SOLUTION SUBCUTANEOUS
Qty: 30 ML | Refills: 0 | Status: SHIPPED | OUTPATIENT
Start: 2023-02-06 | End: 2023-03-09

## 2023-02-06 RX ORDER — INSULIN LISPRO 200 [IU]/ML
INJECTION, SOLUTION SUBCUTANEOUS
Qty: 12 ML | Refills: 0 | Status: SHIPPED | OUTPATIENT
Start: 2023-02-06 | End: 2023-03-09

## 2023-02-07 RX ORDER — DULAGLUTIDE 4.5 MG/.5ML
INJECTION, SOLUTION SUBCUTANEOUS
Qty: 2 ML | Refills: 2 | Status: SHIPPED | OUTPATIENT
Start: 2023-02-07 | End: 2023-04-05 | Stop reason: ALTCHOICE

## 2023-03-09 RX ORDER — INSULIN GLARGINE 100 [IU]/ML
INJECTION, SOLUTION SUBCUTANEOUS
Qty: 30 ML | Refills: 0 | Status: SHIPPED | OUTPATIENT
Start: 2023-03-09

## 2023-03-09 RX ORDER — INSULIN LISPRO 200 [IU]/ML
INJECTION, SOLUTION SUBCUTANEOUS
Qty: 12 ML | Refills: 0 | Status: SHIPPED | OUTPATIENT
Start: 2023-03-09

## 2023-03-29 DIAGNOSIS — E29.1 TESTOSTERONE DEFICIENCY IN MALE: ICD-10-CM

## 2023-03-29 RX ORDER — TESTOSTERONE 16.2 MG/G
GEL TRANSDERMAL
Qty: 75 G | Refills: 2 | Status: SHIPPED | OUTPATIENT
Start: 2023-03-29 | End: 2023-06-27

## 2023-04-05 ENCOUNTER — OFFICE VISIT (OUTPATIENT)
Dept: ENDOCRINOLOGY | Age: 73
End: 2023-04-05
Payer: MEDICARE

## 2023-04-05 VITALS
RESPIRATION RATE: 14 BRPM | HEIGHT: 71 IN | DIASTOLIC BLOOD PRESSURE: 58 MMHG | SYSTOLIC BLOOD PRESSURE: 111 MMHG | BODY MASS INDEX: 35.14 KG/M2 | WEIGHT: 251 LBS | HEART RATE: 71 BPM | OXYGEN SATURATION: 97 % | TEMPERATURE: 98 F

## 2023-04-05 DIAGNOSIS — E66.01 CLASS 2 SEVERE OBESITY WITH SERIOUS COMORBIDITY AND BODY MASS INDEX (BMI) OF 35.0 TO 35.9 IN ADULT, UNSPECIFIED OBESITY TYPE (HCC): ICD-10-CM

## 2023-04-05 DIAGNOSIS — E11.65 POORLY CONTROLLED TYPE 2 DIABETES MELLITUS WITH NEUROPATHY (HCC): Primary | ICD-10-CM

## 2023-04-05 DIAGNOSIS — E11.40 POORLY CONTROLLED TYPE 2 DIABETES MELLITUS WITH NEUROPATHY (HCC): Primary | ICD-10-CM

## 2023-04-05 DIAGNOSIS — E03.9 ACQUIRED HYPOTHYROIDISM: ICD-10-CM

## 2023-04-05 DIAGNOSIS — E78.2 MIXED HYPERLIPIDEMIA: ICD-10-CM

## 2023-04-05 DIAGNOSIS — E55.9 VITAMIN D DEFICIENCY: ICD-10-CM

## 2023-04-05 DIAGNOSIS — R79.89 ELEVATED SERUM CREATININE: ICD-10-CM

## 2023-04-05 DIAGNOSIS — I10 ESSENTIAL HYPERTENSION: ICD-10-CM

## 2023-04-05 PROCEDURE — 3074F SYST BP LT 130 MM HG: CPT | Performed by: INTERNAL MEDICINE

## 2023-04-05 PROCEDURE — 3078F DIAST BP <80 MM HG: CPT | Performed by: INTERNAL MEDICINE

## 2023-04-05 PROCEDURE — 95251 CONT GLUC MNTR ANALYSIS I&R: CPT | Performed by: INTERNAL MEDICINE

## 2023-04-05 PROCEDURE — 99214 OFFICE O/P EST MOD 30 MIN: CPT | Performed by: INTERNAL MEDICINE

## 2023-04-05 PROCEDURE — 1123F ACP DISCUSS/DSCN MKR DOCD: CPT | Performed by: INTERNAL MEDICINE

## 2023-04-05 RX ORDER — SEMAGLUTIDE 1.34 MG/ML
INJECTION, SOLUTION SUBCUTANEOUS
Qty: 3 ADJUSTABLE DOSE PRE-FILLED PEN SYRINGE | Refills: 1 | Status: SHIPPED | OUTPATIENT
Start: 2023-04-05

## 2023-04-05 NOTE — PROGRESS NOTES
normal  Oropharynx/Mouth/Face: lips, tongue and gums are normal with no lesions, the voice quality was normal  Neck: neck is supple and symmetric, with midline trachea and no masses, thyroid left lobe absent, right lobe normal  Lymphatics: normal cervical lymph nodes, normal supraclavicular nodes  Pulmonary: no increased work of breathing or signs of respiratory distress, lungs are clear to auscultation  Cardiovascular: normal heart rate and rhythm, normal S1 and S2, no murmurs and pedal pulses and 2+ bilaterally, No edema  Abdomen: abdomen is soft, non-tender with no masses  Musculoskeletal: normal gait and station and exam of the digits and nails are normal  Neurological: normal coordination and normal general cortical function    Visual inspection:   Deformity/amputation: absent  Skin lesions/pre-ulcerative calluses: absent  Edema: right- negative, left- negative     Sensory exam:   Monofilament sensation: abnormal  (minimum of 5 random plantar locations tested, avoiding callused areas - > 1 area with absence of sensation is + for neuropathy)  High risk feet     Plus at least one of the following:  Pulses: decreased  Proprioception: Impaired  Vibration (128 Hz): Absent    ASSESSMENT/PLAN:    1. Type 2 diabetes, poorly controlled, with neuropathy (HCC)  Discussed insulin pumps, will consider within 1 year  Farhad 2  Stop Trulicity  Start Ozempic 0.5 mg weekly, then increase if tolerated  Hemoglobin A1c 10.1-7.8-8.5-8.2-7.6-8.7-8.3-8.6-7.9-7.6-8.1  Patient states he is eating better. On low carb diet. Exercises. - continue medications   - Lantus  50 units bid  - Jardiance 25 mg daily    2. Acquired hypothyroidism  - Levothyroxine 0.1 mg   - TSH 0.68-1.15-0.55-1.91-1.10-0.38-0.85-0.79-0.86    3. Essential hypertension  Same medications  Hypertension well controlled     4.  Mixed hyperlipidemia  HDL 27-27-28-26  LDL [id number]  [phone]  - Atorvastatin   - LDL well controlled

## 2023-04-11 RX ORDER — INSULIN GLARGINE 100 [IU]/ML
INJECTION, SOLUTION SUBCUTANEOUS
Qty: 30 ML | Refills: 5 | Status: SHIPPED | OUTPATIENT
Start: 2023-04-11

## 2023-05-04 DIAGNOSIS — I10 ESSENTIAL HYPERTENSION: ICD-10-CM

## 2023-05-04 RX ORDER — BENAZEPRIL HYDROCHLORIDE 20 MG/1
TABLET ORAL
Qty: 90 TABLET | Refills: 0 | Status: SHIPPED | OUTPATIENT
Start: 2023-05-04

## 2023-05-25 DIAGNOSIS — E03.9 ACQUIRED HYPOTHYROIDISM: ICD-10-CM

## 2023-05-25 RX ORDER — INSULIN LISPRO 200 [IU]/ML
INJECTION, SOLUTION SUBCUTANEOUS
Qty: 12 ML | Refills: 0 | Status: SHIPPED | OUTPATIENT
Start: 2023-05-25

## 2023-05-25 RX ORDER — LEVOTHYROXINE SODIUM 0.1 MG/1
TABLET ORAL
Qty: 90 TABLET | Refills: 0 | Status: SHIPPED | OUTPATIENT
Start: 2023-05-25

## 2023-06-23 RX ORDER — EMPAGLIFLOZIN 25 MG/1
TABLET, FILM COATED ORAL
Qty: 30 TABLET | Refills: 0 | OUTPATIENT
Start: 2023-06-23

## 2023-06-23 NOTE — TELEPHONE ENCOUNTER
Patient was due back for follow up visit around 4/30/23.     Past Visits    Date Provider Specialty Visit Type Primary Dx   01/30/2023 Love Abebe MD Internal Medicine Office Visit Diabetic polyneuropathy associated with type 2 diabetes mellitus ([address] [facility])

## 2023-07-17 ENCOUNTER — TELEPHONE (OUTPATIENT)
Dept: PRIMARY CARE CLINIC | Age: 73
End: 2023-07-17

## 2023-07-17 NOTE — TELEPHONE ENCOUNTER
Submitted PA for Testosterone Via [facility] Key: [id number]  STATUS: PENDING. Follow up done daily; if no response in three days we will refax for status check. If another three days goes by with no response we will call the insurance for status.

## 2023-07-18 NOTE — TELEPHONE ENCOUNTER
The medication is APPROVED. Coverage Starts on: 4/17/2023 12:00:00 AM,   Coverage Ends on: 7/16/2024 12:00:00 AM.    If this requires a response please respond to the pool ( [id number]). Thank you please advise patient.

## 2023-07-21 DIAGNOSIS — E11.42 DIABETIC POLYNEUROPATHY ASSOCIATED WITH TYPE 2 DIABETES MELLITUS (HCC): ICD-10-CM

## 2023-07-21 RX ORDER — OMEGA-3-ACID ETHYL ESTERS 1 G/1
2 CAPSULE, LIQUID FILLED ORAL 2 TIMES DAILY
Qty: 360 CAPSULE | Refills: 1 | Status: SHIPPED | OUTPATIENT
Start: 2023-07-21

## 2023-07-24 SDOH — ECONOMIC STABILITY: FOOD INSECURITY: WITHIN THE PAST 12 MONTHS, YOU WORRIED THAT YOUR FOOD WOULD RUN OUT BEFORE YOU GOT MONEY TO BUY MORE.: NEVER TRUE

## 2023-07-24 SDOH — ECONOMIC STABILITY: HOUSING INSECURITY
IN THE LAST 12 MONTHS, WAS THERE A TIME WHEN YOU DID NOT HAVE A STEADY PLACE TO SLEEP OR SLEPT IN A SHELTER (INCLUDING NOW)?: NO

## 2023-07-24 SDOH — ECONOMIC STABILITY: INCOME INSECURITY: HOW HARD IS IT FOR YOU TO PAY FOR THE VERY BASICS LIKE FOOD, HOUSING, MEDICAL CARE, AND HEATING?: NOT VERY HARD

## 2023-07-24 SDOH — ECONOMIC STABILITY: TRANSPORTATION INSECURITY
IN THE PAST 12 MONTHS, HAS LACK OF TRANSPORTATION KEPT YOU FROM MEETINGS, WORK, OR FROM GETTING THINGS NEEDED FOR DAILY LIVING?: NO

## 2023-07-24 SDOH — ECONOMIC STABILITY: FOOD INSECURITY: WITHIN THE PAST 12 MONTHS, THE FOOD YOU BOUGHT JUST DIDN'T LAST AND YOU DIDN'T HAVE MONEY TO GET MORE.: NEVER TRUE

## 2023-07-26 DIAGNOSIS — E55.9 VITAMIN D DEFICIENCY: ICD-10-CM

## 2023-07-26 DIAGNOSIS — E03.9 ACQUIRED HYPOTHYROIDISM: ICD-10-CM

## 2023-07-26 DIAGNOSIS — E78.2 MIXED HYPERLIPIDEMIA: ICD-10-CM

## 2023-07-26 DIAGNOSIS — I10 ESSENTIAL HYPERTENSION: ICD-10-CM

## 2023-07-26 DIAGNOSIS — R79.89 ELEVATED SERUM CREATININE: ICD-10-CM

## 2023-07-26 DIAGNOSIS — E11.65 POORLY CONTROLLED TYPE 2 DIABETES MELLITUS WITH NEUROPATHY (HCC): ICD-10-CM

## 2023-07-26 DIAGNOSIS — E11.40 POORLY CONTROLLED TYPE 2 DIABETES MELLITUS WITH NEUROPATHY (HCC): ICD-10-CM

## 2023-07-26 LAB
25(OH)D3 SERPL-MCNC: 69.8 NG/ML
ALBUMIN SERPL-MCNC: 4.4 G/DL (ref 3.4–5)
ALBUMIN/GLOB SERPL: 1.9 {RATIO} (ref 1.1–2.2)
ALP SERPL-CCNC: 104 U/L (ref 40–129)
ALT SERPL-CCNC: 28 U/L (ref 10–40)
ANION GAP SERPL CALCULATED.3IONS-SCNC: 16 MMOL/L (ref 3–16)
AST SERPL-CCNC: 23 U/L (ref 15–37)
BILIRUB SERPL-MCNC: 0.3 MG/DL (ref 0–1)
BUN SERPL-MCNC: 22 MG/DL (ref 7–20)
CALCIUM SERPL-MCNC: 9.5 MG/DL (ref 8.3–10.6)
CHLORIDE SERPL-SCNC: 103 MMOL/L (ref 99–110)
CHOLEST SERPL-MCNC: 121 MG/DL (ref 0–199)
CO2 SERPL-SCNC: 22 MMOL/L (ref 21–32)
CREAT SERPL-MCNC: 1.1 MG/DL (ref 0.8–1.3)
CREAT UR-MCNC: 90.1 MG/DL (ref 39–259)
GFR SERPLBLD CREATININE-BSD FMLA CKD-EPI: >60 ML/MIN/{1.73_M2}
GLUCOSE SERPL-MCNC: 99 MG/DL (ref 70–99)
HDLC SERPL-MCNC: 30 MG/DL (ref 40–60)
LDL CHOLESTEROL CALCULATED: 49 MG/DL
MICROALBUMIN UR DL<=1MG/L-MCNC: <1.2 MG/DL
MICROALBUMIN/CREAT UR: NORMAL MG/G (ref 0–30)
POTASSIUM SERPL-SCNC: 4.4 MMOL/L (ref 3.5–5.1)
PROT SERPL-MCNC: 6.7 G/DL (ref 6.4–8.2)
SODIUM SERPL-SCNC: 141 MMOL/L (ref 136–145)
T3FREE SERPL-MCNC: 2.3 PG/ML (ref 2.3–4.2)
T4 FREE SERPL-MCNC: 1.2 NG/DL (ref 0.9–1.8)
TRIGL SERPL-MCNC: 208 MG/DL (ref 0–150)
TSH SERPL DL<=0.005 MIU/L-ACNC: 1.37 UIU/ML (ref 0.27–4.2)
VLDLC SERPL CALC-MCNC: 42 MG/DL

## 2023-07-27 ENCOUNTER — OFFICE VISIT (OUTPATIENT)
Dept: INTERNAL MEDICINE CLINIC | Age: 73
End: 2023-07-27
Payer: MEDICARE

## 2023-07-27 VITALS
SYSTOLIC BLOOD PRESSURE: 118 MMHG | DIASTOLIC BLOOD PRESSURE: 62 MMHG | HEART RATE: 73 BPM | HEIGHT: 71 IN | WEIGHT: 248 LBS | BODY MASS INDEX: 34.72 KG/M2 | OXYGEN SATURATION: 96 %

## 2023-07-27 DIAGNOSIS — Z12.5 PROSTATE CANCER SCREENING: ICD-10-CM

## 2023-07-27 DIAGNOSIS — I10 ESSENTIAL HYPERTENSION: ICD-10-CM

## 2023-07-27 DIAGNOSIS — E55.9 VITAMIN D DEFICIENCY: ICD-10-CM

## 2023-07-27 DIAGNOSIS — E78.2 MIXED HYPERLIPIDEMIA: ICD-10-CM

## 2023-07-27 DIAGNOSIS — E29.1 PRIMARY HYPOGONADISM IN MALE: ICD-10-CM

## 2023-07-27 DIAGNOSIS — E11.42 DIABETIC POLYNEUROPATHY ASSOCIATED WITH TYPE 2 DIABETES MELLITUS (HCC): Primary | ICD-10-CM

## 2023-07-27 LAB — HBA1C MFR BLD: 8.7 %

## 2023-07-27 PROCEDURE — 3052F HG A1C>EQUAL 8.0%<EQUAL 9.0%: CPT | Performed by: INTERNAL MEDICINE

## 2023-07-27 PROCEDURE — 83037 HB GLYCOSYLATED A1C HOME DEV: CPT | Performed by: INTERNAL MEDICINE

## 2023-07-27 PROCEDURE — 1123F ACP DISCUSS/DSCN MKR DOCD: CPT | Performed by: INTERNAL MEDICINE

## 2023-07-27 PROCEDURE — 3074F SYST BP LT 130 MM HG: CPT | Performed by: INTERNAL MEDICINE

## 2023-07-27 PROCEDURE — 99214 OFFICE O/P EST MOD 30 MIN: CPT | Performed by: INTERNAL MEDICINE

## 2023-07-27 PROCEDURE — 3078F DIAST BP <80 MM HG: CPT | Performed by: INTERNAL MEDICINE

## 2023-07-27 RX ORDER — ERGOCALCIFEROL 1.25 MG/1
CAPSULE ORAL
Qty: 12 CAPSULE | Refills: 3 | Status: SHIPPED | OUTPATIENT
Start: 2023-07-27

## 2023-07-27 ASSESSMENT — ENCOUNTER SYMPTOMS
VOMITING: 0
SHORTNESS OF BREATH: 0
WHEEZING: 0
ABDOMINAL PAIN: 0
TROUBLE SWALLOWING: 0
NAUSEA: 0
VOICE CHANGE: 0

## 2023-07-27 NOTE — PROGRESS NOTES
EVERY WEEK    Essential hypertension  Excellent blood pressure control. Continue benazepril amlodipine  Mixed hyperlipidemia  Plan lipid profile. Currently on atorvastatin and Lovaza  Continue diet lifestyle changes  Primary hypogonadism in male  Stable symptoms. Continue current testosterone gel supplement. Prostate cancer screening  -     PSA Screening;  Future    Patient is advised to use over-the-counter Lotrimin cream/ointment for bilateral early athlete's foot for at least 4 weeks      Current Outpatient Medications   Medication Sig Dispense Refill    vitamin D (ERGOCALCIFEROL) 1.25 MG (23847 UT) CAPS capsule 1 CAPSULE EVERY WEEK 12 capsule 3    omega-3 acid ethyl esters (LOVAZA) 1 g capsule Take 2 capsules by mouth 2 times daily 360 capsule 1    empagliflozin (JARDIANCE) 25 MG tablet Take 1 tablet by mouth daily 90 tablet 1    Testosterone (ANDROGEL) 20.25 MG/ACT (1.62%) GEL gel APPLY 2 PUMPS TO SKIN ONCE DAILY AS DIRECTED  REFILLABLE  75 g 2    HUMALOG KWIKPEN 200 UNIT/ML SOPN pen INJECT 25 UNITS BEFORE MEALS AS DIRECTED PLUS CORRECTION 2 UNITS FOR EVERY 50 OF BLOOD GLUCOSE ABOVE 150 (PRESCRIBER OK IS NEEDED FOR NEXT FILL) 12 mL 0    levothyroxine (SYNTHROID) 100 MCG tablet 1 TABLET BY MOUTH ONCE DAILY  90 tablet 0    benazepril (LOTENSIN) 20 MG tablet 1 TABLET BY MOUTH ONCE DAY (PRESCRIBER OK IS NEEDED FOR NEXT REFILL) 90 tablet 0    LANTUS SOLOSTAR 100 UNIT/ML injection pen INJECT 50 UNITS SUBCUTANEOUSLY 2 TIMES A DAY (PRESCRIBER OK IS NEEDED FOR NEXT FILL) 30 mL 0    Semaglutide,0.25 or 0.5MG/DOS, (OZEMPIC, 0.25 OR 0.5 MG/DOSE,) 2 MG/1.5ML SOPN 0.5 mg weekly (Patient taking differently: Went back on 0.25) 3 Adjustable Dose Pre-filled Pen Syringe 1    amLODIPine (NORVASC) 5 MG tablet 1 TABLET BY MOUTH ONCE DAILY (PRESCRIBER OK IS NEEDED FOR NEXT FILL) 90 tablet 1    allopurinol (ZYLOPRIM) 300 MG tablet TAKE 1 TABLET ONCE DAILY (PRESCRIBER OK IS NEEDED FOR NEXT FILL) 90 tablet 3    potassium citrate

## 2023-08-03 DIAGNOSIS — I10 ESSENTIAL HYPERTENSION: ICD-10-CM

## 2023-08-03 RX ORDER — BENAZEPRIL HYDROCHLORIDE 20 MG/1
TABLET ORAL
Qty: 90 TABLET | Refills: 3 | Status: SHIPPED | OUTPATIENT
Start: 2023-08-03

## 2023-08-29 RX ORDER — INSULIN LISPRO 200 [IU]/ML
INJECTION, SOLUTION SUBCUTANEOUS
Qty: 12 ML | Refills: 0 | Status: SHIPPED | OUTPATIENT
Start: 2023-08-29

## 2023-09-05 RX ORDER — SEMAGLUTIDE 0.68 MG/ML
INJECTION, SOLUTION SUBCUTANEOUS
Qty: 3 ML | Refills: 0 | Status: SHIPPED | OUTPATIENT
Start: 2023-09-05 | End: 2023-09-06

## 2023-09-06 ENCOUNTER — OFFICE VISIT (OUTPATIENT)
Dept: ENDOCRINOLOGY | Age: 73
End: 2023-09-06

## 2023-09-06 VITALS
TEMPERATURE: 98 F | HEART RATE: 65 BPM | OXYGEN SATURATION: 96 % | DIASTOLIC BLOOD PRESSURE: 62 MMHG | WEIGHT: 245 LBS | SYSTOLIC BLOOD PRESSURE: 135 MMHG | RESPIRATION RATE: 14 BRPM | BODY MASS INDEX: 34.3 KG/M2 | HEIGHT: 71 IN

## 2023-09-06 DIAGNOSIS — E29.1 TESTOSTERONE DEFICIENCY IN MALE: ICD-10-CM

## 2023-09-06 DIAGNOSIS — E11.40 POORLY CONTROLLED TYPE 2 DIABETES MELLITUS WITH NEUROPATHY (HCC): Primary | ICD-10-CM

## 2023-09-06 DIAGNOSIS — E78.2 MIXED HYPERLIPIDEMIA: ICD-10-CM

## 2023-09-06 DIAGNOSIS — E66.9 CLASS 1 OBESITY WITH SERIOUS COMORBIDITY AND BODY MASS INDEX (BMI) OF 34.0 TO 34.9 IN ADULT, UNSPECIFIED OBESITY TYPE: ICD-10-CM

## 2023-09-06 DIAGNOSIS — E11.65 POORLY CONTROLLED TYPE 2 DIABETES MELLITUS WITH NEUROPATHY (HCC): Primary | ICD-10-CM

## 2023-09-06 DIAGNOSIS — R79.89 ELEVATED SERUM CREATININE: ICD-10-CM

## 2023-09-06 DIAGNOSIS — I10 ESSENTIAL HYPERTENSION: ICD-10-CM

## 2023-09-06 DIAGNOSIS — E55.9 VITAMIN D DEFICIENCY: ICD-10-CM

## 2023-09-06 DIAGNOSIS — E03.9 ACQUIRED HYPOTHYROIDISM: ICD-10-CM

## 2023-09-06 PROBLEM — E66.811 CLASS 1 OBESITY WITH SERIOUS COMORBIDITY AND BODY MASS INDEX (BMI) OF 34.0 TO 34.9 IN ADULT: Status: ACTIVE | Noted: 2023-09-06

## 2023-09-06 RX ORDER — SEMAGLUTIDE 0.68 MG/ML
INJECTION, SOLUTION SUBCUTANEOUS
Qty: 9 ML | Refills: 3 | Status: SHIPPED | OUTPATIENT
Start: 2023-09-06

## 2023-09-06 RX ORDER — TESTOSTERONE 16.2 MG/G
GEL TRANSDERMAL
Qty: 300 G | Refills: 1 | Status: SHIPPED | OUTPATIENT
Start: 2023-09-06 | End: 2023-09-06 | Stop reason: CLARIF

## 2023-09-06 RX ORDER — INSULIN LISPRO 200 [IU]/ML
INJECTION, SOLUTION SUBCUTANEOUS
Qty: 54 ML | Refills: 3 | Status: SHIPPED | OUTPATIENT
Start: 2023-09-06

## 2023-09-06 RX ORDER — INSULIN GLARGINE 100 [IU]/ML
INJECTION, SOLUTION SUBCUTANEOUS
Qty: 90 ML | Refills: 3 | Status: SHIPPED | OUTPATIENT
Start: 2023-09-06

## 2023-10-23 RX ORDER — PEN NEEDLE, DIABETIC 31 GX5/16"
NEEDLE, DISPOSABLE MISCELLANEOUS
Qty: 100 EACH | Refills: 11 | Status: SHIPPED | OUTPATIENT
Start: 2023-10-23

## 2023-10-23 NOTE — TELEPHONE ENCOUNTER
Last OV: 7/27/2023  Next OV: 2/7/2024    Next appointment due:around 1/27/2024    Last fill:10/13/22  Refills:11

## 2023-11-08 RX ORDER — SEMAGLUTIDE 0.68 MG/ML
INJECTION, SOLUTION SUBCUTANEOUS
Qty: 3 ML | Refills: 1 | Status: SHIPPED | OUTPATIENT
Start: 2023-11-08 | End: 2024-01-04

## 2023-12-26 DIAGNOSIS — I10 ESSENTIAL HYPERTENSION: ICD-10-CM

## 2023-12-26 RX ORDER — AMLODIPINE BESYLATE 5 MG/1
TABLET ORAL
Qty: 90 TABLET | Refills: 1 | Status: SHIPPED | OUTPATIENT
Start: 2023-12-26

## 2024-01-04 RX ORDER — SEMAGLUTIDE 0.68 MG/ML
INJECTION, SOLUTION SUBCUTANEOUS
Qty: 3 ML | Refills: 5 | Status: SHIPPED | OUTPATIENT
Start: 2024-01-04

## 2024-01-08 DIAGNOSIS — E78.2 MIXED HYPERLIPIDEMIA: ICD-10-CM

## 2024-01-08 RX ORDER — ATORVASTATIN CALCIUM 20 MG/1
TABLET, FILM COATED ORAL
Qty: 90 TABLET | Refills: 1 | Status: SHIPPED | OUTPATIENT
Start: 2024-01-08

## 2024-01-16 DIAGNOSIS — M1A.9XX0 CHRONIC GOUT WITHOUT TOPHUS, UNSPECIFIED CAUSE, UNSPECIFIED SITE: ICD-10-CM

## 2024-01-16 DIAGNOSIS — I10 ESSENTIAL HYPERTENSION: ICD-10-CM

## 2024-01-16 RX ORDER — ALLOPURINOL 300 MG/1
TABLET ORAL
Qty: 90 TABLET | Refills: 0 | Status: SHIPPED | OUTPATIENT
Start: 2024-01-16

## 2024-01-16 RX ORDER — POTASSIUM CITRATE 10 MEQ/1
TABLET, EXTENDED RELEASE ORAL
Qty: 90 TABLET | Refills: 0 | Status: SHIPPED | OUTPATIENT
Start: 2024-01-16

## 2024-01-23 DIAGNOSIS — E29.1 TESTOSTERONE DEFICIENCY IN MALE: Primary | ICD-10-CM

## 2024-01-23 RX ORDER — TESTOSTERONE 16.2 MG/G
2 GEL TRANSDERMAL DAILY
Qty: 75 G | Refills: 0 | Status: SHIPPED | OUTPATIENT
Start: 2024-01-23 | End: 2024-02-22

## 2024-01-26 ENCOUNTER — TELEPHONE (OUTPATIENT)
Dept: INTERNAL MEDICINE CLINIC | Age: 74
End: 2024-01-26

## 2024-01-26 NOTE — TELEPHONE ENCOUNTER
Called pt to schedule AWV. Offered for the pt to schedule with Surekha right after PCP appt on 02/07. If he calls back, please get him scheduled.

## 2024-01-29 ENCOUNTER — TELEPHONE (OUTPATIENT)
Dept: INTERNAL MEDICINE CLINIC | Age: 74
End: 2024-01-29

## 2024-01-29 NOTE — TELEPHONE ENCOUNTER
----- Message from Traci Haile sent at 1/29/2024  2:58 PM EST -----  Subject: Message to Provider    QUESTIONS  Information for Provider? Pt received a call regarding changing his   appointment to an annual physical, would like a call back to discuss   before agreeing to change appt. Please return call.  ---------------------------------------------------------------------------  --------------  CALL BACK INFO  [phone]; Do not leave any message, patient will call back for answer  ---------------------------------------------------------------------------  --------------  SCRIPT ANSWERS  undefined

## 2024-01-30 DIAGNOSIS — E03.9 ACQUIRED HYPOTHYROIDISM: ICD-10-CM

## 2024-01-30 DIAGNOSIS — E78.2 MIXED HYPERLIPIDEMIA: ICD-10-CM

## 2024-01-30 DIAGNOSIS — E11.40 POORLY CONTROLLED TYPE 2 DIABETES MELLITUS WITH NEUROPATHY (HCC): ICD-10-CM

## 2024-01-30 DIAGNOSIS — R79.89 ELEVATED SERUM CREATININE: ICD-10-CM

## 2024-01-30 DIAGNOSIS — E11.65 POORLY CONTROLLED TYPE 2 DIABETES MELLITUS WITH NEUROPATHY (HCC): ICD-10-CM

## 2024-01-30 DIAGNOSIS — E55.9 VITAMIN D DEFICIENCY: ICD-10-CM

## 2024-01-30 LAB
25(OH)D3 SERPL-MCNC: 68.2 NG/ML
ALBUMIN SERPL-MCNC: 4.6 G/DL (ref 3.4–5)
ALBUMIN/GLOB SERPL: 1.9 {RATIO} (ref 1.1–2.2)
ALP SERPL-CCNC: 130 U/L (ref 40–129)
ALT SERPL-CCNC: 33 U/L (ref 10–40)
ANION GAP SERPL CALCULATED.3IONS-SCNC: 11 MMOL/L (ref 3–16)
AST SERPL-CCNC: 24 U/L (ref 15–37)
BILIRUB SERPL-MCNC: 0.3 MG/DL (ref 0–1)
BUN SERPL-MCNC: 20 MG/DL (ref 7–20)
CALCIUM SERPL-MCNC: 9.4 MG/DL (ref 8.3–10.6)
CHLORIDE SERPL-SCNC: 107 MMOL/L (ref 99–110)
CHOLEST SERPL-MCNC: 120 MG/DL (ref 0–199)
CO2 SERPL-SCNC: 26 MMOL/L (ref 21–32)
CREAT SERPL-MCNC: 1.1 MG/DL (ref 0.8–1.3)
CREAT UR-MCNC: 87.8 MG/DL (ref 39–259)
GFR SERPLBLD CREATININE-BSD FMLA CKD-EPI: >60 ML/MIN/{1.73_M2}
GLUCOSE SERPL-MCNC: 135 MG/DL (ref 70–99)
HDLC SERPL-MCNC: 31 MG/DL (ref 40–60)
LDL CHOLESTEROL CALCULATED: 52 MG/DL
MICROALBUMIN UR DL<=1MG/L-MCNC: <1.2 MG/DL
MICROALBUMIN/CREAT UR: NORMAL MG/G (ref 0–30)
POTASSIUM SERPL-SCNC: 4.3 MMOL/L (ref 3.5–5.1)
PROT SERPL-MCNC: 7 G/DL (ref 6.4–8.2)
SODIUM SERPL-SCNC: 144 MMOL/L (ref 136–145)
T3FREE SERPL-MCNC: 2.5 PG/ML (ref 2.3–4.2)
T4 FREE SERPL-MCNC: 1.2 NG/DL (ref 0.9–1.8)
TRIGL SERPL-MCNC: 183 MG/DL (ref 0–150)
TSH SERPL DL<=0.005 MIU/L-ACNC: 0.74 UIU/ML (ref 0.27–4.2)
VLDLC SERPL CALC-MCNC: 37 MG/DL

## 2024-01-31 LAB
EST. AVERAGE GLUCOSE BLD GHB EST-MCNC: 203 MG/DL
HBA1C MFR BLD: 8.7 %

## 2024-02-06 ASSESSMENT — PATIENT HEALTH QUESTIONNAIRE - PHQ9
2. FEELING DOWN, DEPRESSED OR HOPELESS: 0
SUM OF ALL RESPONSES TO PHQ QUESTIONS 1-9: 0
SUM OF ALL RESPONSES TO PHQ9 QUESTIONS 1 & 2: 0
SUM OF ALL RESPONSES TO PHQ9 QUESTIONS 1 & 2: 0
1. LITTLE INTEREST OR PLEASURE IN DOING THINGS: 0
SUM OF ALL RESPONSES TO PHQ QUESTIONS 1-9: 0
SUM OF ALL RESPONSES TO PHQ QUESTIONS 1-9: 0
1. LITTLE INTEREST OR PLEASURE IN DOING THINGS: NOT AT ALL
SUM OF ALL RESPONSES TO PHQ QUESTIONS 1-9: 0
2. FEELING DOWN, DEPRESSED OR HOPELESS: NOT AT ALL

## 2024-02-07 ENCOUNTER — OFFICE VISIT (OUTPATIENT)
Dept: INTERNAL MEDICINE CLINIC | Age: 74
End: 2024-02-07
Payer: MEDICARE

## 2024-02-07 VITALS
BODY MASS INDEX: 34.14 KG/M2 | SYSTOLIC BLOOD PRESSURE: 94 MMHG | HEART RATE: 64 BPM | WEIGHT: 244.8 LBS | OXYGEN SATURATION: 96 % | DIASTOLIC BLOOD PRESSURE: 60 MMHG

## 2024-02-07 DIAGNOSIS — E11.42 DIABETIC POLYNEUROPATHY ASSOCIATED WITH TYPE 2 DIABETES MELLITUS (HCC): ICD-10-CM

## 2024-02-07 DIAGNOSIS — E29.1 TESTOSTERONE DEFICIENCY IN MALE: ICD-10-CM

## 2024-02-07 DIAGNOSIS — Z12.5 SCREENING PSA (PROSTATE SPECIFIC ANTIGEN): ICD-10-CM

## 2024-02-07 DIAGNOSIS — Z00.00 ROUTINE MEDICAL EXAM: Primary | ICD-10-CM

## 2024-02-07 DIAGNOSIS — I10 ESSENTIAL HYPERTENSION: ICD-10-CM

## 2024-02-07 DIAGNOSIS — E29.1 PRIMARY MALE HYPOGONADISM: ICD-10-CM

## 2024-02-07 LAB
DEPRECATED RDW RBC AUTO: 15.2 % (ref 12.4–15.4)
HCT VFR BLD AUTO: 43.5 % (ref 40.5–52.5)
HGB BLD-MCNC: 14.4 G/DL (ref 13.5–17.5)
MCH RBC QN AUTO: 29.2 PG (ref 26–34)
MCHC RBC AUTO-ENTMCNC: 33.2 G/DL (ref 31–36)
MCV RBC AUTO: 88.1 FL (ref 80–100)
PLATELET # BLD AUTO: 287 K/UL (ref 135–450)
PLATELET BLD QL SMEAR: NORMAL
PMV BLD AUTO: 10.1 FL (ref 5–10.5)
PSA SERPL DL<=0.01 NG/ML-MCNC: 3.16 NG/ML (ref 0–4)
RBC # BLD AUTO: 4.94 M/UL (ref 4.2–5.9)
WBC # BLD AUTO: 8.1 K/UL (ref 4–11)

## 2024-02-07 PROCEDURE — 3078F DIAST BP <80 MM HG: CPT | Performed by: INTERNAL MEDICINE

## 2024-02-07 PROCEDURE — 3074F SYST BP LT 130 MM HG: CPT | Performed by: INTERNAL MEDICINE

## 2024-02-07 PROCEDURE — 1123F ACP DISCUSS/DSCN MKR DOCD: CPT | Performed by: INTERNAL MEDICINE

## 2024-02-07 PROCEDURE — 3052F HG A1C>EQUAL 8.0%<EQUAL 9.0%: CPT | Performed by: INTERNAL MEDICINE

## 2024-02-07 PROCEDURE — 99214 OFFICE O/P EST MOD 30 MIN: CPT | Performed by: INTERNAL MEDICINE

## 2024-02-07 RX ORDER — AMLODIPINE BESYLATE 5 MG/1
2.5 TABLET ORAL DAILY
Qty: 90 TABLET | Refills: 1 | Status: SHIPPED | OUTPATIENT
Start: 2024-02-07

## 2024-02-07 ASSESSMENT — ENCOUNTER SYMPTOMS
VOMITING: 0
NAUSEA: 0
ABDOMINAL PAIN: 0
SHORTNESS OF BREATH: 0
WHEEZING: 0

## 2024-02-07 NOTE — PROGRESS NOTES
0.5 MG/DOSE,) 2 MG/3ML SOPN INJECT 0.5MG WEEKLY (PRESCRIBER OK IS NEEDED FOR NEXT FILL) 3 mL 5    empagliflozin (JARDIANCE) 25 MG tablet Take 1 tablet by mouth daily 90 tablet 1    Insulin Pen Needle (COMFORT EZ PEN NEEDLES) 31G X 5 MM MISC USE 6 TIMES PER DAY OR AS DIRECTED FOR LANTUS - HUMALOG **MAY REFILL** 100 each 11    levothyroxine (SYNTHROID) 100 MCG tablet 1 TABLET BY MOUTH ONCE DAILY **NO REFILLS-PRESCRIBER OK 90 tablet 1    Continuous Blood Gluc Sensor (FREESTYLE ALBA 2 SENSOR) MISC USE 1 SENSOR EVERY 14 DAYS (PRESCRIBER OK IS NEEDED FOR NEXT FILL) 6 each 1    insulin lispro (HUMALOG KWIKPEN) 200 UNIT/ML SOPN pen INJECT 25 UNITS BEFORE MEALS AS DIRECTED PLUS CORRECTION 2 UNITS FOR EVERY 50 OF BLOOD GLUCOSE ABOVE 150 (PRESCRIBER OK IS NEEDED FOR NEXT FILL), total daily dose 90 units 54 mL 3    insulin glargine (LANTUS SOLOSTAR) 100 UNIT/ML injection pen INJECT 50 UNITS SUBCUTANEOUSLY 2 TIMES A DAY (PRESCRIBER OK IS NEEDED FOR NEXT FILL) 90 mL 3    benazepril (LOTENSIN) 20 MG tablet 1 TABLET BY MOUTH ONCE DAY (PRESCRIBER OK IS NEEDED FOR NEXT REFILL) 90 tablet 3    vitamin D (ERGOCALCIFEROL) 1.25 MG (54429 UT) CAPS capsule 1 CAPSULE EVERY WEEK 12 capsule 3    omega-3 acid ethyl esters (LOVAZA) 1 g capsule Take 2 capsules by mouth 2 times daily 360 capsule 1    Continuous Blood Gluc  (FREESTYLE ALBA 2 READER) DELMIS Use as directed by MD 1 each 0    Continuous Blood Gluc  (FREESTYLE ALBA READER) DELMIS 1 Units by Does not apply route as needed (as needed) 1 Device 0    Turmeric 500 MG CAPS Take 1 capsule by mouth daily      UNABLE TO FIND CPAP supplies, water reservoir and tubing. Dx 327.23 1 each 0    aspirin EC 81 MG EC tablet Take 1 tablet by mouth daily 30 tablet 11    Multiple Vitamin (MULTIVITAMIN PO) Take  by mouth daily.       No current facility-administered medications for this visit.       Return in about 6 months (around 8/7/2024) for AWV.  An After Visit Summary was printed and

## 2024-02-09 LAB
SHBG SERPL-SCNC: 38 NMOL/L (ref 11–80)
TESTOST FREE SERPL-MCNC: 31.7 PG/ML (ref 47–244)
TESTOST SERPL-MCNC: 183 NG/DL (ref 220–1000)

## 2024-02-12 DIAGNOSIS — E29.1 PRIMARY MALE HYPOGONADISM: Primary | Chronic | ICD-10-CM

## 2024-02-12 NOTE — RESULT ENCOUNTER NOTE
Discussed with patient.  Usually take shower 3 hours after putting testosterone.  No symptoms concerning for hypogonadism at this time  We will repeat testosterone  Order in epic

## 2024-03-20 ENCOUNTER — OFFICE VISIT (OUTPATIENT)
Dept: ENDOCRINOLOGY | Age: 74
End: 2024-03-20
Payer: MEDICARE

## 2024-03-20 VITALS
BODY MASS INDEX: 34.86 KG/M2 | SYSTOLIC BLOOD PRESSURE: 129 MMHG | TEMPERATURE: 98 F | RESPIRATION RATE: 14 BRPM | WEIGHT: 249 LBS | OXYGEN SATURATION: 94 % | DIASTOLIC BLOOD PRESSURE: 68 MMHG | HEIGHT: 71 IN | HEART RATE: 87 BPM

## 2024-03-20 DIAGNOSIS — E03.9 ACQUIRED HYPOTHYROIDISM: ICD-10-CM

## 2024-03-20 DIAGNOSIS — R79.89 ELEVATED SERUM CREATININE: ICD-10-CM

## 2024-03-20 DIAGNOSIS — I10 ESSENTIAL HYPERTENSION: ICD-10-CM

## 2024-03-20 DIAGNOSIS — E78.2 MIXED HYPERLIPIDEMIA: ICD-10-CM

## 2024-03-20 DIAGNOSIS — E11.65 POORLY CONTROLLED TYPE 2 DIABETES MELLITUS WITH NEUROPATHY (HCC): Primary | ICD-10-CM

## 2024-03-20 DIAGNOSIS — E11.40 POORLY CONTROLLED TYPE 2 DIABETES MELLITUS WITH NEUROPATHY (HCC): ICD-10-CM

## 2024-03-20 DIAGNOSIS — E66.9 CLASS 1 OBESITY WITH SERIOUS COMORBIDITY AND BODY MASS INDEX (BMI) OF 34.0 TO 34.9 IN ADULT, UNSPECIFIED OBESITY TYPE: ICD-10-CM

## 2024-03-20 DIAGNOSIS — E11.65 POORLY CONTROLLED TYPE 2 DIABETES MELLITUS WITH NEUROPATHY (HCC): ICD-10-CM

## 2024-03-20 DIAGNOSIS — E55.9 VITAMIN D DEFICIENCY: ICD-10-CM

## 2024-03-20 DIAGNOSIS — E11.40 POORLY CONTROLLED TYPE 2 DIABETES MELLITUS WITH NEUROPATHY (HCC): Primary | ICD-10-CM

## 2024-03-20 LAB
25(OH)D3 SERPL-MCNC: 65.5 NG/ML
ALBUMIN SERPL-MCNC: 4.6 G/DL (ref 3.4–5)
ALBUMIN/GLOB SERPL: 2 {RATIO} (ref 1.1–2.2)
ALP SERPL-CCNC: 112 U/L (ref 40–129)
ALT SERPL-CCNC: 32 U/L (ref 10–40)
ANION GAP SERPL CALCULATED.3IONS-SCNC: 13 MMOL/L (ref 3–16)
AST SERPL-CCNC: 27 U/L (ref 15–37)
BILIRUB SERPL-MCNC: 0.4 MG/DL (ref 0–1)
BUN SERPL-MCNC: 16 MG/DL (ref 7–20)
CALCIUM SERPL-MCNC: 9.7 MG/DL (ref 8.3–10.6)
CHLORIDE SERPL-SCNC: 104 MMOL/L (ref 99–110)
CHOLEST SERPL-MCNC: 121 MG/DL (ref 0–199)
CO2 SERPL-SCNC: 26 MMOL/L (ref 21–32)
CREAT SERPL-MCNC: 1.1 MG/DL (ref 0.8–1.3)
GFR SERPLBLD CREATININE-BSD FMLA CKD-EPI: >60 ML/MIN/{1.73_M2}
GLUCOSE SERPL-MCNC: 131 MG/DL (ref 70–99)
HDLC SERPL-MCNC: 31 MG/DL (ref 40–60)
LDL CHOLESTEROL CALCULATED: 44 MG/DL
POTASSIUM SERPL-SCNC: 4.6 MMOL/L (ref 3.5–5.1)
PROT SERPL-MCNC: 6.9 G/DL (ref 6.4–8.2)
SODIUM SERPL-SCNC: 143 MMOL/L (ref 136–145)
T3FREE SERPL-MCNC: 2.8 PG/ML (ref 2.3–4.2)
T4 FREE SERPL-MCNC: 1.3 NG/DL (ref 0.9–1.8)
TRIGL SERPL-MCNC: 232 MG/DL (ref 0–150)
TSH SERPL DL<=0.005 MIU/L-ACNC: 0.77 UIU/ML (ref 0.27–4.2)
VLDLC SERPL CALC-MCNC: 46 MG/DL

## 2024-03-20 PROCEDURE — 99214 OFFICE O/P EST MOD 30 MIN: CPT | Performed by: INTERNAL MEDICINE

## 2024-03-20 PROCEDURE — 1123F ACP DISCUSS/DSCN MKR DOCD: CPT | Performed by: INTERNAL MEDICINE

## 2024-03-20 PROCEDURE — 3078F DIAST BP <80 MM HG: CPT | Performed by: INTERNAL MEDICINE

## 2024-03-20 PROCEDURE — 3052F HG A1C>EQUAL 8.0%<EQUAL 9.0%: CPT | Performed by: INTERNAL MEDICINE

## 2024-03-20 PROCEDURE — 3074F SYST BP LT 130 MM HG: CPT | Performed by: INTERNAL MEDICINE

## 2024-03-20 PROCEDURE — 95251 CONT GLUC MNTR ANALYSIS I&R: CPT | Performed by: INTERNAL MEDICINE

## 2024-03-20 RX ORDER — LEVOTHYROXINE SODIUM 0.1 MG/1
TABLET ORAL
Qty: 90 TABLET | Refills: 1 | Status: SHIPPED | OUTPATIENT
Start: 2024-03-20

## 2024-03-20 RX ORDER — BLOOD-GLUCOSE SENSOR
EACH MISCELLANEOUS
Qty: 6 EACH | Refills: 1 | Status: SHIPPED | OUTPATIENT
Start: 2024-03-20

## 2024-03-20 RX ORDER — SEMAGLUTIDE 0.68 MG/ML
INJECTION, SOLUTION SUBCUTANEOUS
Qty: 3 ML | Refills: 5 | Status: CANCELLED | OUTPATIENT
Start: 2024-03-20

## 2024-03-20 NOTE — PROGRESS NOTES
swallowing, no neck lumps, no dental problems, no mouth sores, no ringing in ears  Pulmonary: no shortness of breath, no wheezing, no dyspnea on exertion, no cough  Cardiovascular: no chest pain, no lower extremity edema, no orthopnea, no intermittent leg claudication, no palpitations  Gastrointestinal: no abdominal pain, no nausea, no vomiting, no diarrhea, no constipation, no dysphagia, no heartburn, no bloating  Genitourinary: no dysuria, no urinary incontinence, no urinary hesitancy, no urinary frequency, no feelings of urinary urgency, no nocturia  Musculoskeletal: no joint swelling, no joint stiffness, no joint pain, no muscle cramps, no muscle pain, no bone pain, no fractures  Integument/Breast:  no skin rashes, no skin lesions, no itching, no dry skin  Neurological: Has numbness, has tingling, no weakness, no confusion, no headaches, no dizziness, no fainting, no tremors, no decrease in memory, no balance problems  Psychiatric: no anxiety, no depression, no insomnia  Hematologic/Lymphatic: no tendency for easy bleeding, no swollen lymph nodes, no tendency for easy bruising  Immunology: has seasonal allergies, no frequent infections, no frequent illnesses  Endocrine: no temperature intolerance     OBJECTIVE:  /68   Pulse 87   Temp 98 °F (36.7 °C)   Resp 14   Ht 1.803 m (5' 10.98\")   Wt 112.9 kg (249 lb)   SpO2 94%   BMI 34.74 kg/m²      Wt Readings from Last 3 Encounters:   03/20/24 112.9 kg (249 lb)   02/07/24 111 kg (244 lb 12.8 oz)   09/06/23 111.1 kg (245 lb)       Constitutional: no acute distress, well appearing and well nourished  Psychiatric: oriented to person, place and time, judgement and insight and normal, recent and remote memory and intact and mood and affect are normal  Skin: skin and subcutaneous tissue is normal without mass, normal turgor  Head and Face: examination of head and face revealed no abnormalities  Eyes: no lid or conjunctival swelling, erythema or discharge, pupils

## 2024-03-21 LAB
EST. AVERAGE GLUCOSE BLD GHB EST-MCNC: 203 MG/DL
HBA1C MFR BLD: 8.7 %

## 2024-04-08 DIAGNOSIS — E29.1 TESTOSTERONE DEFICIENCY IN MALE: ICD-10-CM

## 2024-04-09 RX ORDER — TESTOSTERONE 16.2 MG/G
2 GEL TRANSDERMAL DAILY
Qty: 75 G | Refills: 2 | Status: SHIPPED | OUTPATIENT
Start: 2024-04-09 | End: 2024-07-08

## 2024-04-15 DIAGNOSIS — M1A.9XX0 CHRONIC GOUT WITHOUT TOPHUS, UNSPECIFIED CAUSE, UNSPECIFIED SITE: ICD-10-CM

## 2024-04-15 DIAGNOSIS — I10 ESSENTIAL HYPERTENSION: ICD-10-CM

## 2024-04-15 NOTE — TELEPHONE ENCOUNTER
Medication:   Requested Prescriptions     Pending Prescriptions Disp Refills    potassium citrate (UROCIT-K) 10 MEQ (1080 MG) extended release tablet [Pharmacy Med Name: POTASSIUM CITRATE ER 10 MEQ 10 MEQ Tablet] 90 tablet 0     Si TABLET ONCE DAILY (PRESCRIBER OK IS NEEDED FOR NEXT FILL)    allopurinol (ZYLOPRIM) 300 MG tablet [Pharmacy Med Name: ALLOPURINOL 300MG TAB ACCOR 300 Tablet] 90 tablet 0     Sig: TAKE 1 TABLET ONCE DAILY (PRESCRIBER OK IS NEEDED FOR NEXT FILL)        Last Filled:  24    Patient Phone Number: [phone] (home)     Last appt: 2024   Next appt: 2024    Last OARRS:       3/29/2023     2:42 PM   RX Monitoring   Periodic Controlled Substance Monitoring No signs of potential drug abuse or diversion identified.

## 2024-04-16 RX ORDER — POTASSIUM CITRATE 10 MEQ/1
10 TABLET, EXTENDED RELEASE ORAL DAILY
Qty: 90 TABLET | Refills: 1 | Status: SHIPPED | OUTPATIENT
Start: 2024-04-16

## 2024-04-16 RX ORDER — ALLOPURINOL 300 MG/1
TABLET ORAL
Qty: 90 TABLET | Refills: 1 | Status: SHIPPED | OUTPATIENT
Start: 2024-04-16

## 2024-06-06 DIAGNOSIS — E11.42 DIABETIC POLYNEUROPATHY ASSOCIATED WITH TYPE 2 DIABETES MELLITUS (HCC): ICD-10-CM

## 2024-06-06 RX ORDER — OMEGA-3-ACID ETHYL ESTERS 1 G/1
2 CAPSULE, LIQUID FILLED ORAL 2 TIMES DAILY
Qty: 360 CAPSULE | Refills: 1 | Status: SHIPPED | OUTPATIENT
Start: 2024-06-06

## 2024-06-06 NOTE — TELEPHONE ENCOUNTER
Medication:   Requested Prescriptions     Pending Prescriptions Disp Refills    omega-3 acid ethyl esters (LOVAZA) 1 g capsule [Pharmacy Med Name: OMEGA-3-ACID ETHYL ESTERS 1 1 Capsule] 360 capsule 1     Si CAPSULES 2 TIMES A DAY **MAY REFILL**        Last Filled:  23    Patient Phone Number: [phone] (home)     Last appt: 2024   Next appt: 2024    Last OARRS:       3/29/2023     2:42 PM   RX Monitoring   Periodic Controlled Substance Monitoring No signs of potential drug abuse or diversion identified.

## 2024-06-18 DIAGNOSIS — E78.2 MIXED HYPERLIPIDEMIA: ICD-10-CM

## 2024-06-18 RX ORDER — EMPAGLIFLOZIN 25 MG/1
TABLET, FILM COATED ORAL
Qty: 90 TABLET | Refills: 1 | Status: SHIPPED | OUTPATIENT
Start: 2024-06-18

## 2024-06-18 RX ORDER — ATORVASTATIN CALCIUM 20 MG/1
TABLET, FILM COATED ORAL
Qty: 90 TABLET | Refills: 1 | Status: SHIPPED | OUTPATIENT
Start: 2024-06-18

## 2024-07-26 DIAGNOSIS — I10 ESSENTIAL HYPERTENSION: ICD-10-CM

## 2024-07-26 DIAGNOSIS — E29.1 TESTOSTERONE DEFICIENCY IN MALE: ICD-10-CM

## 2024-07-26 RX ORDER — BENAZEPRIL HYDROCHLORIDE 20 MG/1
20 TABLET ORAL DAILY
Qty: 90 TABLET | Refills: 1 | Status: SHIPPED | OUTPATIENT
Start: 2024-07-26

## 2024-07-26 RX ORDER — PEN NEEDLE, DIABETIC 31 GX5/16"
NEEDLE, DISPOSABLE MISCELLANEOUS
Qty: 200 EACH | Refills: 5 | Status: SHIPPED | OUTPATIENT
Start: 2024-07-26

## 2024-07-26 RX ORDER — TESTOSTERONE 16.2 MG/G
GEL TRANSDERMAL
Qty: 75 G | Refills: 2 | Status: SHIPPED | OUTPATIENT
Start: 2024-07-26 | End: 2024-10-24

## 2024-07-26 RX ORDER — AMLODIPINE BESYLATE 5 MG/1
5 TABLET ORAL DAILY
Qty: 90 TABLET | Refills: 1 | Status: SHIPPED | OUTPATIENT
Start: 2024-07-26

## 2024-07-26 NOTE — TELEPHONE ENCOUNTER
PDMP Monitoring:    Last PDMP Mario as Reviewed:  Review User Review Instant Review Result   MD CLAUDIO STUART 7/26/2024 11:07 AM Reviewed PDMP [1]     [unfilled]  Urine Drug Screenings (1 yr)    No resulted procedures found.       Medication Contract and Consent for Opioid Use Documents Filed        No documents found

## 2024-07-29 DIAGNOSIS — E03.9 ACQUIRED HYPOTHYROIDISM: ICD-10-CM

## 2024-07-29 DIAGNOSIS — R79.89 ELEVATED SERUM CREATININE: ICD-10-CM

## 2024-07-29 DIAGNOSIS — E11.40 POORLY CONTROLLED TYPE 2 DIABETES MELLITUS WITH NEUROPATHY (HCC): ICD-10-CM

## 2024-07-29 DIAGNOSIS — E78.2 MIXED HYPERLIPIDEMIA: ICD-10-CM

## 2024-07-29 DIAGNOSIS — I10 ESSENTIAL HYPERTENSION: ICD-10-CM

## 2024-07-29 DIAGNOSIS — E55.9 VITAMIN D DEFICIENCY: ICD-10-CM

## 2024-07-29 DIAGNOSIS — E11.65 POORLY CONTROLLED TYPE 2 DIABETES MELLITUS WITH NEUROPATHY (HCC): ICD-10-CM

## 2024-07-29 LAB
25(OH)D3 SERPL-MCNC: 71.5 NG/ML
ALBUMIN SERPL-MCNC: 4.3 G/DL (ref 3.4–5)
ALBUMIN/GLOB SERPL: 1.7 {RATIO} (ref 1.1–2.2)
ALP SERPL-CCNC: 131 U/L (ref 40–129)
ALT SERPL-CCNC: 33 U/L (ref 10–40)
ANION GAP SERPL CALCULATED.3IONS-SCNC: 15 MMOL/L (ref 3–16)
AST SERPL-CCNC: 27 U/L (ref 15–37)
BILIRUB SERPL-MCNC: 0.5 MG/DL (ref 0–1)
BUN SERPL-MCNC: 20 MG/DL (ref 7–20)
CALCIUM SERPL-MCNC: 9.5 MG/DL (ref 8.3–10.6)
CHLORIDE SERPL-SCNC: 102 MMOL/L (ref 99–110)
CHOLEST SERPL-MCNC: 126 MG/DL (ref 0–199)
CO2 SERPL-SCNC: 23 MMOL/L (ref 21–32)
CREAT SERPL-MCNC: 1.1 MG/DL (ref 0.8–1.3)
CREAT UR-MCNC: 72.3 MG/DL (ref 39–259)
GFR SERPLBLD CREATININE-BSD FMLA CKD-EPI: 70 ML/MIN/{1.73_M2}
GLUCOSE SERPL-MCNC: 186 MG/DL (ref 70–99)
HDLC SERPL-MCNC: 29 MG/DL (ref 40–60)
LDL CHOLESTEROL: 49 MG/DL
MICROALBUMIN UR DL<=1MG/L-MCNC: <1.2 MG/DL
MICROALBUMIN/CREAT UR: NORMAL MG/G (ref 0–30)
POTASSIUM SERPL-SCNC: 4.8 MMOL/L (ref 3.5–5.1)
PROT SERPL-MCNC: 6.9 G/DL (ref 6.4–8.2)
SODIUM SERPL-SCNC: 140 MMOL/L (ref 136–145)
T4 FREE SERPL-MCNC: 1.3 NG/DL (ref 0.9–1.8)
TRIGL SERPL-MCNC: 240 MG/DL (ref 0–150)
TSH SERPL DL<=0.005 MIU/L-ACNC: 0.54 UIU/ML (ref 0.27–4.2)
VLDLC SERPL CALC-MCNC: 48 MG/DL

## 2024-07-30 LAB
EST. AVERAGE GLUCOSE BLD GHB EST-MCNC: 188.6 MG/DL
HBA1C MFR BLD: 8.2 %

## 2024-07-31 ENCOUNTER — OFFICE VISIT (OUTPATIENT)
Dept: ENDOCRINOLOGY | Age: 74
End: 2024-07-31

## 2024-07-31 VITALS
HEIGHT: 71 IN | BODY MASS INDEX: 34.16 KG/M2 | HEART RATE: 80 BPM | RESPIRATION RATE: 14 BRPM | DIASTOLIC BLOOD PRESSURE: 56 MMHG | WEIGHT: 244 LBS | TEMPERATURE: 98 F | SYSTOLIC BLOOD PRESSURE: 121 MMHG | OXYGEN SATURATION: 98 %

## 2024-07-31 DIAGNOSIS — R79.89 ELEVATED SERUM CREATININE: ICD-10-CM

## 2024-07-31 DIAGNOSIS — E66.9 CLASS 1 OBESITY WITH SERIOUS COMORBIDITY AND BODY MASS INDEX (BMI) OF 34.0 TO 34.9 IN ADULT, UNSPECIFIED OBESITY TYPE: ICD-10-CM

## 2024-07-31 DIAGNOSIS — E11.40 POORLY CONTROLLED TYPE 2 DIABETES MELLITUS WITH NEUROPATHY (HCC): Primary | ICD-10-CM

## 2024-07-31 DIAGNOSIS — E78.2 MIXED HYPERLIPIDEMIA: ICD-10-CM

## 2024-07-31 DIAGNOSIS — E11.65 POORLY CONTROLLED TYPE 2 DIABETES MELLITUS WITH NEUROPATHY (HCC): Primary | ICD-10-CM

## 2024-07-31 DIAGNOSIS — I10 ESSENTIAL HYPERTENSION: ICD-10-CM

## 2024-07-31 DIAGNOSIS — E03.9 ACQUIRED HYPOTHYROIDISM: ICD-10-CM

## 2024-07-31 DIAGNOSIS — E55.9 VITAMIN D DEFICIENCY: ICD-10-CM

## 2024-07-31 RX ORDER — SEMAGLUTIDE 2.68 MG/ML
2 INJECTION, SOLUTION SUBCUTANEOUS
Qty: 3 ML | Refills: 3 | Status: SHIPPED | OUTPATIENT
Start: 2024-07-31

## 2024-07-31 NOTE — PROGRESS NOTES
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    Current symptoms/problems include  hyperglycemia  and show no change.     1.  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     Comorbid conditions:  hyperlipidemia, HTN and Neuropathy    Current diabetic medications include: Ozempic, Humalog 25 units 3 times daily, Lantus 50 mg twice daily, Jardiance 25 mg daily    Intolerance to diabetes medications: Metformin upset GI     Weight trend: stable  Prior visit with dietician: yes  Current diet: on average, 3 meals per day  Current exercise: Golf twice weekly, with walks daily, swimming twice a week     Current monitoring regimen: home blood tests - 4 times daily  Has brought blood glucose log/meter:  Yes  Home blood sugar records: fasting range: 105 - 130 and postprandial range:  130-140  Any episodes of hypoglycemia? None   Hypoglycemia frequency and time(s):  1-2 episodes per year   Does patient have Glucagon emergency kit? No  Does patient have rapid acting carbohydrate?  Yes  Does patient wear a medic alert bracelet or necklace?  No    2. Acquired hypothyroidism  Has fatigue.    3. Essential hypertension  No headaches.    4. Mixed hyperlipidemia  No muscle pain.    5. Vitamin D deficiency  Has fatigue.    6. Obesity  On diet, exercise    7.  Elevated creatinine  No urination problems      THYROID ULTRASOUND-       INDICATION- Type 2 diabetes, history of hypothyroidism. Patient is   status post left thyroid lobectomy.       FINDINGS-        The right thyroid lobe appears normal, measuring 4.9 x 1.4 x 1.6   cm. Thyroid parenchyma is mildly heterogeneous but no focal lesion   is detected. The left thyroid lobe is not visualized.       IMPRESSION-        1. Mild heterogeneity of the right thyroid lobe with no focal   lesion detected.       2. Status post left thyroid lobectomy.            Read By-

## 2024-08-04 SDOH — ECONOMIC STABILITY: FOOD INSECURITY: WITHIN THE PAST 12 MONTHS, THE FOOD YOU BOUGHT JUST DIDN'T LAST AND YOU DIDN'T HAVE MONEY TO GET MORE.: NEVER TRUE

## 2024-08-04 SDOH — HEALTH STABILITY: PHYSICAL HEALTH: ON AVERAGE, HOW MANY DAYS PER WEEK DO YOU ENGAGE IN MODERATE TO STRENUOUS EXERCISE (LIKE A BRISK WALK)?: 5 DAYS

## 2024-08-04 SDOH — ECONOMIC STABILITY: FOOD INSECURITY: WITHIN THE PAST 12 MONTHS, YOU WORRIED THAT YOUR FOOD WOULD RUN OUT BEFORE YOU GOT MONEY TO BUY MORE.: NEVER TRUE

## 2024-08-04 SDOH — ECONOMIC STABILITY: INCOME INSECURITY: HOW HARD IS IT FOR YOU TO PAY FOR THE VERY BASICS LIKE FOOD, HOUSING, MEDICAL CARE, AND HEATING?: NOT HARD AT ALL

## 2024-08-04 SDOH — HEALTH STABILITY: PHYSICAL HEALTH: ON AVERAGE, HOW MANY MINUTES DO YOU ENGAGE IN EXERCISE AT THIS LEVEL?: 60 MIN

## 2024-08-04 ASSESSMENT — LIFESTYLE VARIABLES
HAS A RELATIVE, FRIEND, DOCTOR, OR ANOTHER HEALTH PROFESSIONAL EXPRESSED CONCERN ABOUT YOUR DRINKING OR SUGGESTED YOU CUT DOWN: NO
HOW OFTEN DO YOU HAVE SIX OR MORE DRINKS ON ONE OCCASION: 1
HAS A RELATIVE, FRIEND, DOCTOR, OR ANOTHER HEALTH PROFESSIONAL EXPRESSED CONCERN ABOUT YOUR DRINKING OR SUGGESTED YOU CUT DOWN: NO
HOW OFTEN DURING THE LAST YEAR HAVE YOU FOUND THAT YOU WERE NOT ABLE TO STOP DRINKING ONCE YOU HAD STARTED: NEVER
HOW OFTEN DURING THE LAST YEAR HAVE YOU FAILED TO DO WHAT WAS NORMALLY EXPECTED FROM YOU BECAUSE OF DRINKING: NEVER
HOW OFTEN DURING THE LAST YEAR HAVE YOU HAD A FEELING OF GUILT OR REMORSE AFTER DRINKING: NEVER
HOW OFTEN DURING THE LAST YEAR HAVE YOU FAILED TO DO WHAT WAS NORMALLY EXPECTED FROM YOU BECAUSE OF DRINKING: NEVER
HOW OFTEN DO YOU HAVE A DRINK CONTAINING ALCOHOL: 5
HOW OFTEN DURING THE LAST YEAR HAVE YOU BEEN UNABLE TO REMEMBER WHAT HAPPENED THE NIGHT BEFORE BECAUSE YOU HAD BEEN DRINKING: NEVER
HOW OFTEN DURING THE LAST YEAR HAVE YOU NEEDED AN ALCOHOLIC DRINK FIRST THING IN THE MORNING TO GET YOURSELF GOING AFTER A NIGHT OF HEAVY DRINKING: NEVER
HOW OFTEN DURING THE LAST YEAR HAVE YOU FOUND THAT YOU WERE NOT ABLE TO STOP DRINKING ONCE YOU HAD STARTED: NEVER
HAVE YOU OR SOMEONE ELSE BEEN INJURED AS A RESULT OF YOUR DRINKING: NO
HOW OFTEN DURING THE LAST YEAR HAVE YOU BEEN UNABLE TO REMEMBER WHAT HAPPENED THE NIGHT BEFORE BECAUSE YOU HAD BEEN DRINKING: NEVER
HOW OFTEN DURING THE LAST YEAR HAVE YOU HAD A FEELING OF GUILT OR REMORSE AFTER DRINKING: NEVER
HOW MANY STANDARD DRINKS CONTAINING ALCOHOL DO YOU HAVE ON A TYPICAL DAY: 1 OR 2
HOW OFTEN DO YOU HAVE A DRINK CONTAINING ALCOHOL: 4 OR MORE TIMES A WEEK
HOW MANY STANDARD DRINKS CONTAINING ALCOHOL DO YOU HAVE ON A TYPICAL DAY: 1
HOW OFTEN DURING THE LAST YEAR HAVE YOU NEEDED AN ALCOHOLIC DRINK FIRST THING IN THE MORNING TO GET YOURSELF GOING AFTER A NIGHT OF HEAVY DRINKING: NEVER
HAVE YOU OR SOMEONE ELSE BEEN INJURED AS A RESULT OF YOUR DRINKING: NO

## 2024-08-04 ASSESSMENT — PATIENT HEALTH QUESTIONNAIRE - PHQ9
SUM OF ALL RESPONSES TO PHQ QUESTIONS 1-9: 0
2. FEELING DOWN, DEPRESSED OR HOPELESS: NOT AT ALL
SUM OF ALL RESPONSES TO PHQ QUESTIONS 1-9: 0
1. LITTLE INTEREST OR PLEASURE IN DOING THINGS: NOT AT ALL
SUM OF ALL RESPONSES TO PHQ9 QUESTIONS 1 & 2: 0
SUM OF ALL RESPONSES TO PHQ QUESTIONS 1-9: 0
SUM OF ALL RESPONSES TO PHQ QUESTIONS 1-9: 0

## 2024-08-07 ENCOUNTER — OFFICE VISIT (OUTPATIENT)
Dept: INTERNAL MEDICINE CLINIC | Age: 74
End: 2024-08-07
Payer: MEDICARE

## 2024-08-07 VITALS
HEART RATE: 69 BPM | DIASTOLIC BLOOD PRESSURE: 60 MMHG | SYSTOLIC BLOOD PRESSURE: 106 MMHG | WEIGHT: 241.4 LBS | BODY MASS INDEX: 33.8 KG/M2 | HEIGHT: 71 IN | OXYGEN SATURATION: 97 %

## 2024-08-07 DIAGNOSIS — E11.42 DIABETIC POLYNEUROPATHY ASSOCIATED WITH TYPE 2 DIABETES MELLITUS (HCC): ICD-10-CM

## 2024-08-07 DIAGNOSIS — G47.33 OSA (OBSTRUCTIVE SLEEP APNEA): ICD-10-CM

## 2024-08-07 DIAGNOSIS — R74.8 ELEVATED ALKALINE PHOSPHATASE LEVEL: ICD-10-CM

## 2024-08-07 DIAGNOSIS — Z00.00 MEDICARE ANNUAL WELLNESS VISIT, SUBSEQUENT: Primary | ICD-10-CM

## 2024-08-07 DIAGNOSIS — I10 ESSENTIAL HYPERTENSION: ICD-10-CM

## 2024-08-07 DIAGNOSIS — R01.1 HEART MURMUR: ICD-10-CM

## 2024-08-07 DIAGNOSIS — M1A.9XX0 CHRONIC GOUT WITHOUT TOPHUS, UNSPECIFIED CAUSE, UNSPECIFIED SITE: ICD-10-CM

## 2024-08-07 PROCEDURE — 3074F SYST BP LT 130 MM HG: CPT | Performed by: INTERNAL MEDICINE

## 2024-08-07 PROCEDURE — 99214 OFFICE O/P EST MOD 30 MIN: CPT | Performed by: INTERNAL MEDICINE

## 2024-08-07 PROCEDURE — 3078F DIAST BP <80 MM HG: CPT | Performed by: INTERNAL MEDICINE

## 2024-08-07 PROCEDURE — 3052F HG A1C>EQUAL 8.0%<EQUAL 9.0%: CPT | Performed by: INTERNAL MEDICINE

## 2024-08-07 PROCEDURE — G0439 PPPS, SUBSEQ VISIT: HCPCS | Performed by: INTERNAL MEDICINE

## 2024-08-07 PROCEDURE — 1123F ACP DISCUSS/DSCN MKR DOCD: CPT | Performed by: INTERNAL MEDICINE

## 2024-08-07 NOTE — PATIENT INSTRUCTIONS
device in the bathroom with you.   Where can you learn more?  Go to https://www.Figure 1.net/patientEd and enter G117 to learn more about \"Preventing Falls: Care Instructions.\"  Current as of: July 17, 2023  Content Version: 14.1  © 2694-6798 [employer].   Care instructions adapted under license by [employer]. If you have questions about a medical condition or this instruction, always ask your healthcare professional. [employer] disclaims any warranty or liability for your use of this information.           Learning About Vision Tests  What are vision tests?     The four most common vision tests are visual acuity tests, refraction, visual field tests, and color vision tests.  Visual acuity (sharpness) tests  These tests are used:  To see if you need glasses or contact lenses.  To monitor an eye problem.  To check an eye injury.  Visual acuity tests are done as part of routine exams. You may also have this test when you get your 's license or apply for some types of jobs.  Visual field tests  These tests are used:  To check for vision loss in any area of your range of vision.  To screen for certain eye diseases.  To look for nerve damage after a stroke, head injury, or other problem that could reduce blood flow to the brain.  Refraction and color tests  A refraction test is done to find the right prescription for glasses and contact lenses.  A color vision test is done to check for color blindness.  Color vision is often tested as part of a routine exam. You may also have this test when you apply for a job where recognizing different colors is important, such as , electronics, or the .  How are vision tests done?  Visual acuity test   You cover one eye at a time.  You read aloud from a wall chart across the room.  You read aloud from a small card that you hold in your hand.  Refraction   You look into a special device.  The device puts lenses of different

## 2024-08-07 NOTE — PROGRESS NOTES
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  Patient need supply of sleep apnea machine.-     Dontrell Mondragon MD, Sleep Medicine, [facility]  Heart murmur    New onset murmur at the left lower parasternal.  Patient denies chest pain palpitation dizziness shortness of breath  -     Echo (TTE) complete (PRN contrast/bubble/strain/3D); Future  Essential hypertension  Slightly low blood pressure.  Will discontinue amlodipine.  Very minimal orthostatic symptoms  Diabetic polyneuropathy associated with type 2 diabetes mellitus (HCC)  Uncontrolled diabetes.  Patient has been following up with endocrinologist  Chronic gout without tophus, unspecified cause, unspecified site  No breakthrough gout.  Currently on allopurinol  -     Uric Acid; Future  Elevated alkaline phosphatase level  Patient did have recent fall and injury to the right lateral knee and right elbow.  No bony tenderness.  He is advised to repeat alkaline phosphatase in 3 months  -     Alkaline Phosphatase; Future  History of hypogonadism.  Patient continues to take testosterone gel.  Symptom has been stable  Recommendations for Preventive Services Due: see orders and patient instructions/AVS.  Recommended screening schedule for the next 5-10 years is provided to the patient in written form: see Patient Instructions/AVS.     Return in about 6 months (around 2/7/2025).     Subjective   The following acute and/or chronic problems were also addressed today:  Chronic medical condition including diabetes mellitus, hypertension  Recently Ozempic dose was increased by endocrinologist.  His hemoglobin A1c is still not within the expected range    Patient's complete Health Risk Assessment and

## 2024-08-12 ENCOUNTER — TELEPHONE (OUTPATIENT)
Dept: ENDOCRINOLOGY | Age: 74
End: 2024-08-12

## 2024-08-12 NOTE — TELEPHONE ENCOUNTER
Patient has been in contact with [employer] to get set up with a pump and they informed his that he needs a C Peptide and fasting glucose. This was done on 11/15/22. He did not have a fax number to send this to, can we get these results to [facility]?

## 2024-08-12 NOTE — TELEPHONE ENCOUNTER
Spoke w/ pt. Informed pt [employer] faxed us the form and requested all the supporting documentation, this did include these labs.

## 2024-08-13 RX ORDER — SUBCUTANEOUS INSULIN PUMP
EACH MISCELLANEOUS
Qty: 1 EACH | Refills: 0 | Status: SHIPPED | OUTPATIENT
Start: 2024-08-13

## 2024-08-13 RX ORDER — INSULIN INFUSION SET/CARTRIDGE
COMBINATION PACKAGE (EA) MISCELLANEOUS
Qty: 50 EACH | Refills: 0 | Status: SHIPPED | OUTPATIENT
Start: 2024-08-13

## 2024-08-13 RX ORDER — INSULIN PUMP CARTRIDGE
CARTRIDGE (EA) SUBCUTANEOUS
Qty: 50 EACH | Refills: 0 | Status: SHIPPED | OUTPATIENT
Start: 2024-08-13

## 2024-08-21 ENCOUNTER — HOSPITAL ENCOUNTER (OUTPATIENT)
Age: 74
Discharge: HOME | End: 2024-08-23
Attending: INTERNAL MEDICINE
Payer: MEDICARE

## 2024-08-21 ENCOUNTER — OFFICE VISIT (OUTPATIENT)
Dept: PULMONOLOGY | Age: 74
End: 2024-08-21

## 2024-08-21 VITALS
BODY MASS INDEX: 34.79 KG/M2 | HEIGHT: 70 IN | DIASTOLIC BLOOD PRESSURE: 82 MMHG | WEIGHT: 243 LBS | SYSTOLIC BLOOD PRESSURE: 122 MMHG

## 2024-08-21 VITALS
WEIGHT: 243 LBS | HEIGHT: 70 IN | DIASTOLIC BLOOD PRESSURE: 82 MMHG | BODY MASS INDEX: 34.79 KG/M2 | OXYGEN SATURATION: 97 % | HEART RATE: 63 BPM | SYSTOLIC BLOOD PRESSURE: 122 MMHG

## 2024-08-21 DIAGNOSIS — E66.9 OBESITY (BMI 30.0-34.9): ICD-10-CM

## 2024-08-21 DIAGNOSIS — R01.1 HEART MURMUR: ICD-10-CM

## 2024-08-21 DIAGNOSIS — I10 ESSENTIAL HYPERTENSION: Chronic | ICD-10-CM

## 2024-08-21 DIAGNOSIS — G47.33 OSA ON CPAP: Primary | ICD-10-CM

## 2024-08-21 PROBLEM — E66.811 CLASS 1 OBESITY WITH BODY MASS INDEX (BMI) OF 34.0 TO 34.9 IN ADULT: Status: RESOLVED | Noted: 2022-08-03 | Resolved: 2024-08-21

## 2024-08-21 PROBLEM — E66.01 CLASS 2 SEVERE OBESITY WITH SERIOUS COMORBIDITY AND BODY MASS INDEX (BMI) OF 35.0 TO 35.9 IN ADULT (HCC): Status: RESOLVED | Noted: 2022-11-16 | Resolved: 2024-08-21

## 2024-08-21 PROBLEM — E66.812 CLASS 2 SEVERE OBESITY WITH SERIOUS COMORBIDITY AND BODY MASS INDEX (BMI) OF 35.0 TO 35.9 IN ADULT: Status: RESOLVED | Noted: 2022-11-16 | Resolved: 2024-08-21

## 2024-08-21 LAB
ECHO AO ASC DIAM: 3.5 CM
ECHO AO ASCENDING AORTA INDEX: 1.54 CM/M2
ECHO AO ROOT DIAM: 3.2 CM
ECHO AO ROOT INDEX: 1.41 CM/M2
ECHO AV AREA PEAK VELOCITY: 1.8 CM2
ECHO AV AREA VTI: 1.8 CM2
ECHO AV AREA/BSA PEAK VELOCITY: 0.8 CM2/M2
ECHO AV AREA/BSA VTI: 0.8 CM2/M2
ECHO AV MEAN GRADIENT: 9 MMHG
ECHO AV MEAN VELOCITY: 1.4 M/S
ECHO AV PEAK GRADIENT: 15 MMHG
ECHO AV PEAK VELOCITY: 1.9 M/S
ECHO AV VELOCITY RATIO: 0.63
ECHO AV VTI: 44.3 CM
ECHO BSA: 2.33 M2
ECHO EST RA PRESSURE: 3 MMHG
ECHO LA AREA 2C: 17.5 CM2
ECHO LA AREA 4C: 13.4 CM2
ECHO LA DIAMETER INDEX: 1.63 CM/M2
ECHO LA DIAMETER: 3.7 CM
ECHO LA MAJOR AXIS: 4.5 CM
ECHO LA MINOR AXIS: 5.2 CM
ECHO LA TO AORTIC ROOT RATIO: 1.16
ECHO LA VOL BP: 43 ML (ref 18–58)
ECHO LA VOL MOD A2C: 48 ML (ref 18–58)
ECHO LA VOL MOD A4C: 33 ML (ref 18–58)
ECHO LA VOL/BSA BIPLANE: 19 ML/M2 (ref 16–34)
ECHO LA VOLUME INDEX MOD A2C: 21 ML/M2 (ref 16–34)
ECHO LA VOLUME INDEX MOD A4C: 15 ML/M2 (ref 16–34)
ECHO LV E' LATERAL VELOCITY: 7 CM/S
ECHO LV E' SEPTAL VELOCITY: 7 CM/S
ECHO LV EDV A2C: 97 ML
ECHO LV EDV A4C: 74 ML
ECHO LV EDV INDEX A4C: 33 ML/M2
ECHO LV EDV NDEX A2C: 43 ML/M2
ECHO LV EJECTION FRACTION A2C: 56 %
ECHO LV EJECTION FRACTION A4C: 80 %
ECHO LV EJECTION FRACTION BIPLANE: 69 % (ref 55–100)
ECHO LV ESV A2C: 43 ML
ECHO LV ESV A4C: 15 ML
ECHO LV ESV INDEX A2C: 19 ML/M2
ECHO LV ESV INDEX A4C: 7 ML/M2
ECHO LV FRACTIONAL SHORTENING: 26 % (ref 28–44)
ECHO LV INTERNAL DIMENSION DIASTOLE INDEX: 1.67 CM/M2
ECHO LV INTERNAL DIMENSION DIASTOLIC: 3.8 CM (ref 4.2–5.9)
ECHO LV INTERNAL DIMENSION SYSTOLIC INDEX: 1.23 CM/M2
ECHO LV INTERNAL DIMENSION SYSTOLIC: 2.8 CM
ECHO LV IVSD: 1.2 CM (ref 0.6–1)
ECHO LV MASS 2D: 153.2 G (ref 88–224)
ECHO LV MASS INDEX 2D: 67.5 G/M2 (ref 49–115)
ECHO LV POSTERIOR WALL DIASTOLIC: 1.2 CM (ref 0.6–1)
ECHO LV RELATIVE WALL THICKNESS RATIO: 0.63
ECHO LVOT AREA: 2.8 CM2
ECHO LVOT AV VTI INDEX: 0.64
ECHO LVOT DIAM: 1.9 CM
ECHO LVOT MEAN GRADIENT: 3 MMHG
ECHO LVOT PEAK GRADIENT: 6 MMHG
ECHO LVOT PEAK VELOCITY: 1.2 M/S
ECHO LVOT STROKE VOLUME INDEX: 35.3 ML/M2
ECHO LVOT SV: 80.2 ML
ECHO LVOT VTI: 28.3 CM
ECHO MV A VELOCITY: 0.94 M/S
ECHO MV E VELOCITY: 0.66 M/S
ECHO MV E/A RATIO: 0.7
ECHO MV E/E' LATERAL: 9.43
ECHO MV E/E' RATIO (AVERAGED): 9.43
ECHO MV E/E' SEPTAL: 9.43
ECHO RA AREA 4C: 10.5 CM2
ECHO RA END SYSTOLIC VOLUME APICAL 4 CHAMBER INDEX BSA: 10 ML/M2
ECHO RA VOLUME: 23 ML
ECHO RIGHT VENTRICULAR SYSTOLIC PRESSURE (RVSP): 18 MMHG
ECHO RV BASAL DIMENSION: 3 CM
ECHO RV FREE WALL PEAK S': 13 CM/S
ECHO RV LONGITUDINAL DIMENSION: 7.1 CM
ECHO RV MID DIMENSION: 3.2 CM
ECHO RV TAPSE: 2.1 CM (ref 1.7–?)
ECHO TV REGURGITANT MAX VELOCITY: 1.91 M/S
ECHO TV REGURGITANT PEAK GRADIENT: 15 MMHG

## 2024-08-21 PROCEDURE — 93306 TTE W/DOPPLER COMPLETE: CPT | Performed by: INTERNAL MEDICINE

## 2024-08-21 PROCEDURE — 93306 TTE W/DOPPLER COMPLETE: CPT

## 2024-08-21 ASSESSMENT — SLEEP AND FATIGUE QUESTIONNAIRES
HOW LIKELY ARE YOU TO NOD OFF OR FALL ASLEEP IN A CAR, WHILE STOPPED FOR A FEW MINUTES IN TRAFFIC: WOULD NEVER DOZE
HOW LIKELY ARE YOU TO NOD OFF OR FALL ASLEEP WHEN YOU ARE A PASSENGER IN A CAR FOR AN HOUR WITHOUT A BREAK: SLIGHT CHANCE OF DOZING
HOW LIKELY ARE YOU TO NOD OFF OR FALL ASLEEP WHILE SITTING AND TALKING TO SOMEONE: WOULD NEVER DOZE
HOW LIKELY ARE YOU TO NOD OFF OR FALL ASLEEP WHILE WATCHING TV: MODERATE CHANCE OF DOZING
HOW LIKELY ARE YOU TO NOD OFF OR FALL ASLEEP WHILE LYING DOWN TO REST IN THE AFTERNOON WHEN CIRCUMSTANCES PERMIT: MODERATE CHANCE OF DOZING
HOW LIKELY ARE YOU TO NOD OFF OR FALL ASLEEP WHILE SITTING QUIETLY AFTER LUNCH WITHOUT ALCOHOL: SLIGHT CHANCE OF DOZING
HOW LIKELY ARE YOU TO NOD OFF OR FALL ASLEEP WHILE SITTING AND READING: SLIGHT CHANCE OF DOZING
ESS TOTAL SCORE: 8
HOW LIKELY ARE YOU TO NOD OFF OR FALL ASLEEP WHILE SITTING INACTIVE IN A PUBLIC PLACE: SLIGHT CHANCE OF DOZING

## 2024-08-21 NOTE — PROGRESS NOTES
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 He was diagnosed 28 years ago. Patient has been on PAP therapy since then. His current device is 7 years old and sometimes stops working.    Pertinent PMHx includes: DM2, HLD, gout, hypothyroidism.    He was diagnosed with severe obstructive sleep apnea and is being treated with PAP therapy.   He has been on PAP therapy for about 28 years.  He states he wears the PAP device every night.   He goes to bed at 11pm-midnight. He falls asleep in about 1 hour.  He awakens 0-1 times per night and takes naps . The average total amount of sleep is about 2-7 hours per night. He does not use sleep aid/s. Symptoms of sleep apnea have improved since using PAP therapy.  He continues to have complaints of irregular sleep schedule (takes naps in the afternoons and sometimes evenings).  He is not snoring with the machine on.  He denies any complaints of too high pressure, hunger for air, dry mouth / nose, mask fit / discomfort issues, low air humidity, high air humidity, temperature issues, and high/frequent leaks.   DME: [employer]  Mask:  F&P nasal mask  Machine: Dreamstation auto CPAP      DATA REVIEWED TODAY:    Diagnostic Review  HST on 6/7/2017 showed apnea hypopnea index of 47.6/h with oxygen desaturation down to a shirlene of 79%.       [place] & Insomnia Severity Index scores      8/21/2024    10:03 AM 2/10/2020     8:23 AM 2/5/2019    12:48 PM 2/6/2018    12:53 PM 8/8/2017     2:21 PM 5/23/2017    10:08 AM   Sleep Medicine   Sitting and reading 1 2 1 2 1 2   Watching TV 2 2 2 2 2 2   Sitting, inactive in a public place (e.g. a theatre or a meeting) 1 1 1 1 1 1   As a passenger in a car for an hour without a break 1 0 2 0 1 2   Lying down to rest in the afternoon when circumstances

## 2024-08-21 NOTE — PROGRESS NOTES
Diagnosis: [x] MAO  (G47.33) [] CSA (G47.31) []  Other:__________________   Length of Need: [] 13 months [x]  99 Months                                          []  Other:__________________   Machine (LAINE): [] Respironics Auto (with modem for remote monitoring)       [] Other:____________________    [x]  ResMed Auto (with modem for remote monitoring)    [x]  CPAP () [] Bilevel ()   Mode: Mode:   [x] Auto [] Fixed [] Auto [] Spontaneous   Pmin:_____7____cmH2O      Pmax:_____17____cmH2O   P:_________cmH2O    EPAPmin:__________cmH2O IPAP:__________cmH2O     IPAPmax:__________cmH2O EPAP:__________cmH2O     PSmin:_______  PSmax:_______       (ResMed) PS:_________     Flex/EPR - 3 full time                          Ramp time: 20 min Flex/EPR - 3 full time                 Ramp time: 30 min   Ramp Pressure:_____5______cmH2O Ramp Pressure:____________cmH2O         Humidifier: [x] Heated ()                                               [] Passive     [x] Water chamber replacement ()/ 1 per 6 months   Mask:   [x] Nasal () /1 per 3 months [] Full Face () /1 per 3 months   [x] Patient choice -Size and fit mask [] Patient Choice - Size and fit mask   [x] Dispense: nasal cushion  [x] Dispense:  F&P Eson 2 nasal CPAP medium   [] Dispense:    [x] Headgear () / 1 per 3 months [] Headgear () / 1 per 3 months   [x] Replacement Nasal Cushion ()/2 per month [] Interface Replacement ()/1 per month   [] Replacement Nasal Pillows ()/2 per month       Tubing: [x] Heated ()/1 per 3 months                           [] Standard ()/1 per 3 months  [] Other:____________________   Filters: [x] Non-disposable ()/1 per 6 months                 [x] Ultra-Fine, Disposable ()/2 per month     Miscellaneous: [x] Chin Strap as needed ()/ 1 per 6months      [] Other:__________________________________         Start Order Date: 08/21/24    MEDICAL JUSTIFICATION:  I, the

## 2024-08-21 NOTE — PATIENT INSTRUCTIONS
MAO education:    You have obstructive sleep apnea (MAO):    1. Obstructive sleep apnea (MAO) is a condition where the upper airway narrows or closes intermittently during sleep. This can lead to drops in your oxygen levels during sleep, arousals from sleep, and excessive daytime sleepiness.     2. Untreated MAO is associated with increased risk of hypertension, cardiac disease, myocardial infarction, stroke, and poor blood sugar control. Treating your MAO can decrease these risks.    3. Weight loss does improve sleep apnea. It is important to have a healthy diet and an exercise program.     4. Alcohol and sedating medicine can make MAO worse and should be avoided in particular before sleep.    5. If you have surgery or are hospitalized, tell your doctor that you have MAO and bring your CPAP to the hospital.    6. If you are drowsy or sleepy, you should not drive. If you are driving and become drowsy or sleepy, you should pull over to a safe place. Below are our drowsy driving tips.     PAP machine care:   You should clean your PAP regularly (see your PAP  site for more details)  Daily cleaning   1. Wipe mask with a damp towel with mild detergent, rinse with a damp towel and let air dry. You can also see CPAP cleaning wipes. Avoid harsh cleaning wipes or chemicals.    2. Humidifier- empty water daily. Fill with clean distilled water before use before sleep.    Weekly Cleaning   1. Clean mask, headgear, and tubing weekly  2. Fill your sink with warm water and a few drops of mild dish soap. Swirl equipment for at least 5 minutes. Rinse well and air dry. Hang hose over something so the water droplets drip out.   3. Clean filter- rinse with warm water, squeeze excess water and blot dry with towel. If there is a white filter (respironics machine)- do not wash that - it is disposable and should be changed once a month.   4. Humidifier- Disinfect in solution that is one part vinegar and 5 parts water for 30

## 2024-09-13 ENCOUNTER — TELEPHONE (OUTPATIENT)
Dept: PULMONOLOGY | Age: 74
End: 2024-09-13

## 2024-09-23 DIAGNOSIS — E11.65 POORLY CONTROLLED TYPE 2 DIABETES MELLITUS WITH NEUROPATHY (HCC): ICD-10-CM

## 2024-09-23 DIAGNOSIS — E11.40 POORLY CONTROLLED TYPE 2 DIABETES MELLITUS WITH NEUROPATHY (HCC): ICD-10-CM

## 2024-09-23 RX ORDER — BLOOD-GLUCOSE SENSOR
EACH MISCELLANEOUS
Qty: 2 EACH | Refills: 3 | Status: SHIPPED | OUTPATIENT
Start: 2024-09-23 | End: 2024-09-25 | Stop reason: SDUPTHER

## 2024-09-25 ENCOUNTER — TELEPHONE (OUTPATIENT)
Dept: ENDOCRINOLOGY | Age: 74
End: 2024-09-25

## 2024-09-25 DIAGNOSIS — E11.65 POORLY CONTROLLED TYPE 2 DIABETES MELLITUS WITH NEUROPATHY (HCC): ICD-10-CM

## 2024-09-25 DIAGNOSIS — E11.40 POORLY CONTROLLED TYPE 2 DIABETES MELLITUS WITH NEUROPATHY (HCC): ICD-10-CM

## 2024-09-25 RX ORDER — BLOOD-GLUCOSE SENSOR
EACH MISCELLANEOUS
Qty: 2 EACH | Refills: 0 | Status: SHIPPED | OUTPATIENT
Start: 2024-09-25

## 2024-09-30 DIAGNOSIS — E11.65 POORLY CONTROLLED TYPE 2 DIABETES MELLITUS WITH NEUROPATHY (HCC): Primary | ICD-10-CM

## 2024-09-30 DIAGNOSIS — E11.40 POORLY CONTROLLED TYPE 2 DIABETES MELLITUS WITH NEUROPATHY (HCC): Primary | ICD-10-CM

## 2024-10-01 RX ORDER — INSULIN GLARGINE 100 [IU]/ML
INJECTION, SOLUTION SUBCUTANEOUS
Qty: 30 ML | Refills: 1 | Status: SHIPPED | OUTPATIENT
Start: 2024-10-01

## 2024-10-07 DIAGNOSIS — I10 ESSENTIAL HYPERTENSION: ICD-10-CM

## 2024-10-07 DIAGNOSIS — E03.9 ACQUIRED HYPOTHYROIDISM: ICD-10-CM

## 2024-10-07 RX ORDER — POTASSIUM CITRATE 10 MEQ/1
TABLET, EXTENDED RELEASE ORAL
Qty: 90 TABLET | Refills: 1 | Status: SHIPPED | OUTPATIENT
Start: 2024-10-07

## 2024-10-07 RX ORDER — LEVOTHYROXINE SODIUM 100 UG/1
TABLET ORAL
Qty: 90 TABLET | Refills: 0 | Status: SHIPPED | OUTPATIENT
Start: 2024-10-07

## 2024-10-07 NOTE — TELEPHONE ENCOUNTER
Medication:   Requested Prescriptions     Pending Prescriptions Disp Refills    potassium citrate (UROCIT-K) 10 MEQ (1080 MG) extended release tablet [Pharmacy Med Name: POTASSIUM CITRATE ER 10 MEQ 10 MEQ Tablet] 90 tablet 1     Si TABLET ONCE DAILY (PRESCRIBER OK IS NEEDED FOR NEXT REFILL)        Last Filled:  24    Patient Phone Number: [phone] (home)     Last appt: 2024   Next appt: 2025    Last OARRS:       2024    11:07 AM   RX Monitoring   Periodic Controlled Substance Monitoring No signs of potential drug abuse or diversion identified.

## 2024-10-10 ENCOUNTER — TELEPHONE (OUTPATIENT)
Dept: INTERNAL MEDICINE CLINIC | Age: 74
End: 2024-10-10

## 2024-10-10 NOTE — TELEPHONE ENCOUNTER
Called pharmacy and stated that they need a Prior Authorization before they can fill it. Separate encounter started for Prior Authorization.

## 2024-10-10 NOTE — TELEPHONE ENCOUNTER
Pt calling in regarding Androgel. [facility] but he says there needs to be a call made to that pharmacy - he didn't say what the problem was. He said he would like this taken care of 'within the next 10 minutes.\"

## 2024-10-10 NOTE — TELEPHONE ENCOUNTER
Patient called due to issue with   Testosterone (ANDROGEL) 20.25 MG/ACT (1.62%) GEL gel. Spoke with the pharmacy and they're stating that a prior authorization is needed for this medication.

## 2024-10-11 NOTE — TELEPHONE ENCOUNTER
Submitted PA for Testosterone (ANDROGEL) 20.25 MG/ACT (1.62%) GEL gel.   Via [facility] (Key: [id number]) STATUS: PENDING.    Follow up done daily; if no decision with in three days we will refax.  If another three days goes by with no decision will call the insurance for status.

## 2024-10-14 NOTE — TELEPHONE ENCOUNTER
Call to pt informed per  feels pt should go to emergency room today. With symptoms she described may be a tia. Can get all tests needed through er quickly. Pt verbalized understanding and will go to er today. ralph   The medication is APPROVED THRU 10/11/2025    If this requires a response please respond to the pool ( P MHCX PSC MEDICATION PRE-AUTH).      Thank you please advise patient.

## 2024-10-15 DIAGNOSIS — M1A.9XX0 CHRONIC GOUT WITHOUT TOPHUS, UNSPECIFIED CAUSE, UNSPECIFIED SITE: ICD-10-CM

## 2024-10-15 RX ORDER — ALLOPURINOL 300 MG/1
TABLET ORAL
Qty: 90 TABLET | Refills: 1 | Status: SHIPPED | OUTPATIENT
Start: 2024-10-15

## 2024-10-17 DIAGNOSIS — E11.65 POORLY CONTROLLED TYPE 2 DIABETES MELLITUS WITH NEUROPATHY (HCC): ICD-10-CM

## 2024-10-17 DIAGNOSIS — E11.40 POORLY CONTROLLED TYPE 2 DIABETES MELLITUS WITH NEUROPATHY (HCC): ICD-10-CM

## 2024-10-17 RX ORDER — BLOOD-GLUCOSE SENSOR
EACH MISCELLANEOUS
Qty: 2 EACH | Refills: 1 | Status: SHIPPED | OUTPATIENT
Start: 2024-10-17

## 2024-10-21 DIAGNOSIS — E55.9 VITAMIN D DEFICIENCY: ICD-10-CM

## 2024-10-21 RX ORDER — ERGOCALCIFEROL 1.25 MG/1
CAPSULE ORAL
Qty: 12 CAPSULE | Refills: 4 | Status: SHIPPED | OUTPATIENT
Start: 2024-10-21

## 2024-10-21 ASSESSMENT — SLEEP AND FATIGUE QUESTIONNAIRES
HOW LIKELY ARE YOU TO NOD OFF OR FALL ASLEEP IN A CAR, WHILE STOPPED FOR A FEW MINUTES IN TRAFFIC: WOULD NEVER DOZE
HOW LIKELY ARE YOU TO NOD OFF OR FALL ASLEEP WHEN YOU ARE A PASSENGER IN A CAR FOR AN HOUR WITHOUT A BREAK: WOULD NEVER DOZE
HOW LIKELY ARE YOU TO NOD OFF OR FALL ASLEEP IN A CAR, WHILE STOPPED FOR A FEW MINUTES IN TRAFFIC: WOULD NEVER DOZE
HOW LIKELY ARE YOU TO NOD OFF OR FALL ASLEEP WHEN YOU ARE A PASSENGER IN A CAR FOR AN HOUR WITHOUT A BREAK: WOULD NEVER DOZE
HOW LIKELY ARE YOU TO NOD OFF OR FALL ASLEEP WHILE SITTING AND READING: SLIGHT CHANCE OF DOZING
HOW LIKELY ARE YOU TO NOD OFF OR FALL ASLEEP WHILE WATCHING TV: MODERATE CHANCE OF DOZING
HOW LIKELY ARE YOU TO NOD OFF OR FALL ASLEEP WHILE SITTING AND TALKING TO SOMEONE: WOULD NEVER DOZE
HOW LIKELY ARE YOU TO NOD OFF OR FALL ASLEEP WHILE SITTING QUIETLY AFTER LUNCH WITHOUT ALCOHOL: SLIGHT CHANCE OF DOZING
HOW LIKELY ARE YOU TO NOD OFF OR FALL ASLEEP WHILE LYING DOWN TO REST IN THE AFTERNOON WHEN CIRCUMSTANCES PERMIT: MODERATE CHANCE OF DOZING
HOW LIKELY ARE YOU TO NOD OFF OR FALL ASLEEP WHILE SITTING INACTIVE IN A PUBLIC PLACE: SLIGHT CHANCE OF DOZING
HOW LIKELY ARE YOU TO NOD OFF OR FALL ASLEEP WHILE LYING DOWN TO REST IN THE AFTERNOON WHEN CIRCUMSTANCES PERMIT: MODERATE CHANCE OF DOZING
HOW LIKELY ARE YOU TO NOD OFF OR FALL ASLEEP WHILE SITTING AND READING: SLIGHT CHANCE OF DOZING
HOW LIKELY ARE YOU TO NOD OFF OR FALL ASLEEP WHILE SITTING QUIETLY AFTER LUNCH WITHOUT ALCOHOL: SLIGHT CHANCE OF DOZING
HOW LIKELY ARE YOU TO NOD OFF OR FALL ASLEEP WHILE SITTING INACTIVE IN A PUBLIC PLACE: SLIGHT CHANCE OF DOZING
HOW LIKELY ARE YOU TO NOD OFF OR FALL ASLEEP WHILE SITTING AND TALKING TO SOMEONE: WOULD NEVER DOZE
ESS TOTAL SCORE: 7
HOW LIKELY ARE YOU TO NOD OFF OR FALL ASLEEP WHILE WATCHING TV: MODERATE CHANCE OF DOZING

## 2024-10-24 ENCOUNTER — OFFICE VISIT (OUTPATIENT)
Dept: PULMONOLOGY | Age: 74
End: 2024-10-24
Payer: MEDICARE

## 2024-10-24 VITALS
HEIGHT: 70 IN | DIASTOLIC BLOOD PRESSURE: 80 MMHG | SYSTOLIC BLOOD PRESSURE: 128 MMHG | WEIGHT: 238 LBS | OXYGEN SATURATION: 96 % | HEART RATE: 84 BPM | BODY MASS INDEX: 34.07 KG/M2

## 2024-10-24 DIAGNOSIS — E66.811 OBESITY (BMI 30.0-34.9): ICD-10-CM

## 2024-10-24 DIAGNOSIS — G47.33 OSA ON CPAP: Primary | ICD-10-CM

## 2024-10-24 DIAGNOSIS — I10 ESSENTIAL HYPERTENSION: ICD-10-CM

## 2024-10-24 PROCEDURE — 1123F ACP DISCUSS/DSCN MKR DOCD: CPT | Performed by: STUDENT IN AN ORGANIZED HEALTH CARE EDUCATION/TRAINING PROGRAM

## 2024-10-24 PROCEDURE — 3079F DIAST BP 80-89 MM HG: CPT | Performed by: STUDENT IN AN ORGANIZED HEALTH CARE EDUCATION/TRAINING PROGRAM

## 2024-10-24 PROCEDURE — 3074F SYST BP LT 130 MM HG: CPT | Performed by: STUDENT IN AN ORGANIZED HEALTH CARE EDUCATION/TRAINING PROGRAM

## 2024-10-24 PROCEDURE — 99214 OFFICE O/P EST MOD 30 MIN: CPT | Performed by: STUDENT IN AN ORGANIZED HEALTH CARE EDUCATION/TRAINING PROGRAM

## 2024-10-24 PROCEDURE — G2211 COMPLEX E/M VISIT ADD ON: HCPCS | Performed by: STUDENT IN AN ORGANIZED HEALTH CARE EDUCATION/TRAINING PROGRAM

## 2024-10-24 NOTE — PROGRESS NOTES
P:_________cmH2O    EPAPmin:__________cmH2O IPAP:__________cmH2O     IPAPmax:__________cmH2O EPAP:__________cmH2O     PSmin:_______  PSmax:_______       (ResMed) PS:_________     Flex/EPR - 3 full time                          Ramp time: 30 min Flex/EPR - 3 full time                 Ramp time: 30 min   Ramp Pressure:_____4______cmH2O Ramp Pressure:____________cmH2O         Humidifier: [x] Heated ()                                               [] Passive     [x] Water chamber replacement ()/ 1 per 6 months   Mask:   [x] Nasal () /1 per 3 months [] Full Face () /1 per 3 months   [x] Patient choice -Size and fit mask [] Patient Choice - Size and fit mask   [x] Dispense: nasal pillow  [] Dispense:    [] Dispense:    [x] Headgear () / 1 per 3 months [] Headgear () / 1 per 3 months   [] Replacement Nasal Cushion ()/2 per month [] Interface Replacement ()/1 per month   [x] Replacement Nasal Pillows ()/2 per month       Tubing: [x] Heated ()/1 per 3 months                           [] Standard ()/1 per 3 months  [] Other:____________________   Filters: [x] Non-disposable ()/1 per 6 months                 [x] Ultra-Fine, Disposable ()/2 per month     Miscellaneous: [x] Chin Strap as needed ()/ 1 per 6months      [] Other:__________________________________         Start Order Date: 10/24/24    MEDICAL JUSTIFICATION:  I, the undersigned, certify that the above prescribed supplies are medically necessary for this patient’s wellbeing.  In my opinion, the supplies are both reasonable and necessary in reference to accepted standards of medicalpractice in treatment of this patient’s condition.    Dontrell Corado MD    NPI: [id number]       Order Signed Date: 10/24/24      Wilfrid Bacon MD  1950  [address] [address]  [phone] (home)   [phone] (mobile)    Insurance:   Active Insurance as of 10/24/2024       Primary Coverage

## 2024-10-24 NOTE — PROGRESS NOTES
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 He was diagnosed with severe obstructive sleep apnea and is being treated with PAP therapy.   He has been on PAP therapy for 28 years ago.  He states he wears the PAP device every night.     He falls asleep in variable durations of time   .  He awakens 0-1 times per night. The average total amount of sleep is about 5 hours per night and takes no naps. He does not use sleep aid/s. Symptoms of sleep apnea have improved since using PAP therapy.  He continues to have complaints of irregular sleep schedule.  He is not snoring with the machine on.  He denies any complaints of dry mouth / nose and high/frequent leaks.   DME: [employer]  Mask: AirFit F20 large  Machine: [employer] Airsense 11 Autoset      DATA REVIEWED TODAY:    Diagnostic Review  HST on 6/7/2017 showed apnea hypopnea index of 47.6/h with oxygen desaturation down to a shirlene of 79%.       [place]           10/21/2024     4:47 PM 8/21/2024    10:03 AM 2/10/2020     8:23 AM 2/5/2019    12:48 PM 2/6/2018    12:53 PM 8/8/2017     2:21 PM 5/23/2017    10:08 AM   Sleep Medicine   Sitting and reading 1 1 2 1 2 1 2   Watching TV 2 2 2 2 2 2 2   Sitting, inactive in a public place (e.g. a theatre or a meeting) 1 1 1 1 1 1 1   As a passenger in a car for an hour without a break 0 1 0 2 0 1 2   Lying down to rest in the afternoon when circumstances permit 2 2 2 2 2 2 2   Sitting and talking to someone 0 0 1 0 1 0 1   Sitting quietly after a lunch without alcohol 1 1 2 1 2 1 1   In a car, while stopped for a few minutes in traffic 0 0 0 0 0 0 1   [place] Sleepiness Score 7 8 10 9 10 8 12   Neck (Inches)       20       PAP Adherence (dates: 9/5/2024 - 10/22/2024)  Total days used: 48/48  % used days >= 4 hours: 96%  Average hours used per day: 5 hrs 9 mins  Mode:

## 2024-10-24 NOTE — PATIENT INSTRUCTIONS
effects of magnetic fields.  Note, not all models or variants of medical devices listed in the contraindications are affected by external magnetic fields. If you are not sure whether an implant or medical device falls under the contraindications, or you require additional information on the potential adverse effects of magnetic fields for your particular implant or medical device, please contact your physician/doctor.    Contact Information   Customers in the [place] with questions about this recall should contact [employer] at call [phone].

## 2024-12-16 DIAGNOSIS — E11.40 POORLY CONTROLLED TYPE 2 DIABETES MELLITUS WITH NEUROPATHY (HCC): ICD-10-CM

## 2024-12-16 DIAGNOSIS — E11.65 POORLY CONTROLLED TYPE 2 DIABETES MELLITUS WITH NEUROPATHY (HCC): ICD-10-CM

## 2024-12-16 RX ORDER — SEMAGLUTIDE 2.68 MG/ML
INJECTION, SOLUTION SUBCUTANEOUS
Qty: 3 ML | Refills: 3 | Status: SHIPPED | OUTPATIENT
Start: 2024-12-16

## 2024-12-16 RX ORDER — INSULIN GLARGINE 100 [IU]/ML
INJECTION, SOLUTION SUBCUTANEOUS
Qty: 30 ML | Refills: 1 | Status: SHIPPED | OUTPATIENT
Start: 2024-12-16

## 2025-01-07 RX ORDER — EMPAGLIFLOZIN 25 MG/1
TABLET, FILM COATED ORAL
Qty: 90 TABLET | Refills: 1 | Status: SHIPPED | OUTPATIENT
Start: 2025-01-07

## 2025-01-14 DIAGNOSIS — E11.42 DIABETIC POLYNEUROPATHY ASSOCIATED WITH TYPE 2 DIABETES MELLITUS (HCC): ICD-10-CM

## 2025-01-14 DIAGNOSIS — E03.9 ACQUIRED HYPOTHYROIDISM: ICD-10-CM

## 2025-01-14 DIAGNOSIS — E78.2 MIXED HYPERLIPIDEMIA: ICD-10-CM

## 2025-01-14 RX ORDER — ATORVASTATIN CALCIUM 20 MG/1
TABLET, FILM COATED ORAL
Qty: 90 TABLET | Refills: 1 | Status: SHIPPED | OUTPATIENT
Start: 2025-01-14

## 2025-01-14 RX ORDER — LEVOTHYROXINE SODIUM 100 UG/1
TABLET ORAL
Qty: 90 TABLET | Refills: 0 | Status: SHIPPED | OUTPATIENT
Start: 2025-01-14

## 2025-01-14 RX ORDER — OMEGA-3-ACID ETHYL ESTERS 1 G/1
CAPSULE, LIQUID FILLED ORAL
Qty: 360 CAPSULE | Refills: 1 | Status: SHIPPED | OUTPATIENT
Start: 2025-01-14

## 2025-01-14 NOTE — TELEPHONE ENCOUNTER
Medication:   Requested Prescriptions     Pending Prescriptions Disp Refills    atorvastatin (LIPITOR) 20 MG tablet [Pharmacy Med Name: ATORVASTATIN 20MG TAB CAMBE 20 Tablet] 90 tablet 1     Sig: TAKE ONE TABLET DAILY FOR CHOLESTEROL (GENERIC FOR LIPITOR) (PRESCRIBER OK IS NEEDED FOR NEXT REFILL)    omega-3 acid ethyl esters (LOVAZA) 1 g capsule [Pharmacy Med Name: OMEGA-3-ACID ETHYL ESTERS 1 1 Capsule] 360 capsule 1     Si CAPSULES 2 TIMES A DAY (PRESCRIBER OK IS NEEDED FOR NEXT REFILL)        Last Filled:  24 and 24    Patient Phone Number: [phone] (home)     Last appt: 2024   Next appt: 2025    Last OARRS:       2024    11:07 AM   RX Monitoring   Periodic Controlled Substance Monitoring No signs of potential drug abuse or diversion identified.

## 2025-01-23 ENCOUNTER — OFFICE VISIT (OUTPATIENT)
Dept: PULMONOLOGY | Age: 75
End: 2025-01-23
Payer: MEDICARE

## 2025-01-23 VITALS
WEIGHT: 238.6 LBS | DIASTOLIC BLOOD PRESSURE: 72 MMHG | HEART RATE: 84 BPM | BODY MASS INDEX: 34.16 KG/M2 | HEIGHT: 70 IN | SYSTOLIC BLOOD PRESSURE: 118 MMHG | OXYGEN SATURATION: 96 %

## 2025-01-23 DIAGNOSIS — G47.33 OSA ON CPAP: Primary | ICD-10-CM

## 2025-01-23 DIAGNOSIS — I10 ESSENTIAL HYPERTENSION: ICD-10-CM

## 2025-01-23 PROCEDURE — 99214 OFFICE O/P EST MOD 30 MIN: CPT | Performed by: STUDENT IN AN ORGANIZED HEALTH CARE EDUCATION/TRAINING PROGRAM

## 2025-01-23 PROCEDURE — 1123F ACP DISCUSS/DSCN MKR DOCD: CPT | Performed by: STUDENT IN AN ORGANIZED HEALTH CARE EDUCATION/TRAINING PROGRAM

## 2025-01-23 PROCEDURE — 3078F DIAST BP <80 MM HG: CPT | Performed by: STUDENT IN AN ORGANIZED HEALTH CARE EDUCATION/TRAINING PROGRAM

## 2025-01-23 PROCEDURE — G2211 COMPLEX E/M VISIT ADD ON: HCPCS | Performed by: STUDENT IN AN ORGANIZED HEALTH CARE EDUCATION/TRAINING PROGRAM

## 2025-01-23 PROCEDURE — 1159F MED LIST DOCD IN RCRD: CPT | Performed by: STUDENT IN AN ORGANIZED HEALTH CARE EDUCATION/TRAINING PROGRAM

## 2025-01-23 PROCEDURE — 3074F SYST BP LT 130 MM HG: CPT | Performed by: STUDENT IN AN ORGANIZED HEALTH CARE EDUCATION/TRAINING PROGRAM

## 2025-01-23 ASSESSMENT — SLEEP AND FATIGUE QUESTIONNAIRES
ESS TOTAL SCORE: 11
HOW LIKELY ARE YOU TO NOD OFF OR FALL ASLEEP WHILE SITTING AND TALKING TO SOMEONE: WOULD NEVER DOZE
HOW LIKELY ARE YOU TO NOD OFF OR FALL ASLEEP WHILE SITTING INACTIVE IN A PUBLIC PLACE: SLIGHT CHANCE OF DOZING
HOW LIKELY ARE YOU TO NOD OFF OR FALL ASLEEP IN A CAR, WHILE STOPPED FOR A FEW MINUTES IN TRAFFIC: WOULD NEVER DOZE
HOW LIKELY ARE YOU TO NOD OFF OR FALL ASLEEP WHILE WATCHING TV: MODERATE CHANCE OF DOZING
HOW LIKELY ARE YOU TO NOD OFF OR FALL ASLEEP WHEN YOU ARE A PASSENGER IN A CAR FOR AN HOUR WITHOUT A BREAK: MODERATE CHANCE OF DOZING
HOW LIKELY ARE YOU TO NOD OFF OR FALL ASLEEP WHILE SITTING QUIETLY AFTER LUNCH WITHOUT ALCOHOL: SLIGHT CHANCE OF DOZING
HOW LIKELY ARE YOU TO NOD OFF OR FALL ASLEEP WHILE LYING DOWN TO REST IN THE AFTERNOON WHEN CIRCUMSTANCES PERMIT: HIGH CHANCE OF DOZING
HOW LIKELY ARE YOU TO NOD OFF OR FALL ASLEEP WHILE SITTING AND READING: MODERATE CHANCE OF DOZING

## 2025-01-23 NOTE — PROGRESS NOTES
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 He was diagnosed with severe obstructive sleep apnea and is being treated with PAP therapy.   He has been on PAP therapy for several years.  He states he wears the PAP device every night.  At previous visit, his device was suspected to be dysfunctional [employer] worked on fixing it, while he was using a loaner.    He falls asleep in  15 minutes.  He awakens 0-1 times per night. The average total amount of sleep is about 5-6 hours per night and takes naps. He does not use sleep aid/s. Symptoms of sleep apnea have improved since using PAP therapy.  He is not snoring with the machine on.  He denies any complaints of too high pressure, hunger for air, dry mouth / nose, mask fit / discomfort issues, low air humidity, high air humidity, temperature issues, and high/frequent leaks.   DME: [employer]  Mask: AirFit F20 large  Machine: ResMed Airsense 11 Autoset        DATA REVIEWED TODAY:     Diagnostic Review  HST on 6/7/2017 showed apnea hypopnea index of 47.6/h with oxygen desaturation down to a shirlene of 79%.       [place]           1/23/2025     2:23 PM 10/21/2024     4:47 PM 8/21/2024    10:03 AM 2/10/2020     8:23 AM 2/5/2019    12:48 PM 2/6/2018    12:53 PM 8/8/2017     2:21 PM   Sleep Medicine   Sitting and reading 2 1 1 2 1 2 1   Watching TV 2 2 2 2 2 2 2   Sitting, inactive in a public place (e.g. a theatre or a meeting) 1 1 1 1 1 1 1   As a passenger in a car for an hour without a break 2 0 1 0 2 0 1   Lying down to rest in the afternoon when circumstances permit 3 2 2 2 2 2 2   Sitting and talking to someone 0 0 0 1 0 1 0   Sitting quietly after a lunch without alcohol 1 1 1 2 1 2 1   In a car, while stopped for a few minutes in traffic 0 0 0 0 0 0 0   [place] Sleepiness Score 11 7 8 10 9 10 8       PAP

## 2025-01-29 DIAGNOSIS — I10 ESSENTIAL HYPERTENSION: ICD-10-CM

## 2025-01-29 RX ORDER — BENAZEPRIL HYDROCHLORIDE 20 MG/1
TABLET ORAL
Qty: 90 TABLET | Refills: 1 | Status: SHIPPED | OUTPATIENT
Start: 2025-01-29

## 2025-02-03 DIAGNOSIS — R74.8 ELEVATED ALKALINE PHOSPHATASE LEVEL: ICD-10-CM

## 2025-02-03 DIAGNOSIS — E11.40 POORLY CONTROLLED TYPE 2 DIABETES MELLITUS WITH NEUROPATHY (HCC): ICD-10-CM

## 2025-02-03 DIAGNOSIS — M1A.9XX0 CHRONIC GOUT WITHOUT TOPHUS, UNSPECIFIED CAUSE, UNSPECIFIED SITE: ICD-10-CM

## 2025-02-03 DIAGNOSIS — R79.89 ELEVATED SERUM CREATININE: ICD-10-CM

## 2025-02-03 DIAGNOSIS — E11.65 POORLY CONTROLLED TYPE 2 DIABETES MELLITUS WITH NEUROPATHY (HCC): ICD-10-CM

## 2025-02-03 DIAGNOSIS — I10 ESSENTIAL HYPERTENSION: ICD-10-CM

## 2025-02-03 DIAGNOSIS — E03.9 ACQUIRED HYPOTHYROIDISM: ICD-10-CM

## 2025-02-03 DIAGNOSIS — E78.2 MIXED HYPERLIPIDEMIA: ICD-10-CM

## 2025-02-03 DIAGNOSIS — E55.9 VITAMIN D DEFICIENCY: ICD-10-CM

## 2025-02-03 LAB
25(OH)D3 SERPL-MCNC: 74 NG/ML
ALBUMIN SERPL-MCNC: 4.7 G/DL (ref 3.4–5)
ALBUMIN/GLOB SERPL: 2 {RATIO} (ref 1.1–2.2)
ALP SERPL-CCNC: 131 U/L (ref 40–129)
ALP SERPL-CCNC: 132 U/L (ref 40–129)
ALT SERPL-CCNC: 38 U/L (ref 10–40)
ANION GAP SERPL CALCULATED.3IONS-SCNC: 8 MMOL/L (ref 3–16)
AST SERPL-CCNC: 32 U/L (ref 15–37)
BILIRUB SERPL-MCNC: 0.4 MG/DL (ref 0–1)
BUN SERPL-MCNC: 18 MG/DL (ref 7–20)
CALCIUM SERPL-MCNC: 9.9 MG/DL (ref 8.3–10.6)
CHLORIDE SERPL-SCNC: 101 MMOL/L (ref 99–110)
CHOLEST SERPL-MCNC: 127 MG/DL (ref 0–199)
CO2 SERPL-SCNC: 31 MMOL/L (ref 21–32)
CREAT SERPL-MCNC: 1.1 MG/DL (ref 0.8–1.3)
CREAT UR-MCNC: 100 MG/DL (ref 39–259)
EST. AVERAGE GLUCOSE BLD GHB EST-MCNC: 182.9 MG/DL
GFR SERPLBLD CREATININE-BSD FMLA CKD-EPI: 70 ML/MIN/{1.73_M2}
GLUCOSE SERPL-MCNC: 144 MG/DL (ref 70–99)
HBA1C MFR BLD: 8 %
HDLC SERPL-MCNC: 31 MG/DL (ref 40–60)
LDL CHOLESTEROL: 51 MG/DL
MICROALBUMIN UR DL<=1MG/L-MCNC: <1.2 MG/DL
MICROALBUMIN/CREAT UR: NORMAL MG/G (ref 0–30)
POTASSIUM SERPL-SCNC: 4.4 MMOL/L (ref 3.5–5.1)
PROT SERPL-MCNC: 7.1 G/DL (ref 6.4–8.2)
SODIUM SERPL-SCNC: 140 MMOL/L (ref 136–145)
T4 FREE SERPL-MCNC: 1.5 NG/DL (ref 0.9–1.8)
TRIGL SERPL-MCNC: 227 MG/DL (ref 0–150)
TSH SERPL DL<=0.005 MIU/L-ACNC: 0.62 UIU/ML (ref 0.27–4.2)
URATE SERPL-MCNC: 4.1 MG/DL (ref 3.5–7.2)
VLDLC SERPL CALC-MCNC: 45 MG/DL

## 2025-02-05 ENCOUNTER — OFFICE VISIT (OUTPATIENT)
Dept: ENDOCRINOLOGY | Age: 75
End: 2025-02-05
Payer: MEDICARE

## 2025-02-05 ENCOUNTER — TELEPHONE (OUTPATIENT)
Dept: ENDOCRINOLOGY | Age: 75
End: 2025-02-05

## 2025-02-05 VITALS
WEIGHT: 239 LBS | HEART RATE: 82 BPM | DIASTOLIC BLOOD PRESSURE: 57 MMHG | RESPIRATION RATE: 14 BRPM | OXYGEN SATURATION: 98 % | SYSTOLIC BLOOD PRESSURE: 115 MMHG | BODY MASS INDEX: 34.22 KG/M2 | TEMPERATURE: 98 F | HEIGHT: 70 IN

## 2025-02-05 DIAGNOSIS — I10 ESSENTIAL HYPERTENSION: ICD-10-CM

## 2025-02-05 DIAGNOSIS — E11.65 POORLY CONTROLLED TYPE 2 DIABETES MELLITUS WITH NEUROPATHY (HCC): Primary | ICD-10-CM

## 2025-02-05 DIAGNOSIS — R79.89 ELEVATED SERUM CREATININE: ICD-10-CM

## 2025-02-05 DIAGNOSIS — E66.811 CLASS 1 OBESITY WITH SERIOUS COMORBIDITY AND BODY MASS INDEX (BMI) OF 34.0 TO 34.9 IN ADULT, UNSPECIFIED OBESITY TYPE: ICD-10-CM

## 2025-02-05 DIAGNOSIS — E78.2 MIXED HYPERLIPIDEMIA: ICD-10-CM

## 2025-02-05 DIAGNOSIS — G47.30 SLEEP APNEA, UNSPECIFIED TYPE: ICD-10-CM

## 2025-02-05 DIAGNOSIS — E11.40 POORLY CONTROLLED TYPE 2 DIABETES MELLITUS WITH NEUROPATHY (HCC): Primary | ICD-10-CM

## 2025-02-05 DIAGNOSIS — E03.9 ACQUIRED HYPOTHYROIDISM: ICD-10-CM

## 2025-02-05 DIAGNOSIS — E55.9 VITAMIN D DEFICIENCY: ICD-10-CM

## 2025-02-05 PROCEDURE — 3074F SYST BP LT 130 MM HG: CPT | Performed by: INTERNAL MEDICINE

## 2025-02-05 PROCEDURE — 3078F DIAST BP <80 MM HG: CPT | Performed by: INTERNAL MEDICINE

## 2025-02-05 PROCEDURE — 99214 OFFICE O/P EST MOD 30 MIN: CPT | Performed by: INTERNAL MEDICINE

## 2025-02-05 PROCEDURE — 1123F ACP DISCUSS/DSCN MKR DOCD: CPT | Performed by: INTERNAL MEDICINE

## 2025-02-05 PROCEDURE — 1160F RVW MEDS BY RX/DR IN RCRD: CPT | Performed by: INTERNAL MEDICINE

## 2025-02-05 PROCEDURE — 1159F MED LIST DOCD IN RCRD: CPT | Performed by: INTERNAL MEDICINE

## 2025-02-05 PROCEDURE — 3052F HG A1C>EQUAL 8.0%<EQUAL 9.0%: CPT | Performed by: INTERNAL MEDICINE

## 2025-02-05 PROCEDURE — 95251 CONT GLUC MNTR ANALYSIS I&R: CPT | Performed by: INTERNAL MEDICINE

## 2025-02-05 RX ORDER — ACETAMINOPHEN 500 MG
500 TABLET ORAL
COMMUNITY

## 2025-02-05 RX ORDER — LEVOTHYROXINE SODIUM 100 UG/1
TABLET ORAL
Qty: 90 TABLET | Refills: 1 | Status: SHIPPED | OUTPATIENT
Start: 2025-02-05

## 2025-02-05 RX ORDER — INSULIN LISPRO 200 [IU]/ML
INJECTION, SOLUTION SUBCUTANEOUS
Qty: 54 ML | Refills: 3 | Status: SHIPPED | OUTPATIENT
Start: 2025-02-05

## 2025-02-05 RX ORDER — SEMAGLUTIDE 2.4 MG/.75ML
2.4 INJECTION, SOLUTION SUBCUTANEOUS
Qty: 9 ML | Refills: 1 | Status: SHIPPED | OUTPATIENT
Start: 2025-02-05

## 2025-02-05 RX ORDER — INSULIN GLARGINE 100 [IU]/ML
INJECTION, SOLUTION SUBCUTANEOUS
Qty: 30 ML | Refills: 1 | Status: SHIPPED | OUTPATIENT
Start: 2025-02-05

## 2025-02-05 RX ORDER — SEMAGLUTIDE 2.68 MG/ML
2 INJECTION, SOLUTION SUBCUTANEOUS
Qty: 3 ML | Refills: 3 | Status: SHIPPED | OUTPATIENT
Start: 2025-02-05

## 2025-02-05 RX ORDER — ACYCLOVIR 800 MG/1
TABLET ORAL
Qty: 2 EACH | Refills: 1 | Status: SHIPPED | OUTPATIENT
Start: 2025-02-05

## 2025-02-05 NOTE — TELEPHONE ENCOUNTER
Per md, please submit PA for wegovy. But please make sure that BOTH DX of sleep apnea and obesity are on the pa request. Also, please make note that pt is NOT on ozempic (although currently in chart). We can d/c it if needed.     Thank you!

## 2025-02-05 NOTE — PROGRESS NOTES
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    Current symptoms/problems include  hyperglycemia  and show no change.     1.  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     Comorbid conditions:  hyperlipidemia, HTN and Neuropathy    Current diabetic medications include: Ozempic, Humalog 15 units 3 times daily, Lantus 40 mg twice daily, Jardiance 25 mg daily    Intolerance to diabetes medications: Metformin upset GI     Weight trend: stable  Prior visit with dietician: yes  Current diet: on average, 3 meals per day  Current exercise: Golf twice weekly, with walks daily, swimming twice a week     Current monitoring regimen: home blood tests - 4 times daily  Has brought blood glucose log/meter:  Yes  Home blood sugar records: fasting range: 105 - 130 and postprandial range:  130-140  Any episodes of hypoglycemia? None   Hypoglycemia frequency and time(s):  1-2 episodes per year   Does patient have Glucagon emergency kit? No  Does patient have rapid acting carbohydrate?  Yes  Does patient wear a medic alert bracelet or necklace?  No    2. Acquired hypothyroidism  Has fatigue.    3. Essential hypertension  No headaches.    4. Mixed hyperlipidemia  No muscle pain.    5. Vitamin D deficiency  Has fatigue.    6. Obesity  On diet, exercise    7.  Elevated creatinine  No urination problems    8. Sleep apnea  On C-pap  Has fatigue.    THYROID ULTRASOUND-       INDICATION- Type 2 diabetes, history of hypothyroidism. Patient is   status post left thyroid lobectomy.       FINDINGS-        The right thyroid lobe appears normal, measuring 4.9 x 1.4 x 1.6   cm. Thyroid parenchyma is mildly heterogeneous but no focal lesion   is detected. The left thyroid lobe is not visualized.       IMPRESSION-        1. Mild heterogeneity of the right thyroid lobe with no focal   lesion detected.       2. Status post left

## 2025-02-06 ENCOUNTER — OFFICE VISIT (OUTPATIENT)
Dept: INTERNAL MEDICINE CLINIC | Age: 75
End: 2025-02-06
Payer: MEDICARE

## 2025-02-06 ENCOUNTER — OFFICE VISIT (OUTPATIENT)
Dept: INTERNAL MEDICINE CLINIC | Age: 75
End: 2025-02-06

## 2025-02-06 VITALS
BODY MASS INDEX: 34.04 KG/M2 | OXYGEN SATURATION: 98 % | SYSTOLIC BLOOD PRESSURE: 112 MMHG | WEIGHT: 237.22 LBS | DIASTOLIC BLOOD PRESSURE: 66 MMHG | HEART RATE: 68 BPM

## 2025-02-06 VITALS
HEIGHT: 70 IN | SYSTOLIC BLOOD PRESSURE: 112 MMHG | WEIGHT: 237.2 LBS | DIASTOLIC BLOOD PRESSURE: 66 MMHG | HEART RATE: 68 BPM | BODY MASS INDEX: 33.96 KG/M2

## 2025-02-06 DIAGNOSIS — R01.1 HEART MURMUR: ICD-10-CM

## 2025-02-06 DIAGNOSIS — I10 ESSENTIAL HYPERTENSION: ICD-10-CM

## 2025-02-06 DIAGNOSIS — Z00.00 MEDICARE ANNUAL WELLNESS VISIT, SUBSEQUENT: Primary | ICD-10-CM

## 2025-02-06 DIAGNOSIS — E29.1 TESTOSTERONE DEFICIENCY IN MALE: ICD-10-CM

## 2025-02-06 DIAGNOSIS — E29.1 PRIMARY MALE HYPOGONADISM: Primary | ICD-10-CM

## 2025-02-06 DIAGNOSIS — E29.1 PRIMARY HYPOGONADISM IN MALE: ICD-10-CM

## 2025-02-06 DIAGNOSIS — E78.2 MIXED HYPERLIPIDEMIA: ICD-10-CM

## 2025-02-06 PROCEDURE — 3078F DIAST BP <80 MM HG: CPT | Performed by: INTERNAL MEDICINE

## 2025-02-06 PROCEDURE — 3074F SYST BP LT 130 MM HG: CPT | Performed by: INTERNAL MEDICINE

## 2025-02-06 PROCEDURE — G0439 PPPS, SUBSEQ VISIT: HCPCS | Performed by: INTERNAL MEDICINE

## 2025-02-06 PROCEDURE — 1123F ACP DISCUSS/DSCN MKR DOCD: CPT | Performed by: INTERNAL MEDICINE

## 2025-02-06 PROCEDURE — 1159F MED LIST DOCD IN RCRD: CPT | Performed by: INTERNAL MEDICINE

## 2025-02-06 SDOH — ECONOMIC STABILITY: FOOD INSECURITY: WITHIN THE PAST 12 MONTHS, YOU WORRIED THAT YOUR FOOD WOULD RUN OUT BEFORE YOU GOT MONEY TO BUY MORE.: NEVER TRUE

## 2025-02-06 SDOH — ECONOMIC STABILITY: FOOD INSECURITY: WITHIN THE PAST 12 MONTHS, THE FOOD YOU BOUGHT JUST DIDN'T LAST AND YOU DIDN'T HAVE MONEY TO GET MORE.: NEVER TRUE

## 2025-02-06 ASSESSMENT — PATIENT HEALTH QUESTIONNAIRE - PHQ9
SUM OF ALL RESPONSES TO PHQ9 QUESTIONS 1 & 2: 0
1. LITTLE INTEREST OR PLEASURE IN DOING THINGS: NOT AT ALL
SUM OF ALL RESPONSES TO PHQ QUESTIONS 1-9: 0
2. FEELING DOWN, DEPRESSED OR HOPELESS: NOT AT ALL
SUM OF ALL RESPONSES TO PHQ QUESTIONS 1-9: 0

## 2025-02-06 ASSESSMENT — ENCOUNTER SYMPTOMS
NAUSEA: 0
VOMITING: 0
ABDOMINAL PAIN: 0
WHEEZING: 0
SHORTNESS OF BREATH: 0

## 2025-02-06 ASSESSMENT — LIFESTYLE VARIABLES
HAS A RELATIVE, FRIEND, DOCTOR, OR ANOTHER HEALTH PROFESSIONAL EXPRESSED CONCERN ABOUT YOUR DRINKING OR SUGGESTED YOU CUT DOWN: NO
HAVE YOU OR SOMEONE ELSE BEEN INJURED AS A RESULT OF YOUR DRINKING: NO
HOW OFTEN DURING THE LAST YEAR HAVE YOU FAILED TO DO WHAT WAS NORMALLY EXPECTED FROM YOU BECAUSE OF DRINKING: NEVER
HOW OFTEN DURING THE LAST YEAR HAVE YOU FOUND THAT YOU WERE NOT ABLE TO STOP DRINKING ONCE YOU HAD STARTED: NEVER
HOW OFTEN DURING THE LAST YEAR HAVE YOU BEEN UNABLE TO REMEMBER WHAT HAPPENED THE NIGHT BEFORE BECAUSE YOU HAD BEEN DRINKING: NEVER
HOW OFTEN DO YOU HAVE A DRINK CONTAINING ALCOHOL: 4 OR MORE TIMES A WEEK
HOW MANY STANDARD DRINKS CONTAINING ALCOHOL DO YOU HAVE ON A TYPICAL DAY: 1 OR 2
HOW OFTEN DURING THE LAST YEAR HAVE YOU NEEDED AN ALCOHOLIC DRINK FIRST THING IN THE MORNING TO GET YOURSELF GOING AFTER A NIGHT OF HEAVY DRINKING: NEVER
HOW OFTEN DURING THE LAST YEAR HAVE YOU HAD A FEELING OF GUILT OR REMORSE AFTER DRINKING: NEVER

## 2025-02-06 NOTE — PROGRESS NOTES
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    SUBJECTIVE:    Chief Complaint   Patient presents with    6 Month Follow-Up     Pt is here for 6 month follow up, no other concerns at this time.       HPI:    Follow-up visit for chronic problems.  Recent hemoglobin A1c 8.0.  Endocrinologist recently changed Ozempic to Wegovy and patient is waiting to get the medication filled.  History of diabetic polyneuropathy symptom has been stable.    History of hypogonadism.  Patient continue to take testosterone gel.  He has not taking gel regularly in the recent past.    History of hypertension.  Denies orthostatic symptoms chest pain dizziness shortness of breath.    Past Medical History:   Diagnosis Date    Hearing loss     Hypogonadism in male     Hypothyroidism     Kidney stones     MAO (obstructive sleep apnea)     Type II or unspecified type diabetes mellitus without mention of complication, not stated as uncontrolled      Past Surgical History:   Procedure Laterality Date    CATARACT REMOVAL WITH IMPLANT Bilateral     CHOLECYSTECTOMY      9/11    COLONOSCOPY      2009    COLONOSCOPY  01/14/2021    ,Normal colonoscopy,Diverticulosis. repeat 5-7 years.    THYROIDECTOMY      lt lobectomy 2/2010    TONSILLECTOMY      TOOTH EXTRACTION       Social History     Socioeconomic History    Marital status:      Spouse name: None    Number of children: None    Years of education: None    Highest education level: None   Occupational History    Occupation: retired pediatrician   Tobacco Use    Smoking status: Never     Passive exposure: Never    Smokeless tobacco: Never    Tobacco comments:     Some cigars throughout the year   Vaping Use    Vaping status: Never Used   Substance and Sexual Activity    Alcohol use: Not Currently     Alcohol/week: 1.0 standard drink of alcohol     Types: 1 Cans of beer per week     Comment: 4 of 7 days     Drug use: No    Sexual activity: Not Currently     Partners: Female     Social

## 2025-02-06 NOTE — PROGRESS NOTES
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     Subjective   Patient is all caught up on his recommended CARE GAPS. Patient is not due for Colorectal cancer screening until 02/2026    Patient's complete Health Risk Assessment and screening values have been reviewed and are found in Flowsheets. The following problems were reviewed today and where indicated follow up appointments were made and/or referrals ordered.    Positive Risk Factor Screenings with Interventions:                Abnormal BMI (obese):  Body mass index is 34.04 kg/m². (!) Abnormal  Interventions:  See AVS for additional education material     Hearing Screen:  Do you or your family notice any trouble with your hearing that hasn't been managed with hearing aids?: (!) Yes (Hearing loss in right ear for 10 years now)    Interventions:  See AVS for additional education material               Objective   Vitals:    02/06/25 0846   BP: 112/66   Pulse: 68   SpO2: 98%   Weight: 107.6 kg (237 lb 3.4 oz)      Body mass index is 34.04 kg/m².        Wt Readings from Last 3 Encounters:   02/06/25 107.6 kg (237 lb 3.4 oz)   02/06/25 107.6 kg (237 lb 3.2 oz)   02/05/25 108.4 kg (239 lb)     Temp Readings from Last 3 Encounters:   02/05/25 98 °F (36.7 °C)   07/31/24 98 °F (36.7 °C)   03/20/24 98 °F (36.7 °C)     BP Readings from Last 3 Encounters:   02/06/25 112/66   02/06/25 112/66   02/05/25 (!) 115/57     Pulse Readings from Last 3 Encounters:   02/06/25 68   02/06/25 68   02/05/25 82                Allergies   Allergen Reactions    Zosyn [Piperacillin Sod-Tazobactam So]     Demerol      vomiting    Metformin Other (See Comments)     Intolerent    Metformin And Related      intolerant     Prior to Visit Medications    Medication Sig Taking? Authorizing Provider   acetaminophen (TYLENOL) 500 MG tablet Take 1

## 2025-02-07 LAB
SHBG SERPL-SCNC: 49 NMOL/L (ref 19–76)
TESTOST FREE SERPL-MCNC: 70.7 PG/ML (ref 47–244)
TESTOST SERPL-MCNC: 444 NG/DL (ref 193–740)

## 2025-02-07 NOTE — TELEPHONE ENCOUNTER
Submitted PA for Wegovy  [facility]ia [facility] Key: [id number] STATUS: PENDING.    Follow up done daily; if no decision with in three days we will refax.  If another three days goes by with no decision will call the insurance for status.

## 2025-02-10 NOTE — TELEPHONE ENCOUNTER
The medication was DENIED; DENIAL letter is uploaded to MEDIA.      Drug is not covered by the plan ( Plan Exclusion)       If this requires a response please respond to the pool ( P MHCX PSC MEDICATION PRE-AUTH).      Thank you please advise patient.

## 2025-02-11 NOTE — TELEPHONE ENCOUNTER
States denied due to Medicare not covering weight loss medications. Please advise if we should try with just sleep apnea dx?

## 2025-02-12 NOTE — TELEPHONE ENCOUNTER
Submitted PA for Wegovy  Via [facility] Key: [id number]   STATUS: This plan does not accept appeal requests via ePA. A request for this medication was denied within the last 65 days. To request an appeal, please refer to the information provided on the initial denial letter     If this requires a response please respond to the pool ( P MHCX PSC MEDICATION PRE-AUTH).      Thank you please advise patient.

## 2025-02-12 NOTE — TELEPHONE ENCOUNTER
Wegovy (semaglutide) is not approved to treat sleep apnea, but it may help improve symptoms for people with sleep apnea and obesity. Medicare Part D may cover Wegovy for people with obesity and cardiovascular disease.     Medicare Part D (prescription drug coverage) now covers Zepbound (tirzepatide) for the treatment of obstructive sleep apnea (MAO) in adults with obesity.     See above.  I left patient a message that most likely because Wegovy will not be approved with sleep apnea diagnosis, because it does not have cardiovascular disease indication.  However, we can submit for Zepbound with the diagnosis of obstructive sleep apnea.      Please call patient to make sure he received the message and let me know if he would like to proceed.  The dose remains unclear how directly to transfer to Zepbound, therefore I recommend to go to the medium dose 7.5 or 10 mg from his current 2 mg Ozempic.  Please advise patient that higher doses might cause more nausea, but lower doses may not be as effective.  According to [employer], there is no recommendation to transfer to the highest doses, they recommend to to start with the lowest and go every month increasingly as tolerated.    We can also appeal for Wegovy, but I do not have much of an argument other than may help improve symptoms with sleep apnea and obesity.

## 2025-02-13 NOTE — TELEPHONE ENCOUNTER
LVM to return call    Wegovy (semaglutide) is not approved to treat sleep apnea, but it may help improve symptoms for people with sleep apnea and obesity. Medicare Part D may cover Wegovy for people with obesity and cardiovascular disease.      Medicare Part D (prescription drug coverage) now covers Zepbound (tirzepatide) for the treatment of obstructive sleep apnea (MAO) in adults with obesity.      See above.  I left patient a message that most likely because Wegovy will not be approved with sleep apnea diagnosis, because it does not have cardiovascular disease indication.  However, we can submit for Zepbound with the diagnosis of obstructive sleep apnea.       Please call patient to make sure he received the message and let me know if he would like to proceed.  The dose remains unclear how directly to transfer to Zepbound, therefore I recommend to go to the medium dose 7.5 or 10 mg from his current 2 mg Ozempic.  Please advise patient that higher doses might cause more nausea, but lower doses may not be as effective.  According to [employer], there is no recommendation to transfer to the highest doses, they recommend to to start with the lowest and go every month increasingly as tolerated.     We can also appeal for Wegovy, but I do not have much of an argument other than may help improve symptoms with sleep apnea and obesity.

## 2025-02-14 NOTE — TELEPHONE ENCOUNTER
2nd attempt to contact pt   LVM to return call     Wegovy (semaglutide) is not approved to treat sleep apnea, but it may help improve symptoms for people with sleep apnea and obesity. Medicare Part D may cover Wegovy for people with obesity and cardiovascular disease.      Medicare Part D (prescription drug coverage) now covers Zepbound (tirzepatide) for the treatment of obstructive sleep apnea (MAO) in adults with obesity.      See above.  I left patient a message that most likely because Wegovy will not be approved with sleep apnea diagnosis, because it does not have cardiovascular disease indication.  However, we can submit for Zepbound with the diagnosis of obstructive sleep apnea.       Please call patient to make sure he received the message and let me know if he would like to proceed.  The dose remains unclear how directly to transfer to Zepbound, therefore I recommend to go to the medium dose 7.5 or 10 mg from his current 2 mg Ozempic.  Please advise patient that higher doses might cause more nausea, but lower doses may not be as effective.  According to [employer], there is no recommendation to transfer to the highest doses, they recommend to to start with the lowest and go every month increasingly as tolerated.     We can also appeal for Wegovy, but I do not have much of an argument other than may help improve symptoms with sleep apnea and obesity.

## 2025-02-17 NOTE — TELEPHONE ENCOUNTER
3rd attempt to contact ptlvm to return call.    Wegovy (semaglutide) is not approved to treat sleep apnea, but it may help improve symptoms for people with sleep apnea and obesity. Medicare Part D may cover Wegovy for people with obesity and cardiovascular disease.      Medicare Part D (prescription drug coverage) now covers Zepbound (tirzepatide) for the treatment of obstructive sleep apnea (MAO) in adults with obesity.      See above.  I left patient a message that most likely because Wegovy will not be approved with sleep apnea diagnosis, because it does not have cardiovascular disease indication.  However, we can submit for Zepbound with the diagnosis of obstructive sleep apnea.       Please call patient to make sure he received the message and let me know if he would like to proceed.  The dose remains unclear how directly to transfer to Zepbound, therefore I recommend to go to the medium dose 7.5 or 10 mg from his current 2 mg Ozempic.  Please advise patient that higher doses might cause more nausea, but lower doses may not be as effective.  According to [employer], there is no recommendation to transfer to the highest doses, they recommend to to start with the lowest and go every month increasingly as tolerated.     We can also appeal for Wegovy, but I do not have much of an argument other than may help improve symptoms with sleep apnea and obesity.

## 2025-02-18 NOTE — TELEPHONE ENCOUNTER
Spoke with patient.  He decided to stay on Ozempic 2 mg weekly dose.  Will revisit the other options during appointment in the future.

## 2025-03-15 DIAGNOSIS — E11.65 POORLY CONTROLLED TYPE 2 DIABETES MELLITUS WITH NEUROPATHY (HCC): ICD-10-CM

## 2025-03-15 DIAGNOSIS — E11.40 POORLY CONTROLLED TYPE 2 DIABETES MELLITUS WITH NEUROPATHY (HCC): ICD-10-CM

## 2025-03-17 RX ORDER — INSULIN LISPRO 200 [IU]/ML
INJECTION, SOLUTION SUBCUTANEOUS
Qty: 54 ML | Refills: 1 | Status: SHIPPED | OUTPATIENT
Start: 2025-03-17

## 2025-04-09 DIAGNOSIS — E11.65 POORLY CONTROLLED TYPE 2 DIABETES MELLITUS WITH NEUROPATHY (HCC): ICD-10-CM

## 2025-04-09 DIAGNOSIS — I10 ESSENTIAL HYPERTENSION: ICD-10-CM

## 2025-04-09 DIAGNOSIS — E11.40 POORLY CONTROLLED TYPE 2 DIABETES MELLITUS WITH NEUROPATHY (HCC): ICD-10-CM

## 2025-04-09 DIAGNOSIS — E29.1 TESTOSTERONE DEFICIENCY IN MALE: ICD-10-CM

## 2025-04-09 RX ORDER — POTASSIUM CITRATE 1080 MG/1
TABLET, EXTENDED RELEASE ORAL
Qty: 90 TABLET | Refills: 2 | Status: SHIPPED | OUTPATIENT
Start: 2025-04-09

## 2025-04-09 RX ORDER — TESTOSTERONE 1.62 MG/G
GEL TRANSDERMAL
Qty: 75 G | Refills: 2 | Status: SHIPPED | OUTPATIENT
Start: 2025-04-09 | End: 2025-07-08

## 2025-04-09 NOTE — TELEPHONE ENCOUNTER
PDMP Monitoring:    Last PDMP Mario as Reviewed:  Review User Review Instant Review Result   MD CLAUDIO STUART 4/9/2025 10:42 AM Reviewed PDMP [1]     [unfilled]  Urine Drug Screenings (1 yr)    No resulted procedures found.       Medication Contract and Consent for Opioid Use Documents Filed        No documents found                  Order is sent.

## 2025-04-10 RX ORDER — ACYCLOVIR 800 MG/1
TABLET ORAL
Qty: 6 EACH | Refills: 0 | Status: SHIPPED | OUTPATIENT
Start: 2025-04-10

## 2025-04-17 DIAGNOSIS — M1A.9XX0 CHRONIC GOUT WITHOUT TOPHUS, UNSPECIFIED CAUSE, UNSPECIFIED SITE: ICD-10-CM

## 2025-04-17 RX ORDER — ALLOPURINOL 300 MG/1
TABLET ORAL
Qty: 90 TABLET | Refills: 2 | Status: SHIPPED | OUTPATIENT
Start: 2025-04-17

## 2025-05-27 DIAGNOSIS — E11.65 POORLY CONTROLLED TYPE 2 DIABETES MELLITUS WITH NEUROPATHY (HCC): ICD-10-CM

## 2025-05-27 DIAGNOSIS — E11.40 POORLY CONTROLLED TYPE 2 DIABETES MELLITUS WITH NEUROPATHY (HCC): ICD-10-CM

## 2025-05-27 RX ORDER — INSULIN GLARGINE 100 [IU]/ML
INJECTION, SOLUTION SUBCUTANEOUS
Qty: 30 ML | Refills: 0 | Status: SHIPPED | OUTPATIENT
Start: 2025-05-27

## 2025-07-01 DIAGNOSIS — E11.40 POORLY CONTROLLED TYPE 2 DIABETES MELLITUS WITH NEUROPATHY (HCC): ICD-10-CM

## 2025-07-01 DIAGNOSIS — E11.65 POORLY CONTROLLED TYPE 2 DIABETES MELLITUS WITH NEUROPATHY (HCC): ICD-10-CM

## 2025-07-01 RX ORDER — INSULIN GLARGINE 100 [IU]/ML
INJECTION, SOLUTION SUBCUTANEOUS
Qty: 30 ML | Refills: 0 | Status: SHIPPED | OUTPATIENT
Start: 2025-07-01

## 2025-07-01 RX ORDER — SEMAGLUTIDE 2.68 MG/ML
INJECTION, SOLUTION SUBCUTANEOUS
Qty: 3 ML | Refills: 0 | Status: SHIPPED | OUTPATIENT
Start: 2025-07-01

## 2025-07-03 DIAGNOSIS — E78.2 MIXED HYPERLIPIDEMIA: ICD-10-CM

## 2025-07-03 RX ORDER — EMPAGLIFLOZIN 25 MG/1
TABLET, FILM COATED ORAL
Qty: 90 TABLET | Refills: 1 | Status: SHIPPED | OUTPATIENT
Start: 2025-07-03

## 2025-07-03 RX ORDER — ATORVASTATIN CALCIUM 20 MG/1
TABLET, FILM COATED ORAL
Qty: 90 TABLET | Refills: 1 | Status: SHIPPED | OUTPATIENT
Start: 2025-07-03

## 2025-07-16 DIAGNOSIS — E11.40 POORLY CONTROLLED TYPE 2 DIABETES MELLITUS WITH NEUROPATHY (HCC): ICD-10-CM

## 2025-07-16 DIAGNOSIS — E11.65 POORLY CONTROLLED TYPE 2 DIABETES MELLITUS WITH NEUROPATHY (HCC): ICD-10-CM

## 2025-07-16 RX ORDER — ACYCLOVIR 800 MG/1
TABLET ORAL
Qty: 6 EACH | Refills: 0 | Status: SHIPPED | OUTPATIENT
Start: 2025-07-16

## 2025-07-20 ASSESSMENT — SLEEP AND FATIGUE QUESTIONNAIRES
HOW LIKELY ARE YOU TO NOD OFF OR FALL ASLEEP WHILE SITTING QUIETLY AFTER LUNCH WITHOUT ALCOHOL: WOULD NEVER DOZE
HOW LIKELY ARE YOU TO NOD OFF OR FALL ASLEEP WHILE WATCHING TV: SLIGHT CHANCE OF DOZING
HOW LIKELY ARE YOU TO NOD OFF OR FALL ASLEEP WHILE SITTING QUIETLY AFTER LUNCH WITHOUT ALCOHOL: WOULD NEVER DOZE
HOW LIKELY ARE YOU TO NOD OFF OR FALL ASLEEP WHILE SITTING AND TALKING TO SOMEONE: WOULD NEVER DOZE
HOW LIKELY ARE YOU TO NOD OFF OR FALL ASLEEP IN A CAR, WHILE STOPPED FOR A FEW MINUTES IN TRAFFIC: WOULD NEVER DOZE
HOW LIKELY ARE YOU TO NOD OFF OR FALL ASLEEP WHILE LYING DOWN TO REST IN THE AFTERNOON WHEN CIRCUMSTANCES PERMIT: MODERATE CHANCE OF DOZING
HOW LIKELY ARE YOU TO NOD OFF OR FALL ASLEEP WHEN YOU ARE A PASSENGER IN A CAR FOR AN HOUR WITHOUT A BREAK: SLIGHT CHANCE OF DOZING
HOW LIKELY ARE YOU TO NOD OFF OR FALL ASLEEP IN A CAR, WHILE STOPPED FOR A FEW MINUTES IN TRAFFIC: WOULD NEVER DOZE
HOW LIKELY ARE YOU TO NOD OFF OR FALL ASLEEP WHEN YOU ARE A PASSENGER IN A CAR FOR AN HOUR WITHOUT A BREAK: SLIGHT CHANCE OF DOZING
HOW LIKELY ARE YOU TO NOD OFF OR FALL ASLEEP WHILE WATCHING TV: SLIGHT CHANCE OF DOZING
HOW LIKELY ARE YOU TO NOD OFF OR FALL ASLEEP WHILE SITTING AND READING: SLIGHT CHANCE OF DOZING
HOW LIKELY ARE YOU TO NOD OFF OR FALL ASLEEP WHILE SITTING INACTIVE IN A PUBLIC PLACE: SLIGHT CHANCE OF DOZING
HOW LIKELY ARE YOU TO NOD OFF OR FALL ASLEEP WHILE SITTING AND TALKING TO SOMEONE: WOULD NEVER DOZE
HOW LIKELY ARE YOU TO NOD OFF OR FALL ASLEEP WHILE SITTING INACTIVE IN A PUBLIC PLACE: SLIGHT CHANCE OF DOZING
HOW LIKELY ARE YOU TO NOD OFF OR FALL ASLEEP WHILE SITTING AND READING: SLIGHT CHANCE OF DOZING
HOW LIKELY ARE YOU TO NOD OFF OR FALL ASLEEP WHILE LYING DOWN TO REST IN THE AFTERNOON WHEN CIRCUMSTANCES PERMIT: MODERATE CHANCE OF DOZING
ESS TOTAL SCORE: 6

## 2025-07-23 ENCOUNTER — OFFICE VISIT (OUTPATIENT)
Dept: PULMONOLOGY | Age: 75
End: 2025-07-23
Payer: MEDICARE

## 2025-07-23 VITALS
WEIGHT: 233.4 LBS | HEART RATE: 71 BPM | OXYGEN SATURATION: 95 % | DIASTOLIC BLOOD PRESSURE: 64 MMHG | BODY MASS INDEX: 33.41 KG/M2 | HEIGHT: 70 IN | SYSTOLIC BLOOD PRESSURE: 112 MMHG

## 2025-07-23 DIAGNOSIS — G47.33 OSA ON CPAP: Primary | ICD-10-CM

## 2025-07-23 DIAGNOSIS — E66.811 OBESITY (BMI 30.0-34.9): ICD-10-CM

## 2025-07-23 DIAGNOSIS — I10 ESSENTIAL HYPERTENSION: ICD-10-CM

## 2025-07-23 PROCEDURE — 1123F ACP DISCUSS/DSCN MKR DOCD: CPT | Performed by: STUDENT IN AN ORGANIZED HEALTH CARE EDUCATION/TRAINING PROGRAM

## 2025-07-23 PROCEDURE — 3078F DIAST BP <80 MM HG: CPT | Performed by: STUDENT IN AN ORGANIZED HEALTH CARE EDUCATION/TRAINING PROGRAM

## 2025-07-23 PROCEDURE — 99213 OFFICE O/P EST LOW 20 MIN: CPT | Performed by: STUDENT IN AN ORGANIZED HEALTH CARE EDUCATION/TRAINING PROGRAM

## 2025-07-23 PROCEDURE — 3074F SYST BP LT 130 MM HG: CPT | Performed by: STUDENT IN AN ORGANIZED HEALTH CARE EDUCATION/TRAINING PROGRAM

## 2025-07-23 PROCEDURE — 1159F MED LIST DOCD IN RCRD: CPT | Performed by: STUDENT IN AN ORGANIZED HEALTH CARE EDUCATION/TRAINING PROGRAM

## 2025-07-23 PROCEDURE — G2211 COMPLEX E/M VISIT ADD ON: HCPCS | Performed by: STUDENT IN AN ORGANIZED HEALTH CARE EDUCATION/TRAINING PROGRAM

## 2025-07-23 ASSESSMENT — SLEEP AND FATIGUE QUESTIONNAIRES
HOW LIKELY ARE YOU TO NOD OFF OR FALL ASLEEP WHILE SITTING INACTIVE IN A PUBLIC PLACE: WOULD NEVER DOZE
HOW LIKELY ARE YOU TO NOD OFF OR FALL ASLEEP WHILE SITTING AND READING: MODERATE CHANCE OF DOZING
ESS TOTAL SCORE: 7
HOW LIKELY ARE YOU TO NOD OFF OR FALL ASLEEP WHILE LYING DOWN TO REST IN THE AFTERNOON WHEN CIRCUMSTANCES PERMIT: MODERATE CHANCE OF DOZING
HOW LIKELY ARE YOU TO NOD OFF OR FALL ASLEEP WHEN YOU ARE A PASSENGER IN A CAR FOR AN HOUR WITHOUT A BREAK: WOULD NEVER DOZE
HOW LIKELY ARE YOU TO NOD OFF OR FALL ASLEEP IN A CAR, WHILE STOPPED FOR A FEW MINUTES IN TRAFFIC: WOULD NEVER DOZE
HOW LIKELY ARE YOU TO NOD OFF OR FALL ASLEEP WHILE SITTING QUIETLY AFTER LUNCH WITHOUT ALCOHOL: SLIGHT CHANCE OF DOZING
HOW LIKELY ARE YOU TO NOD OFF OR FALL ASLEEP WHILE SITTING AND TALKING TO SOMEONE: WOULD NEVER DOZE
HOW LIKELY ARE YOU TO NOD OFF OR FALL ASLEEP WHILE WATCHING TV: MODERATE CHANCE OF DOZING

## 2025-07-23 NOTE — PROGRESS NOTES
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 He was diagnosed with severe obstructive sleep apnea and is being treated with PAP therapy.   He has been on PAP therapy for several years.  He states he wears the PAP device every night.     He falls asleep in 15 minutes.  He awakens 0-1 times per night. The average total amount of sleep is about 5-6 hours per night and takes naps. He does not use sleep aid/s. Symptoms of sleep apnea have improved since using PAP therapy.  He is not snoring with the machine on.  He does complain of dry mouth / nose. Also about once a week the CPAP stops working for a few seconds then starts again.  DME: [employer]  Mask: AirFit F20 large  Machine: ResMed Airsense 11 Autoset        DATA REVIEWED TODAY:     Diagnostic Review  HST on 2017 showed apnea hypopnea index of 47.6/h with oxygen desaturation down to a shirlene of 79%.       [place]           2025     2:17 PM 2025    12:00 PM 2025     2:23 PM 10/21/2024     4:47 PM 2024    10:03 AM 2/10/2020     8:23 AM 2019    12:48 PM   Sleep Medicine   Sitting and reading 2 1 2 1 1 2 1   Watching TV 2 1 2 2 2 2 2   Sitting, inactive in a public place (e.g. a theatre or a meeting) 0 1 1 1 1 1 1   As a passenger in a car for an hour without a break 0 1 2 0 1 0 2   Lying down to rest in the afternoon when circumstances permit 2 2 3 2 2 2 2   Sitting and talking to someone 0 0 0 0 0 1 0   Sitting quietly after a lunch without alcohol 1 0 1 1 1 2 1   In a car, while stopped for a few minutes in traffic 0 0 0 0 0 0 0   [place] Sleepiness Score 7 6  11 7  8 10 9       Patient-reported       PAP Adherence (last 30 days)  Average hours used per day: 4.4 hrs   Mode: auto CPAP  Pressure settin-20 cmH2O  Average pressure (95%): 18 cmH2O  Leaks (95%): 56

## 2025-07-23 NOTE — PATIENT INSTRUCTIONS
MAO education:    You have sleep apnea:      Untreated sleep apnea is associated with increased risk of hypertension, cardiac disease, myocardial infarction, stroke, and poor blood sugar control. Treating your MAO can decrease these risks.    Weight loss does improve sleep apnea. It is important to have a healthy diet and an exercise program.     Alcohol and sedating medicine can make sleep apnea worse and should be avoided in particular before sleep.    If you have surgery or are hospitalized, tell your doctor that you have sleep apnea and bring your CPAP to the hospital.    If you are drowsy or sleepy, you should not drive. If you are driving and become drowsy or sleepy, you should pull over to a safe place. Below are our drowsy driving tips.     PAP machine care:   You should clean your PAP regularly (see your PAP  site for more details)  Daily cleaning   1. Wipe mask with a damp towel with mild detergent, rinse with a damp towel and let air dry. You can also see CPAP cleaning wipes. Avoid harsh cleaning wipes or chemicals.    2. Humidifier- empty water daily. Fill with clean distilled water before use before sleep.    Weekly Cleaning   1. Clean mask, headgear, and tubing weekly  2. Fill your sink with warm water and a few drops of mild dish soap. Swirl equipment for at least 5 minutes. Rinse well and air dry. Hang hose over something so the water droplets drip out.   3. Clean filter- rinse with warm water, squeeze excess water and blot dry with towel. If there is a white filter (respironics machine)- do not wash that - it is disposable and should be changed once a month.   4. Humidifier- Disinfect in solution that is one part vinegar and 5 parts water for 30 min. You can also put it in the top rack of  to clean.     Ask your medical equipment company about renewal of your supplies. At the very least- this should be done once a year.       Weight Loss  Weight Loss is an important part of

## 2025-07-23 NOTE — PROGRESS NOTES
Ordering Provider:   Dontrell Corado MD - Diplomate, [facility] Sleep Medicine  [address] [place] [address], [place] [address]    Phone [phone]  Fax [phone]      Diagnosis: [x] MAO  (G47.33) [] CSA (G47.31) []  Other:__________________   Length of Need: [] 13 months [x]  99 Months                                          []  Other:__________________   Machine (LAINE): [] Respironics Auto (with modem for remote monitoring)       [] Other:____________________    [x]  ResMed Auto (with modem for remote monitoring)    [x]  CPAP () [] Bilevel ()   Mode: Mode:   [x] Auto [] Fixed [] Auto [] Spontaneous   Pmin:_____18____cmH2O      Pmax:_____20____cmH2O   P:_________cmH2O    EPAPmin:__________cmH2O IPAP:__________cmH2O     IPAPmax:__________cmH2O EPAP:__________cmH2O     PSmin:_______  PSmax:_______       (ResMed) PS:_________     Flex/EPR - 3 full time                          Ramp time: 30 min Flex/EPR - 3 full time                 Ramp time: 30 min   Ramp Pressure:_____4______cmH2O Ramp Pressure:____________cmH2O         Humidifier: [x] Heated ()                                               [] Passive     [x] Water chamber replacement ()/ 1 per 6 months   Mask:   [] Nasal () /1 per 3 months [x] Full Face () /1 per 3 months   [] Patient choice -Size and fit mask [x] Patient Choice - Size and fit mask   [] Dispense: nasal cushion  [] Dispense:  [x] Dispense: full face mask  [] Dispense:    [] Headgear () / 1 per 3 months [x] Headgear () / 1 per 3 months   [] Replacement Nasal Cushion ()/2 per month [x] Interface Replacement ()/1 per month   [] Replacement Nasal Pillows ()/2 per month       Tubing: [x] Heated ()/1 per 3 months                           [] Standard ()/1 per 3 months  [] Other:____________________   Filters: [x] Non-disposable ()/1 per 6 months                 [x] Ultra-Fine, Disposable ()/2

## 2025-07-31 DIAGNOSIS — I10 ESSENTIAL HYPERTENSION: ICD-10-CM

## 2025-07-31 DIAGNOSIS — E11.40 POORLY CONTROLLED TYPE 2 DIABETES MELLITUS WITH NEUROPATHY (HCC): ICD-10-CM

## 2025-07-31 DIAGNOSIS — E11.65 POORLY CONTROLLED TYPE 2 DIABETES MELLITUS WITH NEUROPATHY (HCC): ICD-10-CM

## 2025-07-31 RX ORDER — SEMAGLUTIDE 2.68 MG/ML
INJECTION, SOLUTION SUBCUTANEOUS
Qty: 3 ML | Refills: 0 | Status: SHIPPED | OUTPATIENT
Start: 2025-07-31

## 2025-07-31 RX ORDER — BENAZEPRIL HYDROCHLORIDE 20 MG/1
TABLET ORAL
Qty: 90 TABLET | Refills: 0 | Status: SHIPPED | OUTPATIENT
Start: 2025-07-31

## 2025-08-05 DIAGNOSIS — E55.9 VITAMIN D DEFICIENCY: ICD-10-CM

## 2025-08-05 DIAGNOSIS — R79.89 ELEVATED SERUM CREATININE: ICD-10-CM

## 2025-08-05 DIAGNOSIS — G47.30 SLEEP APNEA, UNSPECIFIED TYPE: ICD-10-CM

## 2025-08-05 DIAGNOSIS — E11.65 POORLY CONTROLLED TYPE 2 DIABETES MELLITUS WITH NEUROPATHY (HCC): ICD-10-CM

## 2025-08-05 DIAGNOSIS — E78.2 MIXED HYPERLIPIDEMIA: ICD-10-CM

## 2025-08-05 DIAGNOSIS — E11.40 POORLY CONTROLLED TYPE 2 DIABETES MELLITUS WITH NEUROPATHY (HCC): ICD-10-CM

## 2025-08-05 DIAGNOSIS — E03.9 ACQUIRED HYPOTHYROIDISM: ICD-10-CM

## 2025-08-05 DIAGNOSIS — I10 ESSENTIAL HYPERTENSION: ICD-10-CM

## 2025-08-05 LAB
25(OH)D3 SERPL-MCNC: 79.2 NG/ML
ALBUMIN SERPL-MCNC: 4.6 G/DL (ref 3.4–5)
ALBUMIN/GLOB SERPL: 1.9 {RATIO} (ref 1.1–2.2)
ALP SERPL-CCNC: 115 U/L (ref 40–129)
ALT SERPL-CCNC: 37 U/L (ref 10–40)
ANION GAP SERPL CALCULATED.3IONS-SCNC: 10 MMOL/L (ref 3–16)
AST SERPL-CCNC: 28 U/L (ref 15–37)
BILIRUB SERPL-MCNC: 0.5 MG/DL (ref 0–1)
BUN SERPL-MCNC: 22 MG/DL (ref 7–20)
CALCIUM SERPL-MCNC: 9.6 MG/DL (ref 8.3–10.6)
CHLORIDE SERPL-SCNC: 103 MMOL/L (ref 99–110)
CHOLEST SERPL-MCNC: 121 MG/DL (ref 0–199)
CO2 SERPL-SCNC: 29 MMOL/L (ref 21–32)
CREAT SERPL-MCNC: 1.2 MG/DL (ref 0.8–1.3)
CREAT UR-MCNC: 70.4 MG/DL (ref 39–259)
EST. AVERAGE GLUCOSE BLD GHB EST-MCNC: 185.8 MG/DL
GFR SERPLBLD CREATININE-BSD FMLA CKD-EPI: 63 ML/MIN/{1.73_M2}
GLUCOSE SERPL-MCNC: 141 MG/DL (ref 70–99)
HBA1C MFR BLD: 8.1 %
HDLC SERPL-MCNC: 31 MG/DL (ref 40–60)
LDL CHOLESTEROL: 54 MG/DL
MICROALBUMIN UR DL<=1MG/L-MCNC: <1.2 MG/DL
MICROALBUMIN/CREAT UR: NORMAL MG/G (ref 0–30)
POTASSIUM SERPL-SCNC: 4.3 MMOL/L (ref 3.5–5.1)
PROT SERPL-MCNC: 7 G/DL (ref 6.4–8.2)
SODIUM SERPL-SCNC: 142 MMOL/L (ref 136–145)
T4 FREE SERPL-MCNC: 1.4 NG/DL (ref 0.9–1.8)
TRIGL SERPL-MCNC: 182 MG/DL (ref 0–150)
TSH SERPL DL<=0.005 MIU/L-ACNC: 0.69 UIU/ML (ref 0.27–4.2)
VLDLC SERPL CALC-MCNC: 36 MG/DL

## 2025-08-06 ENCOUNTER — OFFICE VISIT (OUTPATIENT)
Dept: INTERNAL MEDICINE CLINIC | Age: 75
End: 2025-08-06

## 2025-08-06 ENCOUNTER — OFFICE VISIT (OUTPATIENT)
Dept: ENDOCRINOLOGY | Age: 75
End: 2025-08-06
Payer: MEDICARE

## 2025-08-06 VITALS
DIASTOLIC BLOOD PRESSURE: 62 MMHG | WEIGHT: 230 LBS | BODY MASS INDEX: 32.93 KG/M2 | SYSTOLIC BLOOD PRESSURE: 106 MMHG | OXYGEN SATURATION: 97 % | HEART RATE: 81 BPM | HEIGHT: 70 IN

## 2025-08-06 VITALS
SYSTOLIC BLOOD PRESSURE: 123 MMHG | HEIGHT: 70 IN | RESPIRATION RATE: 14 BRPM | BODY MASS INDEX: 33.36 KG/M2 | OXYGEN SATURATION: 95 % | DIASTOLIC BLOOD PRESSURE: 53 MMHG | WEIGHT: 233 LBS | HEART RATE: 83 BPM | TEMPERATURE: 98 F

## 2025-08-06 DIAGNOSIS — E11.40 POORLY CONTROLLED TYPE 2 DIABETES MELLITUS WITH NEUROPATHY (HCC): Primary | ICD-10-CM

## 2025-08-06 DIAGNOSIS — E11.65 POORLY CONTROLLED TYPE 2 DIABETES MELLITUS WITH NEUROPATHY (HCC): Primary | ICD-10-CM

## 2025-08-06 DIAGNOSIS — E03.9 ACQUIRED HYPOTHYROIDISM: ICD-10-CM

## 2025-08-06 DIAGNOSIS — E29.1 PRIMARY MALE HYPOGONADISM: ICD-10-CM

## 2025-08-06 DIAGNOSIS — E67.3 HYPERVITAMINOSIS D: ICD-10-CM

## 2025-08-06 DIAGNOSIS — E78.2 MIXED HYPERLIPIDEMIA: ICD-10-CM

## 2025-08-06 DIAGNOSIS — E66.811 CLASS 1 OBESITY WITH SERIOUS COMORBIDITY AND BODY MASS INDEX (BMI) OF 34.0 TO 34.9 IN ADULT, UNSPECIFIED OBESITY TYPE: ICD-10-CM

## 2025-08-06 DIAGNOSIS — I10 ESSENTIAL HYPERTENSION: Primary | ICD-10-CM

## 2025-08-06 DIAGNOSIS — E29.1 TESTOSTERONE DEFICIENCY IN MALE: ICD-10-CM

## 2025-08-06 DIAGNOSIS — E55.9 VITAMIN D DEFICIENCY: ICD-10-CM

## 2025-08-06 DIAGNOSIS — E11.42 DIABETIC POLYNEUROPATHY ASSOCIATED WITH TYPE 2 DIABETES MELLITUS (HCC): ICD-10-CM

## 2025-08-06 DIAGNOSIS — I10 ESSENTIAL HYPERTENSION: ICD-10-CM

## 2025-08-06 DIAGNOSIS — R79.89 ELEVATED SERUM CREATININE: ICD-10-CM

## 2025-08-06 DIAGNOSIS — G47.30 SLEEP APNEA, UNSPECIFIED TYPE: ICD-10-CM

## 2025-08-06 DIAGNOSIS — M1A.9XX0 CHRONIC GOUT WITHOUT TOPHUS, UNSPECIFIED CAUSE, UNSPECIFIED SITE: ICD-10-CM

## 2025-08-06 PROCEDURE — 3078F DIAST BP <80 MM HG: CPT | Performed by: INTERNAL MEDICINE

## 2025-08-06 PROCEDURE — 1159F MED LIST DOCD IN RCRD: CPT | Performed by: INTERNAL MEDICINE

## 2025-08-06 PROCEDURE — 1123F ACP DISCUSS/DSCN MKR DOCD: CPT | Performed by: INTERNAL MEDICINE

## 2025-08-06 PROCEDURE — 3074F SYST BP LT 130 MM HG: CPT | Performed by: INTERNAL MEDICINE

## 2025-08-06 PROCEDURE — 3052F HG A1C>EQUAL 8.0%<EQUAL 9.0%: CPT | Performed by: INTERNAL MEDICINE

## 2025-08-06 PROCEDURE — 95251 CONT GLUC MNTR ANALYSIS I&R: CPT | Performed by: INTERNAL MEDICINE

## 2025-08-06 PROCEDURE — 1160F RVW MEDS BY RX/DR IN RCRD: CPT | Performed by: INTERNAL MEDICINE

## 2025-08-06 PROCEDURE — 99214 OFFICE O/P EST MOD 30 MIN: CPT | Performed by: INTERNAL MEDICINE

## 2025-08-06 RX ORDER — CHOLECALCIFEROL (VITAMIN D3) 50 MCG
1 TABLET ORAL DAILY
Qty: 30 TABLET | Refills: 5 | Status: SHIPPED | OUTPATIENT
Start: 2025-11-06

## 2025-08-06 ASSESSMENT — ENCOUNTER SYMPTOMS
ABDOMINAL PAIN: 0
SHORTNESS OF BREATH: 0
VOMITING: 0
WHEEZING: 0
NAUSEA: 0

## 2025-08-16 DIAGNOSIS — E11.65 POORLY CONTROLLED TYPE 2 DIABETES MELLITUS WITH NEUROPATHY (HCC): ICD-10-CM

## 2025-08-16 DIAGNOSIS — E11.40 POORLY CONTROLLED TYPE 2 DIABETES MELLITUS WITH NEUROPATHY (HCC): ICD-10-CM

## 2025-08-18 RX ORDER — INSULIN GLARGINE 100 [IU]/ML
INJECTION, SOLUTION SUBCUTANEOUS
Qty: 30 ML | Refills: 2 | Status: SHIPPED | OUTPATIENT
Start: 2025-08-18

## 2025-08-26 RX ORDER — PEN NEEDLE, DIABETIC 31 GX5/16"
NEEDLE, DISPOSABLE MISCELLANEOUS
Qty: 200 EACH | Refills: 6 | Status: SHIPPED | OUTPATIENT
Start: 2025-08-26

## 2025-08-28 RX ORDER — SEMAGLUTIDE 2.68 MG/ML
INJECTION, SOLUTION SUBCUTANEOUS
Qty: 9 ML | Refills: 1 | Status: SHIPPED | OUTPATIENT
Start: 2025-08-28